# Patient Record
Sex: FEMALE | Race: OTHER | Employment: UNEMPLOYED | URBAN - METROPOLITAN AREA
[De-identification: names, ages, dates, MRNs, and addresses within clinical notes are randomized per-mention and may not be internally consistent; named-entity substitution may affect disease eponyms.]

---

## 2017-01-26 ENCOUNTER — HOSPITAL ENCOUNTER (EMERGENCY)
Age: 42
Discharge: HOME | End: 2017-01-27
Payer: COMMERCIAL

## 2017-01-26 DIAGNOSIS — R10.30 LOWER ABDOMINAL PAIN: Primary | ICD-10-CM

## 2017-01-26 DIAGNOSIS — N30.00 ACUTE CYSTITIS WITHOUT HEMATURIA: ICD-10-CM

## 2017-01-26 LAB
CHP ED QC CHECK: NORMAL
PREGNANCY TEST URINE, POC: NEGATIVE

## 2017-01-26 PROCEDURE — 81001 URINALYSIS AUTO W/SCOPE: CPT

## 2017-01-26 PROCEDURE — 99284 EMERGENCY DEPT VISIT MOD MDM: CPT

## 2017-01-26 ASSESSMENT — PAIN DESCRIPTION - ORIENTATION: ORIENTATION: LOWER

## 2017-01-26 ASSESSMENT — PAIN DESCRIPTION - DESCRIPTORS: DESCRIPTORS: CRAMPING;SHARP

## 2017-01-26 ASSESSMENT — PAIN DESCRIPTION - DIRECTION: RADIATING_TOWARDS: BILATERAL FLANKS

## 2017-01-26 ASSESSMENT — PAIN DESCRIPTION - LOCATION: LOCATION: ABDOMEN

## 2017-01-26 ASSESSMENT — PAIN SCALES - GENERAL: PAINLEVEL_OUTOF10: 8

## 2017-01-27 VITALS
DIASTOLIC BLOOD PRESSURE: 67 MMHG | OXYGEN SATURATION: 99 % | SYSTOLIC BLOOD PRESSURE: 105 MMHG | HEIGHT: 64 IN | TEMPERATURE: 98.3 F | HEART RATE: 98 BPM | WEIGHT: 137 LBS | RESPIRATION RATE: 16 BRPM | BODY MASS INDEX: 23.39 KG/M2

## 2017-01-27 LAB
ALBUMIN SERPL-MCNC: 4.4 G/DL (ref 3.9–4.9)
ALP BLD-CCNC: 52 U/L (ref 40–130)
ALT SERPL-CCNC: 38 U/L (ref 0–33)
ANION GAP SERPL CALCULATED.3IONS-SCNC: 10 MEQ/L (ref 7–13)
AST SERPL-CCNC: 28 U/L (ref 0–35)
BACTERIA: ABNORMAL /HPF
BASOPHILS ABSOLUTE: 0.1 K/UL (ref 0–0.2)
BASOPHILS RELATIVE PERCENT: 1.3 %
BILIRUB SERPL-MCNC: 0.4 MG/DL (ref 0–1.2)
BILIRUBIN URINE: NEGATIVE
BLOOD, URINE: NEGATIVE
BUN BLDV-MCNC: 10 MG/DL (ref 6–20)
CALCIUM SERPL-MCNC: 9.6 MG/DL (ref 8.6–10.2)
CHLORIDE BLD-SCNC: 100 MEQ/L (ref 98–107)
CLARITY: ABNORMAL
CO2: 27 MEQ/L (ref 22–29)
COLOR: YELLOW
CREAT SERPL-MCNC: 0.54 MG/DL (ref 0.5–0.9)
EOSINOPHILS ABSOLUTE: 0.2 K/UL (ref 0–0.7)
EOSINOPHILS RELATIVE PERCENT: 4.3 %
EPITHELIAL CELLS, UA: ABNORMAL /HPF
GFR AFRICAN AMERICAN: >60
GFR NON-AFRICAN AMERICAN: >60
GLOBULIN: 3.4 G/DL (ref 2.3–3.5)
GLUCOSE BLD-MCNC: 99 MG/DL (ref 74–109)
GLUCOSE URINE: NEGATIVE MG/DL
HCT VFR BLD CALC: 39.5 % (ref 37–47)
HEMOGLOBIN: 13.3 G/DL (ref 12–16)
KETONES, URINE: NEGATIVE MG/DL
LEUKOCYTE ESTERASE, URINE: ABNORMAL
LIPASE: 128 U/L (ref 13–60)
LYMPHOCYTES ABSOLUTE: 2 K/UL (ref 1–4.8)
LYMPHOCYTES RELATIVE PERCENT: 35.5 %
MCH RBC QN AUTO: 28.2 PG (ref 27–31.3)
MCHC RBC AUTO-ENTMCNC: 33.7 % (ref 33–37)
MCV RBC AUTO: 83.7 FL (ref 82–100)
MONOCYTES ABSOLUTE: 0.5 K/UL (ref 0.2–0.8)
MONOCYTES RELATIVE PERCENT: 9.4 %
NEUTROPHILS ABSOLUTE: 2.8 K/UL (ref 1.4–6.5)
NEUTROPHILS RELATIVE PERCENT: 49.5 %
NITRITE, URINE: NEGATIVE
PDW BLD-RTO: 12.9 % (ref 11.5–14.5)
PH UA: 6.5 (ref 5–9)
PLATELET # BLD: 169 K/UL (ref 130–400)
POTASSIUM SERPL-SCNC: 3.7 MEQ/L (ref 3.5–5.1)
PROTEIN UA: NEGATIVE MG/DL
RBC # BLD: 4.72 M/UL (ref 4.2–5.4)
RBC UA: ABNORMAL /HPF (ref 0–2)
SODIUM BLD-SCNC: 137 MEQ/L (ref 132–144)
SPECIFIC GRAVITY UA: 1.02 (ref 1–1.03)
TOTAL PROTEIN: 7.8 G/DL (ref 6.4–8.1)
URINE REFLEX TO CULTURE: YES
UROBILINOGEN, URINE: 0.2 E.U./DL
WBC # BLD: 5.6 K/UL (ref 4.8–10.8)
WBC UA: ABNORMAL /HPF (ref 0–5)

## 2017-01-27 PROCEDURE — 80053 COMPREHEN METABOLIC PANEL: CPT

## 2017-01-27 PROCEDURE — 83690 ASSAY OF LIPASE: CPT

## 2017-01-27 PROCEDURE — 6370000000 HC RX 637 (ALT 250 FOR IP): Performed by: PHYSICIAN ASSISTANT

## 2017-01-27 PROCEDURE — 85025 COMPLETE CBC W/AUTO DIFF WBC: CPT

## 2017-01-27 PROCEDURE — 36415 COLL VENOUS BLD VENIPUNCTURE: CPT

## 2017-01-27 PROCEDURE — 87086 URINE CULTURE/COLONY COUNT: CPT

## 2017-01-27 RX ORDER — NAPROXEN 500 MG/1
500 TABLET ORAL 2 TIMES DAILY
Qty: 20 TABLET | Refills: 0 | Status: ON HOLD | OUTPATIENT
Start: 2017-01-27 | End: 2019-02-10

## 2017-01-27 RX ORDER — CIPROFLOXACIN 500 MG/1
500 TABLET, FILM COATED ORAL ONCE
Status: COMPLETED | OUTPATIENT
Start: 2017-01-27 | End: 2017-01-27

## 2017-01-27 RX ORDER — HYDROCODONE BITARTRATE AND ACETAMINOPHEN 5; 325 MG/1; MG/1
1 TABLET ORAL ONCE
Status: COMPLETED | OUTPATIENT
Start: 2017-01-27 | End: 2017-01-27

## 2017-01-27 RX ORDER — CIPROFLOXACIN 500 MG/1
500 TABLET, FILM COATED ORAL 2 TIMES DAILY
Qty: 14 TABLET | Refills: 0 | Status: SHIPPED | OUTPATIENT
Start: 2017-01-27 | End: 2017-02-03

## 2017-01-27 RX ADMIN — HYDROCODONE BITARTRATE AND ACETAMINOPHEN 1 TABLET: 5; 325 TABLET ORAL at 00:14

## 2017-01-27 RX ADMIN — CIPROFLOXACIN HYDROCHLORIDE 500 MG: 500 TABLET, FILM COATED ORAL at 01:02

## 2017-01-27 ASSESSMENT — PAIN DESCRIPTION - LOCATION: LOCATION: ABDOMEN

## 2017-01-27 ASSESSMENT — PAIN SCALES - GENERAL
PAINLEVEL_OUTOF10: 8
PAINLEVEL_OUTOF10: 5

## 2017-01-27 ASSESSMENT — PAIN DESCRIPTION - PAIN TYPE: TYPE: ACUTE PAIN

## 2017-01-28 LAB — URINE CULTURE, ROUTINE: NORMAL

## 2017-09-04 ENCOUNTER — APPOINTMENT (OUTPATIENT)
Dept: ULTRASOUND IMAGING | Age: 42
End: 2017-09-04
Payer: COMMERCIAL

## 2017-09-04 ENCOUNTER — HOSPITAL ENCOUNTER (EMERGENCY)
Age: 42
Discharge: HOME | End: 2017-09-04
Attending: EMERGENCY MEDICINE
Payer: COMMERCIAL

## 2017-09-04 VITALS
BODY MASS INDEX: 23.52 KG/M2 | WEIGHT: 137 LBS | HEART RATE: 114 BPM | SYSTOLIC BLOOD PRESSURE: 120 MMHG | TEMPERATURE: 98.7 F | DIASTOLIC BLOOD PRESSURE: 80 MMHG | OXYGEN SATURATION: 100 % | RESPIRATION RATE: 16 BRPM

## 2017-09-04 DIAGNOSIS — N76.0 BACTERIAL VAGINOSIS: Primary | ICD-10-CM

## 2017-09-04 DIAGNOSIS — B96.89 BACTERIAL VAGINOSIS: Primary | ICD-10-CM

## 2017-09-04 LAB
BACTERIA: NORMAL /HPF
BILIRUBIN URINE: NEGATIVE
BLOOD, URINE: NEGATIVE
CHP ED QC CHECK: NORMAL
CLARITY: ABNORMAL
CLUE CELLS: ABNORMAL
COLOR: YELLOW
EPITHELIAL CELLS, UA: NORMAL /HPF
GLUCOSE URINE: NEGATIVE MG/DL
KETONES, URINE: NEGATIVE MG/DL
LEUKOCYTE ESTERASE, URINE: ABNORMAL
MUCUS: PRESENT
NITRITE, URINE: NEGATIVE
PH UA: 5.5 (ref 5–9)
PREGNANCY TEST URINE, POC: NEGATIVE
PROTEIN UA: NEGATIVE MG/DL
RBC UA: NORMAL /HPF (ref 0–2)
SPECIFIC GRAVITY UA: 1.02 (ref 1–1.03)
TRICHOMONAS PREP: ABNORMAL
TRICHOMONAS VAGINALIS SCREEN: NEGATIVE
UROBILINOGEN, URINE: 0.2 E.U./DL
WBC UA: NORMAL /HPF (ref 0–5)
YEAST WET PREP: ABNORMAL

## 2017-09-04 PROCEDURE — 87210 SMEAR WET MOUNT SALINE/INK: CPT

## 2017-09-04 PROCEDURE — 87591 N.GONORRHOEAE DNA AMP PROB: CPT

## 2017-09-04 PROCEDURE — 87660 TRICHOMONAS VAGIN DIR PROBE: CPT

## 2017-09-04 PROCEDURE — 81001 URINALYSIS AUTO W/SCOPE: CPT

## 2017-09-04 PROCEDURE — 87491 CHLMYD TRACH DNA AMP PROBE: CPT

## 2017-09-04 PROCEDURE — 99284 EMERGENCY DEPT VISIT MOD MDM: CPT

## 2017-09-04 PROCEDURE — 76830 TRANSVAGINAL US NON-OB: CPT

## 2017-09-04 RX ORDER — CLINDAMYCIN HYDROCHLORIDE 150 MG/1
300 CAPSULE ORAL 3 TIMES DAILY
Qty: 30 CAPSULE | Refills: 0 | Status: SHIPPED | OUTPATIENT
Start: 2017-09-04 | End: 2017-09-14

## 2017-09-04 ASSESSMENT — PAIN DESCRIPTION - ORIENTATION: ORIENTATION: MID;LOWER

## 2017-09-04 ASSESSMENT — PAIN SCALES - GENERAL: PAINLEVEL_OUTOF10: 8

## 2017-09-04 ASSESSMENT — ENCOUNTER SYMPTOMS
DIARRHEA: 0
COUGH: 0
NAUSEA: 0
SHORTNESS OF BREATH: 0
SORE THROAT: 0
VOMITING: 0
ABDOMINAL PAIN: 0

## 2017-09-04 ASSESSMENT — PAIN DESCRIPTION - LOCATION: LOCATION: ABDOMEN;PELVIS

## 2017-09-04 ASSESSMENT — PAIN DESCRIPTION - FREQUENCY: FREQUENCY: INTERMITTENT

## 2017-09-04 ASSESSMENT — PAIN DESCRIPTION - PAIN TYPE: TYPE: ACUTE PAIN

## 2017-09-04 ASSESSMENT — PAIN DESCRIPTION - PROGRESSION: CLINICAL_PROGRESSION: GRADUALLY WORSENING

## 2017-09-04 ASSESSMENT — PAIN DESCRIPTION - DESCRIPTORS: DESCRIPTORS: STABBING;CRAMPING

## 2017-09-04 ASSESSMENT — PAIN DESCRIPTION - ONSET: ONSET: ON-GOING

## 2017-09-08 LAB
C TRACH DNA GENITAL QL NAA+PROBE: NEGATIVE
N. GONORRHOEAE DNA: NEGATIVE

## 2017-12-05 ENCOUNTER — OFFICE VISIT (OUTPATIENT)
Dept: PEDIATRICS | Age: 42
End: 2017-12-05

## 2017-12-05 VITALS
SYSTOLIC BLOOD PRESSURE: 92 MMHG | TEMPERATURE: 98.5 F | HEART RATE: 91 BPM | BODY MASS INDEX: 22.49 KG/M2 | WEIGHT: 131 LBS | OXYGEN SATURATION: 99 % | DIASTOLIC BLOOD PRESSURE: 62 MMHG | RESPIRATION RATE: 14 BRPM

## 2017-12-05 DIAGNOSIS — N94.6 DYSMENORRHEA: ICD-10-CM

## 2017-12-05 DIAGNOSIS — N30.00 ACUTE CYSTITIS WITHOUT HEMATURIA: Primary | ICD-10-CM

## 2017-12-05 LAB
BILIRUBIN, POC: ABNORMAL
BLOOD URINE, POC: NEGATIVE
CLARITY, POC: CLEAR
COLOR, POC: YELLOW
CONTROL: NORMAL
GLUCOSE URINE, POC: NEGATIVE
KETONES, POC: NEGATIVE
LEUKOCYTE EST, POC: ABNORMAL
NITRITE, POC: NEGATIVE
PH, POC: 5
PREGNANCY TEST URINE, POC: NEGATIVE
PROTEIN, POC: ABNORMAL
SPECIFIC GRAVITY, POC: 1.01
UROBILINOGEN, POC: NEGATIVE

## 2017-12-05 PROCEDURE — 81002 URINALYSIS NONAUTO W/O SCOPE: CPT | Performed by: NURSE PRACTITIONER

## 2017-12-05 PROCEDURE — 81025 URINE PREGNANCY TEST: CPT | Performed by: NURSE PRACTITIONER

## 2017-12-05 PROCEDURE — 99202 OFFICE O/P NEW SF 15 MIN: CPT | Performed by: NURSE PRACTITIONER

## 2017-12-05 RX ORDER — METFORMIN HYDROCHLORIDE 500 MG/1
TABLET, EXTENDED RELEASE ORAL
Refills: 3 | Status: ON HOLD | COMMUNITY
Start: 2017-09-01 | End: 2019-02-10

## 2017-12-05 RX ORDER — NITROFURANTOIN 25; 75 MG/1; MG/1
100 CAPSULE ORAL 2 TIMES DAILY
Qty: 14 CAPSULE | Refills: 0 | Status: SHIPPED | OUTPATIENT
Start: 2017-12-05 | End: 2017-12-12

## 2017-12-05 ASSESSMENT — ENCOUNTER SYMPTOMS
WHEEZING: 0
SHORTNESS OF BREATH: 0
ABDOMINAL PAIN: 0
SORE THROAT: 0
SINUS PAIN: 0
COUGH: 0
NAUSEA: 0
VOMITING: 0

## 2017-12-05 NOTE — PATIENT INSTRUCTIONS
Patient Education        Troy Kathleen en las mujeres: Instrucciones de cuidado - [ Urinary Tract Infection in Women: Care Instructions ]  Instrucciones de cuidado    Doreen infección urinaria (UTI, por helga siglas en inglés) es un término general que hace referencia a doreen infección que se produce en cualquier parte entre los riñones y la uretra (conducto por el cual se expulsa la orina). La mayoría de las UTI son infecciones de la vejiga. Con frecuencia, causan dolor o ardor al [place]. Las UTI son causadas por bacterias y pueden curarse con antibióticos. Asegúrese de completar el tratamiento para que la infección desaparezca. La atención de seguimiento es doreen parte clave de lainez tratamiento y seguridad. Asegúrese de hacer y acudir a todas las citas, y llame a lainez médico si está teniendo problemas. También es doreen buena idea saber los resultados de helga exámenes y mantener doreen lista de los medicamentos que aayush. ¿Cómo puede cuidarse en el hogar? · [address] No deje de tomarlos por el hecho de sentirse mejor. Debe ok todos los antibióticos hasta terminarlos. · Sarah los próximos nimo o [address], yasmeen mayor cantidad de [place] y otros líquidos. [place] puede ayudar a eliminar las bacterias que provocan la infección. (Si tiene doreen enfermedad de los riñones, el corazón o el hígado y tiene que Betty's líquidos, hable con lainez médico antes de aumentar lainez consumo). · Evite las bebidas gaseosas o con cafeína. Pueden irritar la vejiga. · Orine con frecuencia. Trate de vaciar la vejiga cada vez que orine. · Para aliviar el dolor, tome un baño caliente o colóquese doreen almohadilla térmica a baja temperatura sobre la parte baja del abdomen o la luis alberto genital. Nunca se duerma mientras [place] doreen almohadilla térmica. Para prevenir las infecciones urinarias  · Yasmeen abundante agua todos los días. [place] la ayuda a orinar con frecuencia, lo que elimina las bacterias de lainez organismo.  (Si tiene [employer]

## 2017-12-05 NOTE — PROGRESS NOTES
 oxyCODONE-acetaminophen (PERCOCET) 5-325 MG per tablet 1 or 2 every 4-6 hours prn pain 30 tablet 0    ibuprofen (ADVIL;MOTRIN) 600 MG tablet Take 1 tablet by mouth every 6 hours as needed for Pain 30 tablet 3     No current facility-administered medications on file prior to visit. Allergies   Allergen Reactions    Metronidazole Swelling    Sulfa Antibiotics Swelling     Patient Active Problem List   Diagnosis    Obstructed fallopian tubes     Review of Systems   Constitutional: Negative for chills. HENT: Negative for congestion, ear pain, postnasal drip, sinus pain and sore throat. Respiratory: Negative for cough, shortness of breath and wheezing. Cardiovascular: Negative for chest pain. Gastrointestinal: Negative for abdominal pain, nausea and vomiting. Genitourinary: Positive for dysuria. Negative for flank pain, frequency, hematuria, hesitancy and urgency. Objective:     Vitals:    12/05/17 0856   BP: 92/62   Site: Left Arm   Position: Sitting   Cuff Size: Medium Adult   Pulse: 91   Resp: 14   Temp: 98.5 °F (36.9 °C)   TempSrc: Oral   SpO2: 99%   Weight: 131 lb (59.4 kg)     Physical Exam   Constitutional: She appears well-developed and well-nourished. HENT:   Head: Normocephalic. Cardiovascular: Normal rate, regular rhythm and normal heart sounds. Pulmonary/Chest: Effort normal and breath sounds normal.   Abdominal: Normal appearance. There is no hepatosplenomegaly. There is tenderness in the suprapubic area. There is no rigidity, no rebound, no guarding, no CVA tenderness, no tenderness at McBurney's point and negative Tesfaye's sign. Home School: Children attend school in [facility]    Who is present for visit: Daughter    Disposition: Discharged home    Assessment & Plan:     Linette June was seen today for dysuria.     Diagnoses and all orders for this visit:    Acute cystitis without hematuria  -     POCT Urinalysis no Micro  -     nitrofurantoin, macrocrystal-monohydrate,

## 2018-01-02 ENCOUNTER — HOSPITAL ENCOUNTER (EMERGENCY)
Age: 43
Discharge: HOME | End: 2018-01-02
Payer: COMMERCIAL

## 2018-01-02 ENCOUNTER — APPOINTMENT (OUTPATIENT)
Dept: CT IMAGING | Age: 43
End: 2018-01-02
Payer: COMMERCIAL

## 2018-01-02 ENCOUNTER — APPOINTMENT (OUTPATIENT)
Dept: GENERAL RADIOLOGY | Age: 43
End: 2018-01-02
Payer: COMMERCIAL

## 2018-01-02 VITALS
BODY MASS INDEX: 22.2 KG/M2 | OXYGEN SATURATION: 100 % | DIASTOLIC BLOOD PRESSURE: 67 MMHG | TEMPERATURE: 99.4 F | HEART RATE: 109 BPM | RESPIRATION RATE: 20 BRPM | SYSTOLIC BLOOD PRESSURE: 104 MMHG | WEIGHT: 130 LBS | HEIGHT: 64 IN

## 2018-01-02 DIAGNOSIS — M79.10 MYALGIA: ICD-10-CM

## 2018-01-02 DIAGNOSIS — N39.0 URINARY TRACT INFECTION WITHOUT HEMATURIA, SITE UNSPECIFIED: ICD-10-CM

## 2018-01-02 DIAGNOSIS — B34.9 NONSPECIFIC SYNDROME SUGGESTIVE OF VIRAL ILLNESS: ICD-10-CM

## 2018-01-02 DIAGNOSIS — R11.0 NAUSEA: Primary | ICD-10-CM

## 2018-01-02 LAB
ALBUMIN SERPL-MCNC: 4.3 G/DL (ref 3.9–4.9)
ALP BLD-CCNC: 81 U/L (ref 40–130)
ALT SERPL-CCNC: 155 U/L (ref 0–33)
ANION GAP SERPL CALCULATED.3IONS-SCNC: 10 MEQ/L (ref 7–13)
AST SERPL-CCNC: 119 U/L (ref 0–35)
BACTERIA: ABNORMAL /HPF
BILIRUB SERPL-MCNC: 0.8 MG/DL (ref 0–1.2)
BILIRUBIN URINE: NEGATIVE
BLOOD, URINE: ABNORMAL
BUN BLDV-MCNC: 7 MG/DL (ref 6–20)
CALCIUM SERPL-MCNC: 9.3 MG/DL (ref 8.6–10.2)
CHLORIDE BLD-SCNC: 100 MEQ/L (ref 98–107)
CLARITY: ABNORMAL
CO2: 29 MEQ/L (ref 22–29)
COLOR: YELLOW
CREAT SERPL-MCNC: 0.56 MG/DL (ref 0.5–0.9)
EPITHELIAL CELLS, UA: ABNORMAL /HPF
GFR AFRICAN AMERICAN: >60
GFR NON-AFRICAN AMERICAN: >60
GLOBULIN: 4.4 G/DL (ref 2.3–3.5)
GLUCOSE BLD-MCNC: 90 MG/DL (ref 74–109)
GLUCOSE URINE: NEGATIVE MG/DL
HCT VFR BLD CALC: 40.9 % (ref 37–47)
HEMOGLOBIN: 13.8 G/DL (ref 12–16)
KETONES, URINE: NEGATIVE MG/DL
LEUKOCYTE ESTERASE, URINE: ABNORMAL
LIPASE: 28 U/L (ref 13–60)
MCH RBC QN AUTO: 28.5 PG (ref 27–31.3)
MCHC RBC AUTO-ENTMCNC: 33.6 % (ref 33–37)
MCV RBC AUTO: 84.9 FL (ref 82–100)
NITRITE, URINE: NEGATIVE
PDW BLD-RTO: 12.5 % (ref 11.5–14.5)
PH UA: 8 (ref 5–9)
PLATELET # BLD: 175 K/UL (ref 130–400)
POTASSIUM SERPL-SCNC: 4.3 MEQ/L (ref 3.5–5.1)
PREGNANCY TEST URINE, POC: NEGATIVE
PROTEIN UA: ABNORMAL MG/DL
RAPID INFLUENZA  B AGN: NEGATIVE
RAPID INFLUENZA A AGN: NEGATIVE
RBC # BLD: 4.82 M/UL (ref 4.2–5.4)
RBC UA: ABNORMAL /HPF (ref 0–2)
SODIUM BLD-SCNC: 139 MEQ/L (ref 132–144)
SPECIFIC GRAVITY UA: 1.01 (ref 1–1.03)
TOTAL PROTEIN: 8.7 G/DL (ref 6.4–8.1)
URINE REFLEX TO CULTURE: YES
UROBILINOGEN, URINE: 1 E.U./DL
WBC # BLD: 7.2 K/UL (ref 4.8–10.8)
WBC UA: ABNORMAL /HPF (ref 0–5)

## 2018-01-02 PROCEDURE — 6360000002 HC RX W HCPCS: Performed by: PHYSICIAN ASSISTANT

## 2018-01-02 PROCEDURE — 96374 THER/PROPH/DIAG INJ IV PUSH: CPT

## 2018-01-02 PROCEDURE — 81001 URINALYSIS AUTO W/SCOPE: CPT

## 2018-01-02 PROCEDURE — 87077 CULTURE AEROBIC IDENTIFY: CPT

## 2018-01-02 PROCEDURE — 99284 EMERGENCY DEPT VISIT MOD MDM: CPT

## 2018-01-02 PROCEDURE — 87186 SC STD MICRODIL/AGAR DIL: CPT

## 2018-01-02 PROCEDURE — 80053 COMPREHEN METABOLIC PANEL: CPT

## 2018-01-02 PROCEDURE — 96375 TX/PRO/DX INJ NEW DRUG ADDON: CPT

## 2018-01-02 PROCEDURE — 87086 URINE CULTURE/COLONY COUNT: CPT

## 2018-01-02 PROCEDURE — 71046 X-RAY EXAM CHEST 2 VIEWS: CPT

## 2018-01-02 PROCEDURE — 85027 COMPLETE CBC AUTOMATED: CPT

## 2018-01-02 PROCEDURE — 83690 ASSAY OF LIPASE: CPT

## 2018-01-02 PROCEDURE — 36415 COLL VENOUS BLD VENIPUNCTURE: CPT

## 2018-01-02 PROCEDURE — 2580000003 HC RX 258: Performed by: PHYSICIAN ASSISTANT

## 2018-01-02 PROCEDURE — 74176 CT ABD & PELVIS W/O CONTRAST: CPT

## 2018-01-02 PROCEDURE — 86403 PARTICLE AGGLUT ANTBDY SCRN: CPT

## 2018-01-02 RX ORDER — KETOROLAC TROMETHAMINE 30 MG/ML
30 INJECTION, SOLUTION INTRAMUSCULAR; INTRAVENOUS ONCE
Status: COMPLETED | OUTPATIENT
Start: 2018-01-02 | End: 2018-01-02

## 2018-01-02 RX ORDER — ONDANSETRON 2 MG/ML
4 INJECTION INTRAMUSCULAR; INTRAVENOUS ONCE
Status: COMPLETED | OUTPATIENT
Start: 2018-01-02 | End: 2018-01-02

## 2018-01-02 RX ORDER — TRAMADOL HYDROCHLORIDE 50 MG/1
50 TABLET ORAL EVERY 6 HOURS PRN
Qty: 8 TABLET | Refills: 0 | Status: SHIPPED | OUTPATIENT
Start: 2018-01-02 | End: 2018-01-04

## 2018-01-02 RX ORDER — MORPHINE SULFATE 4 MG/ML
4 INJECTION, SOLUTION INTRAMUSCULAR; INTRAVENOUS ONCE
Status: COMPLETED | OUTPATIENT
Start: 2018-01-02 | End: 2018-01-02

## 2018-01-02 RX ORDER — ONDANSETRON 4 MG/1
4 TABLET, FILM COATED ORAL EVERY 8 HOURS PRN
Qty: 12 TABLET | Refills: 0 | Status: SHIPPED | OUTPATIENT
Start: 2018-01-02 | End: 2018-03-18

## 2018-01-02 RX ORDER — NITROFURANTOIN 25; 75 MG/1; MG/1
100 CAPSULE ORAL 2 TIMES DAILY
Qty: 10 CAPSULE | Refills: 0 | Status: SHIPPED | OUTPATIENT
Start: 2018-01-02 | End: 2018-01-07

## 2018-01-02 RX ORDER — 0.9 % SODIUM CHLORIDE 0.9 %
1000 INTRAVENOUS SOLUTION INTRAVENOUS ONCE
Status: COMPLETED | OUTPATIENT
Start: 2018-01-02 | End: 2018-01-02

## 2018-01-02 RX ADMIN — SODIUM CHLORIDE 1000 ML: 9 INJECTION, SOLUTION INTRAVENOUS at 10:01

## 2018-01-02 RX ADMIN — Medication 4 MG: at 10:58

## 2018-01-02 RX ADMIN — ONDANSETRON 4 MG: 2 INJECTION INTRAMUSCULAR; INTRAVENOUS at 10:01

## 2018-01-02 RX ADMIN — KETOROLAC TROMETHAMINE 30 MG: 30 INJECTION, SOLUTION INTRAMUSCULAR at 10:02

## 2018-01-02 ASSESSMENT — PAIN DESCRIPTION - FREQUENCY: FREQUENCY: CONTINUOUS

## 2018-01-02 ASSESSMENT — ENCOUNTER SYMPTOMS
COUGH: 1
EYE DISCHARGE: 0
WHEEZING: 0
SHORTNESS OF BREATH: 0
BACK PAIN: 0
ABDOMINAL PAIN: 0
NAUSEA: 1
VOMITING: 1
RHINORRHEA: 0
COLOR CHANGE: 0
SORE THROAT: 0
DIARRHEA: 0
CONSTIPATION: 0
STRIDOR: 0
ABDOMINAL DISTENTION: 0

## 2018-01-02 ASSESSMENT — PAIN DESCRIPTION - LOCATION: LOCATION: FLANK;GENERALIZED

## 2018-01-02 ASSESSMENT — PAIN SCALES - GENERAL
PAINLEVEL_OUTOF10: 8
PAINLEVEL_OUTOF10: 9
PAINLEVEL_OUTOF10: 8

## 2018-01-02 ASSESSMENT — PAIN DESCRIPTION - DESCRIPTORS: DESCRIPTORS: ACHING

## 2018-01-02 ASSESSMENT — PAIN DESCRIPTION - ORIENTATION: ORIENTATION: RIGHT

## 2018-01-02 NOTE — ED PROVIDER NOTES
Social History Narrative    None       SCREENINGS             PHYSICAL EXAM    (up to 7 for level 4, 8 or more for level 5)     ED Triage Vitals [01/02/18 0902]   BP Temp Temp Source Pulse Resp SpO2 Height Weight   119/74 98.2 °F (36.8 °C) Oral 137 18 98 % 5' 4\" (1.626 m) 130 lb (59 kg)       Physical Exam   Constitutional: She is oriented to person, place, and time. She appears well-developed and well-nourished. HENT:   Head: Normocephalic. Eyes: Pupils are equal, round, and reactive to light. Neck: Neck supple. No JVD present. No tracheal deviation present. Cardiovascular: Normal rate. Pulmonary/Chest: Effort normal and breath sounds normal. No respiratory distress. She has no wheezes. She has no rales. She exhibits no tenderness. Abdominal: Soft. Bowel sounds are normal. She exhibits no distension and no mass. There is no tenderness. There is no rebound and no guarding. Musculoskeletal: Normal range of motion. She exhibits no edema or deformity. Neurological: She is oriented to person, place, and time. Coordination normal.   Positive right CVA tenderness. Skin: Skin is warm and dry. Psychiatric: She has a normal mood and affect. DIAGNOSTIC RESULTS     EKG: All EKG's are interpreted by the Emergency Department Physician who either signs or Co-signs this chart in the absence of a cardiologist.        RADIOLOGY:   Non-plain film images such as CT, Ultrasound and MRI are read by the radiologist. Plain radiographic images are visualized and preliminarily interpreted by the emergency physician with the below findings:    CT abdomen and pelvis shows medullary calcification associate with horseshoe kidney there is no hydronephrosis    Interpretation per the Radiologist below, if available at the time of this note:    CT ABDOMEN PELVIS WO IV CONTRAST Additional Contrast? None   Final Result   MEDULLARY CALCIFICATION ASSOCIATED WITH HORSESHOE KIDNEY. THERE IS NO HYDRONEPHROSIS.  NATURE OF THE abdomen pelvis was also completed that she complained of some right flank pain there is no signs of obstructive stone at this time, and other acute intra-abdominal or pelvic process. She'll be discharged as urinary tract infection, placed on Macrobid and Zofran for nausea and Ultram for her pain control and advised to follow up with her regular family physician. CRITICAL CARE TIME   Total Critical Care time was 0 minutes, excluding separately reportable procedures. There was a high probability of clinically significant/life threatening deterioration in the patient's condition which required my urgent intervention. CONSULTS:  None    PROCEDURES:  Unless otherwise noted below, none     Procedures    FINAL IMPRESSION      1. Nausea    2. Urinary tract infection without hematuria, site unspecified    3. Nonspecific syndrome suggestive of viral illness    4. Myalgia          DISPOSITION/PLAN   DISPOSITION Decision To Discharge 01/02/2018 11:22:49 AM      PATIENT REFERRED TO:  [address] [place] [address]    In 2 days        DISCHARGE MEDICATIONS:  New Prescriptions    NITROFURANTOIN, MACROCRYSTAL-MONOHYDRATE, (MACROBID) 100 MG CAPSULE    Take 1 capsule by mouth 2 times daily for 5 days    ONDANSETRON (ZOFRAN) 4 MG TABLET    Take 1 tablet by mouth every 8 hours as needed for Nausea    TRAMADOL (ULTRAM) 50 MG TABLET    Take 1 tablet by mouth every 6 hours as needed for Pain for up to 2 days . Take lowest dose possible to manage pain. Attestation: The Prescription Monitoring Report for this patient was reviewed today.  (Gamaliel Fermin PA-C)    (Please note that portions of this note were completed with a voice recognition program.  Efforts were made to edit the dictations but occasionally words are mis-transcribed.)    Gamaliel Fermin PA-C (electronically signed)  Attending Emergency Physician         Gamaliel Fermin PA-C  01/02/18 Luna Martinez PA-C  01/02/18 8723

## 2018-01-02 NOTE — ED TRIAGE NOTES
Pt ill for 6 days with cough, headache, right ear pain. Cough is productive for clear secretions. Pt awake alert oriented x 3 color pink skin warm and dry. Lungs clear. Pt has frequent dry sounding cough.

## 2018-01-04 LAB
ORGANISM: ABNORMAL
URINE CULTURE, ROUTINE: ABNORMAL
URINE CULTURE, ROUTINE: ABNORMAL

## 2018-03-18 ENCOUNTER — APPOINTMENT (OUTPATIENT)
Dept: ULTRASOUND IMAGING | Age: 43
End: 2018-03-18
Payer: COMMERCIAL

## 2018-03-18 ENCOUNTER — HOSPITAL ENCOUNTER (EMERGENCY)
Age: 43
Discharge: HOME | End: 2018-03-18
Payer: COMMERCIAL

## 2018-03-18 VITALS
HEIGHT: 64 IN | BODY MASS INDEX: 22.2 KG/M2 | SYSTOLIC BLOOD PRESSURE: 98 MMHG | DIASTOLIC BLOOD PRESSURE: 56 MMHG | OXYGEN SATURATION: 100 % | WEIGHT: 130 LBS | RESPIRATION RATE: 18 BRPM | HEART RATE: 98 BPM | TEMPERATURE: 98.2 F

## 2018-03-18 DIAGNOSIS — N71.9 ENDOMETRITIS: ICD-10-CM

## 2018-03-18 DIAGNOSIS — D64.9 ANEMIA, UNSPECIFIED TYPE: ICD-10-CM

## 2018-03-18 DIAGNOSIS — N93.8 DYSFUNCTIONAL UTERINE BLEEDING: Primary | ICD-10-CM

## 2018-03-18 LAB
ABO/RH: NORMAL
ALBUMIN SERPL-MCNC: 4.1 G/DL (ref 3.9–4.9)
ALP BLD-CCNC: 55 U/L (ref 40–130)
ALT SERPL-CCNC: 40 U/L (ref 0–33)
ANION GAP SERPL CALCULATED.3IONS-SCNC: 8 MEQ/L (ref 7–13)
ANTIBODY SCREEN: NORMAL
AST SERPL-CCNC: 41 U/L (ref 0–35)
BACTERIA: ABNORMAL /HPF
BASOPHILS ABSOLUTE: 0 K/UL (ref 0–0.2)
BASOPHILS RELATIVE PERCENT: 0.7 %
BILIRUB SERPL-MCNC: 0.3 MG/DL (ref 0–1.2)
BILIRUBIN URINE: NEGATIVE
BLOOD, URINE: ABNORMAL
BUN BLDV-MCNC: 6 MG/DL (ref 6–20)
CALCIUM SERPL-MCNC: 9 MG/DL (ref 8.6–10.2)
CHLORIDE BLD-SCNC: 102 MEQ/L (ref 98–107)
CLARITY: ABNORMAL
CO2: 27 MEQ/L (ref 22–29)
COLOR: ABNORMAL
CREAT SERPL-MCNC: 0.45 MG/DL (ref 0.5–0.9)
EOSINOPHILS ABSOLUTE: 0.3 K/UL (ref 0–0.7)
EOSINOPHILS RELATIVE PERCENT: 7.8 %
EPITHELIAL CELLS, UA: ABNORMAL /HPF
GFR AFRICAN AMERICAN: >60
GFR NON-AFRICAN AMERICAN: >60
GLOBULIN: 3.5 G/DL (ref 2.3–3.5)
GLUCOSE BLD-MCNC: 97 MG/DL (ref 74–109)
GLUCOSE URINE: NEGATIVE MG/DL
HCT VFR BLD CALC: 23.6 % (ref 37–47)
HEMOGLOBIN: 8 G/DL (ref 12–16)
KETONES, URINE: ABNORMAL MG/DL
LEUKOCYTE ESTERASE, URINE: ABNORMAL
LYMPHOCYTES ABSOLUTE: 1.6 K/UL (ref 1–4.8)
LYMPHOCYTES RELATIVE PERCENT: 36.9 %
MCH RBC QN AUTO: 28.7 PG (ref 27–31.3)
MCHC RBC AUTO-ENTMCNC: 34.1 % (ref 33–37)
MCV RBC AUTO: 84 FL (ref 82–100)
MONOCYTES ABSOLUTE: 0.4 K/UL (ref 0.2–0.8)
MONOCYTES RELATIVE PERCENT: 8.6 %
NEUTROPHILS ABSOLUTE: 1.9 K/UL (ref 1.4–6.5)
NEUTROPHILS RELATIVE PERCENT: 46 %
NITRITE, URINE: NEGATIVE
PDW BLD-RTO: 13.7 % (ref 11.5–14.5)
PH UA: 6.5 (ref 5–9)
PLATELET # BLD: 167 K/UL (ref 130–400)
POTASSIUM SERPL-SCNC: 4 MEQ/L (ref 3.5–5.1)
PROTEIN UA: 100 MG/DL
RBC # BLD: 2.81 M/UL (ref 4.2–5.4)
RBC UA: >100 /HPF (ref 0–2)
RENAL EPITHELIAL, UA: ABNORMAL /HPF
SODIUM BLD-SCNC: 137 MEQ/L (ref 132–144)
SPECIFIC GRAVITY UA: 1.01 (ref 1–1.03)
TOTAL PROTEIN: 7.6 G/DL (ref 6.4–8.1)
URINE REFLEX TO CULTURE: YES
UROBILINOGEN, URINE: 1 E.U./DL
WBC # BLD: 4.2 K/UL (ref 4.8–10.8)
WBC UA: ABNORMAL /HPF (ref 0–5)

## 2018-03-18 PROCEDURE — 96374 THER/PROPH/DIAG INJ IV PUSH: CPT

## 2018-03-18 PROCEDURE — 76830 TRANSVAGINAL US NON-OB: CPT

## 2018-03-18 PROCEDURE — 85025 COMPLETE CBC W/AUTO DIFF WBC: CPT

## 2018-03-18 PROCEDURE — 76856 US EXAM PELVIC COMPLETE: CPT

## 2018-03-18 PROCEDURE — 86850 RBC ANTIBODY SCREEN: CPT

## 2018-03-18 PROCEDURE — 6360000002 HC RX W HCPCS: Performed by: PHYSICIAN ASSISTANT

## 2018-03-18 PROCEDURE — 2580000003 HC RX 258: Performed by: PHYSICIAN ASSISTANT

## 2018-03-18 PROCEDURE — 86901 BLOOD TYPING SEROLOGIC RH(D): CPT

## 2018-03-18 PROCEDURE — 86900 BLOOD TYPING SEROLOGIC ABO: CPT

## 2018-03-18 PROCEDURE — 6370000000 HC RX 637 (ALT 250 FOR IP): Performed by: PHYSICIAN ASSISTANT

## 2018-03-18 PROCEDURE — 99284 EMERGENCY DEPT VISIT MOD MDM: CPT

## 2018-03-18 PROCEDURE — 80053 COMPREHEN METABOLIC PANEL: CPT

## 2018-03-18 PROCEDURE — 87086 URINE CULTURE/COLONY COUNT: CPT

## 2018-03-18 PROCEDURE — 81001 URINALYSIS AUTO W/SCOPE: CPT

## 2018-03-18 PROCEDURE — 36415 COLL VENOUS BLD VENIPUNCTURE: CPT

## 2018-03-18 RX ORDER — KETOROLAC TROMETHAMINE 30 MG/ML
30 INJECTION, SOLUTION INTRAMUSCULAR; INTRAVENOUS ONCE
Status: COMPLETED | OUTPATIENT
Start: 2018-03-18 | End: 2018-03-18

## 2018-03-18 RX ORDER — CEPHALEXIN 500 MG/1
500 CAPSULE ORAL 4 TIMES DAILY
Qty: 40 CAPSULE | Refills: 0 | Status: SHIPPED | OUTPATIENT
Start: 2018-03-18 | End: 2018-04-19 | Stop reason: ALTCHOICE

## 2018-03-18 RX ORDER — 0.9 % SODIUM CHLORIDE 0.9 %
1000 INTRAVENOUS SOLUTION INTRAVENOUS ONCE
Status: COMPLETED | OUTPATIENT
Start: 2018-03-18 | End: 2018-03-18

## 2018-03-18 RX ORDER — MEDROXYPROGESTERONE ACETATE 2.5 MG/1
10 TABLET ORAL DAILY
Qty: 20 TABLET | Refills: 0 | Status: ON HOLD | OUTPATIENT
Start: 2018-03-18 | End: 2019-02-10

## 2018-03-18 RX ORDER — CEPHALEXIN 500 MG/1
500 CAPSULE ORAL ONCE
Status: COMPLETED | OUTPATIENT
Start: 2018-03-18 | End: 2018-03-18

## 2018-03-18 RX ORDER — MEDROXYPROGESTERONE ACETATE 10 MG/1
10 TABLET ORAL ONCE
Status: COMPLETED | OUTPATIENT
Start: 2018-03-18 | End: 2018-03-18

## 2018-03-18 RX ORDER — SODIUM CHLORIDE 9 MG/ML
INJECTION, SOLUTION INTRAVENOUS
Status: DISCONTINUED
Start: 2018-03-18 | End: 2018-03-19 | Stop reason: HOSPADM

## 2018-03-18 RX ADMIN — SODIUM CHLORIDE 1000 ML: 9 INJECTION, SOLUTION INTRAVENOUS at 17:42

## 2018-03-18 RX ADMIN — KETOROLAC TROMETHAMINE 30 MG: 30 INJECTION, SOLUTION INTRAMUSCULAR at 18:32

## 2018-03-18 RX ADMIN — MEDROXYPROGESTERONE ACETATE 10 MG: 10 TABLET ORAL at 22:50

## 2018-03-18 RX ADMIN — CEPHALEXIN 500 MG: 500 CAPSULE ORAL at 21:40

## 2018-03-18 ASSESSMENT — PAIN DESCRIPTION - FREQUENCY: FREQUENCY: INTERMITTENT

## 2018-03-18 ASSESSMENT — ENCOUNTER SYMPTOMS
TROUBLE SWALLOWING: 0
COLOR CHANGE: 0
SHORTNESS OF BREATH: 0
EYE PAIN: 0
ABDOMINAL PAIN: 0
APNEA: 0
ALLERGIC/IMMUNOLOGIC NEGATIVE: 1

## 2018-03-18 ASSESSMENT — PAIN DESCRIPTION - ORIENTATION: ORIENTATION: RIGHT;LEFT;LOWER

## 2018-03-18 ASSESSMENT — PAIN DESCRIPTION - PAIN TYPE: TYPE: ACUTE PAIN

## 2018-03-18 ASSESSMENT — PAIN SCALES - GENERAL
PAINLEVEL_OUTOF10: 6
PAINLEVEL_OUTOF10: 2

## 2018-03-18 ASSESSMENT — PAIN DESCRIPTION - DESCRIPTORS: DESCRIPTORS: CRAMPING

## 2018-03-18 ASSESSMENT — PAIN DESCRIPTION - PROGRESSION: CLINICAL_PROGRESSION: GRADUALLY IMPROVING

## 2018-03-18 ASSESSMENT — PAIN DESCRIPTION - ONSET: ONSET: ON-GOING

## 2018-03-18 ASSESSMENT — PAIN DESCRIPTION - LOCATION: LOCATION: ABDOMEN

## 2018-03-18 NOTE — ED PROVIDER NOTES
place, and time. She appears well-developed and well-nourished. No distress. HENT:   Head: Normocephalic and atraumatic. Mouth/Throat: No oropharyngeal exudate. Eyes: Conjunctivae and EOM are normal. Pupils are equal, round, and reactive to light. No scleral icterus. Neck: Normal range of motion. Neck supple. No tracheal deviation present. Cardiovascular: Normal rate, normal heart sounds and intact distal pulses. Pulmonary/Chest: Effort normal and breath sounds normal. No respiratory distress. Abdominal: Soft. Bowel sounds are normal. She exhibits no distension. Musculoskeletal: Normal range of motion. Neurological: She is alert and oriented to person, place, and time. No cranial nerve deficit. She exhibits normal muscle tone. Skin: Skin is warm and dry. No rash noted. She is not diaphoretic. No erythema. Psychiatric: She has a normal mood and affect. Her behavior is normal. Judgment and thought content normal.   Nursing note and vitals reviewed. DIAGNOSTIC RESULTS     RADIOLOGY:   Non-plain film images such as CT, Ultrasound and MRI are read by the radiologist. Plain radiographic images are visualized and preliminarily interpreted by Ulysses Dust, PA-C with the below findings:     Thickened endometrium, cannot r/o carcinoma    Interpretation per the Radiologist below, if available at the time of this note:    US PELVIS COMPLETE    (Results Pending)   US NON OB TRANSVAGINAL    (Results Pending)       LABS:  Labs Reviewed   COMPREHENSIVE METABOLIC PANEL - Abnormal; Notable for the following:        Result Value    CREATININE 0.45 (*)     ALT 40 (*)     AST 41 (*)     All other components within normal limits   CBC WITH AUTO DIFFERENTIAL - Abnormal; Notable for the following:     WBC 4.2 (*)     RBC 2.81 (*)     Hemoglobin 8.0 (*)     Hematocrit 23.6 (*)     All other components within normal limits   URINE RT REFLEX TO CULTURE - Abnormal; Notable for the following:     Color, UA RED (*) Clarity, UA CLOUDY (*)     Ketones, Urine TRACE (*)     Blood, Urine LARGE (*)     Protein,  (*)     Leukocyte Esterase, Urine SMALL (*)     All other components within normal limits   MICROSCOPIC URINALYSIS - Abnormal; Notable for the following:     WBC, UA 20-50 (*)     RBC, UA >100 (*)     All other components within normal limits   URINE CULTURE   TYPE AND SCREEN       All other labs were within normal range or not returned as of this dictation. EMERGENCY DEPARTMENT COURSE and DIFFERENTIAL DIAGNOSIS/MDM:   Vitals:    Vitals:    03/18/18 1651 03/18/18 1834   BP: 121/78 (!) 104/59   Pulse: 124 108   Resp: 18 18   Temp: 98.2 °F (36.8 °C)    TempSrc: Oral    SpO2: 100% 99%   Weight: 130 lb (59 kg)    Height: 5' 4\" (1.626 m)        REASSESSMENT     ED Course        Patient had improvement of symptoms while in the emergency department. Patient has anemia but comments are both 7.5 and there is no immediate need for transfusion. I discussed the ultrasound findings with the patient and her significant other who does translate for the patient and explained that she needs to see a gynecologist as soon as possible, and I advised them to call in the morning to make an appointment for the next 1-2 days. I will give the patient progesterone to slow her bleeding advised her that if any dizziness was to get worse she should return to emergency department for repeat counts. MDM    PROCEDURES:    Procedures      FINAL IMPRESSION      1. Dysfunctional uterine bleeding    2. Anemia, unspecified type    3.  Endometritis          DISPOSITION/PLAN   DISPOSITION Decision To Discharge 03/18/2018 09:25:31 PM      PATIENT REFERRED TO:  Giovana Box, [address] [facility] [address] [facility]  [id number]  [address]  [phone]    Call in 1 day        DISCHARGE MEDICATIONS:  New Prescriptions    CEPHALEXIN (KEFLEX) 500 MG CAPSULE    Take 1 capsule by mouth 4 times daily    MEDROXYPROGESTERONE (PROVERA) 2.5 MG TABLET    Take 4 tablets by mouth daily       (Please note that portions of this note were completed with a voice recognition program.  Efforts were made to edit the dictations but occasionally words are mis-transcribed.)    MICHAELA Fitzgerald PA-C  03/18/18 212

## 2018-03-19 NOTE — ED NOTES
Pt a&ox4, skin w/d/pink, pulses palp, 0 sob, 0 pain, 0 distress, 0 sob, pt resting in bed watching tv, speech clear, 0 problems.      Adriana Lira, GINNY  03/18/18 0708

## 2018-03-19 NOTE — ED NOTES
Pt medicated per orders, elli. Well, 0 problems, will monitor.      Claudine Momin RN  03/18/18 3853

## 2018-03-20 LAB — URINE CULTURE, ROUTINE: NORMAL

## 2018-04-19 ENCOUNTER — OFFICE VISIT (OUTPATIENT)
Dept: PEDIATRICS CLINIC | Age: 43
End: 2018-04-19
Payer: COMMERCIAL

## 2018-04-19 VITALS
RESPIRATION RATE: 16 BRPM | BODY MASS INDEX: 21.8 KG/M2 | TEMPERATURE: 97.6 F | OXYGEN SATURATION: 98 % | HEART RATE: 94 BPM | WEIGHT: 127 LBS | SYSTOLIC BLOOD PRESSURE: 104 MMHG | DIASTOLIC BLOOD PRESSURE: 64 MMHG

## 2018-04-19 DIAGNOSIS — L02.91 ABSCESS: Primary | ICD-10-CM

## 2018-04-19 PROCEDURE — 1036F TOBACCO NON-USER: CPT | Performed by: NURSE PRACTITIONER

## 2018-04-19 PROCEDURE — 99213 OFFICE O/P EST LOW 20 MIN: CPT | Performed by: NURSE PRACTITIONER

## 2018-04-19 PROCEDURE — G8427 DOCREV CUR MEDS BY ELIG CLIN: HCPCS | Performed by: NURSE PRACTITIONER

## 2018-04-19 PROCEDURE — G8420 CALC BMI NORM PARAMETERS: HCPCS | Performed by: NURSE PRACTITIONER

## 2018-04-19 RX ORDER — IBUPROFEN 200 MG
600 TABLET ORAL EVERY 6 HOURS PRN
Qty: 120 TABLET | Refills: 3 | Status: ON HOLD | OUTPATIENT
Start: 2018-04-19 | End: 2019-02-10

## 2018-04-19 RX ORDER — DOXYCYCLINE HYCLATE 100 MG
100 TABLET ORAL 2 TIMES DAILY
Qty: 20 TABLET | Refills: 0 | Status: SHIPPED | OUTPATIENT
Start: 2018-04-19 | End: 2018-04-29

## 2018-04-20 RX ORDER — FERROUS SULFATE 325(65) MG
TABLET ORAL
Refills: 11 | Status: ON HOLD | COMMUNITY
Start: 2018-03-23 | End: 2019-02-10

## 2018-04-20 ASSESSMENT — ENCOUNTER SYMPTOMS
VOMITING: 0
SHORTNESS OF BREATH: 0
SORE THROAT: 0
EYE PAIN: 0
RHINORRHEA: 0
DIARRHEA: 0
COUGH: 0
NAIL CHANGES: 0

## 2018-10-05 ENCOUNTER — APPOINTMENT (OUTPATIENT)
Dept: GENERAL RADIOLOGY | Age: 43
End: 2018-10-05
Payer: COMMERCIAL

## 2018-10-05 ENCOUNTER — HOSPITAL ENCOUNTER (EMERGENCY)
Age: 43
Discharge: HOME | End: 2018-10-05
Payer: COMMERCIAL

## 2018-10-05 VITALS
BODY MASS INDEX: 20.4 KG/M2 | HEIGHT: 64 IN | TEMPERATURE: 97.6 F | DIASTOLIC BLOOD PRESSURE: 77 MMHG | OXYGEN SATURATION: 100 % | WEIGHT: 119.5 LBS | RESPIRATION RATE: 16 BRPM | HEART RATE: 98 BPM | SYSTOLIC BLOOD PRESSURE: 112 MMHG

## 2018-10-05 DIAGNOSIS — L08.9 TOE INFECTION: Primary | ICD-10-CM

## 2018-10-05 PROCEDURE — 99283 EMERGENCY DEPT VISIT LOW MDM: CPT

## 2018-10-05 PROCEDURE — 73630 X-RAY EXAM OF FOOT: CPT

## 2018-10-05 RX ORDER — AMOXICILLIN AND CLAVULANATE POTASSIUM 875; 125 MG/1; MG/1
1 TABLET, FILM COATED ORAL 2 TIMES DAILY
Qty: 20 TABLET | Refills: 0 | Status: SHIPPED | OUTPATIENT
Start: 2018-10-05 | End: 2018-10-15

## 2018-10-05 ASSESSMENT — PAIN DESCRIPTION - ORIENTATION: ORIENTATION: RIGHT

## 2018-10-05 ASSESSMENT — PAIN DESCRIPTION - LOCATION: LOCATION: TOE (COMMENT WHICH ONE)

## 2018-10-05 ASSESSMENT — ENCOUNTER SYMPTOMS
SHORTNESS OF BREATH: 0
ABDOMINAL PAIN: 0
COUGH: 0
BACK PAIN: 0

## 2018-10-05 ASSESSMENT — PAIN SCALES - GENERAL: PAINLEVEL_OUTOF10: 9

## 2018-10-05 NOTE — ED PROVIDER NOTES
Take 1 tablet by mouth 2 times daily for 20 doses       ALLERGIES     Metronidazole and Sulfa antibiotics    FAMILY HISTORY       Family History   Problem Relation Age of Onset    High Blood Pressure Mother     Stroke Mother     Diabetes Mother     No Known Problems Father           SOCIAL HISTORY       Social History     Social History    Marital status: Single     Spouse name: N/A    Number of children: N/A    Years of education: N/A     Social History Main Topics    Smoking status: Never Smoker    Smokeless tobacco: Never Used    Alcohol use Yes      Comment: occasional    Drug use: No    Sexual activity: Not Asked     Other Topics Concern    None     Social History Narrative    None       SCREENINGS             PHYSICAL EXAM    (up to 7 for level 4, 8 or more for level 5)     ED Triage Vitals [10/05/18 0900]   BP Temp Temp Source Pulse Resp SpO2 Height Weight   112/77 97.6 °F (36.4 °C) Oral 98 16 100 % 5' 4\" (1.626 m) 119 lb 8 oz (54.2 kg)       Physical Exam   Constitutional: She is oriented to person, place, and time. She appears well-developed and well-nourished. HENT:   Head: Normocephalic and atraumatic. Right Ear: External ear normal.   Left Ear: External ear normal.   Mouth/Throat: Oropharynx is clear and moist.   Eyes: Pupils are equal, round, and reactive to light. Conjunctivae and EOM are normal.   Neck: Normal range of motion. Neck supple. Cardiovascular: Normal rate and regular rhythm. Pulmonary/Chest: Effort normal and breath sounds normal.   Abdominal: Soft. Bowel sounds are normal. She exhibits no distension. There is no tenderness. Musculoskeletal: Normal range of motion. Right foot: There is tenderness and swelling. There is normal range of motion, no bony tenderness, normal capillary refill, no crepitus, no deformity and no laceration. Feet:    Neurological: She is alert and oriented to person, place, and time. She has normal reflexes.    Skin: Skin is warm

## 2019-02-10 ENCOUNTER — APPOINTMENT (OUTPATIENT)
Dept: ULTRASOUND IMAGING | Age: 44
DRG: 892 | End: 2019-02-10
Payer: COMMERCIAL

## 2019-02-10 ENCOUNTER — HOSPITAL ENCOUNTER (INPATIENT)
Age: 44
LOS: 8 days | Discharge: HOME | DRG: 892 | End: 2019-02-19
Attending: FAMILY MEDICINE | Admitting: INTERNAL MEDICINE
Payer: COMMERCIAL

## 2019-02-10 ENCOUNTER — APPOINTMENT (OUTPATIENT)
Dept: CT IMAGING | Age: 44
DRG: 892 | End: 2019-02-10
Payer: COMMERCIAL

## 2019-02-10 DIAGNOSIS — N39.0 URINARY TRACT INFECTION ASSOCIATED WITH CATHETERIZATION OF URINARY TRACT, UNSPECIFIED INDWELLING URINARY CATHETER TYPE, INITIAL ENCOUNTER (HCC): ICD-10-CM

## 2019-02-10 DIAGNOSIS — D64.9 ANEMIA, UNSPECIFIED TYPE: ICD-10-CM

## 2019-02-10 DIAGNOSIS — T83.511A URINARY TRACT INFECTION ASSOCIATED WITH CATHETERIZATION OF URINARY TRACT, UNSPECIFIED INDWELLING URINARY CATHETER TYPE, INITIAL ENCOUNTER (HCC): ICD-10-CM

## 2019-02-10 DIAGNOSIS — R62.7 ADULT FAILURE TO THRIVE: Primary | ICD-10-CM

## 2019-02-10 PROBLEM — G93.40 ENCEPHALOPATHY: Status: ACTIVE | Noted: 2019-02-10

## 2019-02-10 LAB
ACANTHOCYTES: ABNORMAL
ALBUMIN SERPL-MCNC: 4.2 G/DL (ref 3.5–4.6)
ALP BLD-CCNC: 60 U/L (ref 40–130)
ALT SERPL-CCNC: 36 U/L (ref 0–33)
AMMONIA: 25 UMOL/L (ref 11–51)
AMPHETAMINE SCREEN, URINE: NORMAL
AMYLASE: 99 U/L (ref 22–93)
ANION GAP SERPL CALCULATED.3IONS-SCNC: 11 MEQ/L (ref 9–15)
ANISOCYTOSIS: ABNORMAL
AST SERPL-CCNC: 42 U/L (ref 0–35)
BACTERIA: ABNORMAL /HPF
BARBITURATE SCREEN URINE: NORMAL
BASOPHILS ABSOLUTE: 0 K/UL (ref 0–0.2)
BASOPHILS RELATIVE PERCENT: 1.1 %
BENZODIAZEPINE SCREEN, URINE: NORMAL
BILIRUB SERPL-MCNC: 0.6 MG/DL (ref 0.2–0.7)
BILIRUBIN URINE: NEGATIVE
BLOOD, URINE: ABNORMAL
BUN BLDV-MCNC: 8 MG/DL (ref 6–20)
CALCIUM SERPL-MCNC: 9.5 MG/DL (ref 8.5–9.9)
CANNABINOID SCREEN URINE: NORMAL
CHLORIDE BLD-SCNC: 109 MEQ/L (ref 95–107)
CLARITY: ABNORMAL
CO2: 23 MEQ/L (ref 20–31)
COCAINE METABOLITE SCREEN URINE: NORMAL
COLOR: ABNORMAL
CREAT SERPL-MCNC: 0.52 MG/DL (ref 0.5–0.9)
EOSINOPHILS ABSOLUTE: 0 K/UL (ref 0–0.7)
EOSINOPHILS RELATIVE PERCENT: 1 %
EPITHELIAL CELLS, UA: ABNORMAL /HPF (ref 0–5)
ETHANOL PERCENT: NORMAL G/DL
ETHANOL: <10 MG/DL (ref 0–0.08)
FERRITIN: 12.9 NG/ML (ref 13–150)
FOLATE: >20 NG/ML (ref 7.3–26.1)
GFR AFRICAN AMERICAN: >60
GFR AFRICAN AMERICAN: >60
GFR NON-AFRICAN AMERICAN: >60
GFR NON-AFRICAN AMERICAN: >60
GLOBULIN: 4.3 G/DL (ref 2.3–3.5)
GLUCOSE BLD-MCNC: 93 MG/DL (ref 70–99)
GLUCOSE URINE: NEGATIVE MG/DL
HCG, URINE, POC: NEGATIVE
HCT VFR BLD CALC: 23 % (ref 37–47)
HEMOGLOBIN: 7.6 G/DL (ref 12–16)
HYALINE CASTS: ABNORMAL /HPF (ref 0–5)
IRON SATURATION: 3 % (ref 11–46)
IRON: 14 UG/DL (ref 37–145)
KETONES, URINE: NEGATIVE MG/DL
LEUKOCYTE ESTERASE, URINE: ABNORMAL
LIPASE: 58 U/L (ref 12–95)
LYMPHOCYTES ABSOLUTE: 0.9 K/UL (ref 1–4.8)
LYMPHOCYTES RELATIVE PERCENT: 41 %
Lab: NORMAL
Lab: NORMAL
MCH RBC QN AUTO: 25.1 PG (ref 27–31.3)
MCHC RBC AUTO-ENTMCNC: 33.2 % (ref 33–37)
MCV RBC AUTO: 75.7 FL (ref 82–100)
MONOCYTES ABSOLUTE: 0.2 K/UL (ref 0.2–0.8)
MONOCYTES RELATIVE PERCENT: 7.5 %
NEGATIVE QC PASS/FAIL: NORMAL
NEUTROPHILS ABSOLUTE: 1.2 K/UL (ref 1.4–6.5)
NEUTROPHILS RELATIVE PERCENT: 51 %
NITRITE, URINE: NEGATIVE
OPIATE SCREEN URINE: NORMAL
OVALOCYTES: ABNORMAL
PDW BLD-RTO: 15.6 % (ref 11.5–14.5)
PERFORMED ON: ABNORMAL
PH UA: 7 (ref 5–9)
PHENCYCLIDINE SCREEN URINE: NORMAL
PLATELET # BLD: 177 K/UL (ref 130–400)
PLATELET SLIDE REVIEW: NORMAL
POC CREATININE WHOLE BLOOD: 0.5
POC CREATININE: 0.5 MG/DL (ref 0.6–1.1)
POC SAMPLE TYPE: ABNORMAL
POIKILOCYTES: ABNORMAL
POSITIVE QC PASS/FAIL: NORMAL
POTASSIUM SERPL-SCNC: 3.8 MEQ/L (ref 3.4–4.9)
PROTEIN UA: ABNORMAL MG/DL
RBC # BLD: 3.03 M/UL (ref 4.2–5.4)
RBC UA: ABNORMAL /HPF (ref 0–5)
SODIUM BLD-SCNC: 143 MEQ/L (ref 135–144)
SPECIFIC GRAVITY UA: 1.02 (ref 1–1.03)
T4 FREE: 1.11 NG/DL (ref 0.84–1.68)
TOTAL IRON BINDING CAPACITY: 409 UG/DL (ref 178–450)
TOTAL PROTEIN: 8.5 G/DL (ref 6.3–8)
TSH SERPL DL<=0.05 MIU/L-ACNC: 0.82 UIU/ML (ref 0.44–3.86)
URINE REFLEX TO CULTURE: YES
UROBILINOGEN, URINE: 1 E.U./DL
VITAMIN B-12: 573 PG/ML (ref 232–1245)
VITAMIN D 25-HYDROXY: 39.7 NG/ML (ref 30–100)
WBC # BLD: 2.3 K/UL (ref 4.8–10.8)
WBC UA: ABNORMAL /HPF (ref 0–5)

## 2019-02-10 PROCEDURE — 81001 URINALYSIS AUTO W/SCOPE: CPT

## 2019-02-10 PROCEDURE — G0378 HOSPITAL OBSERVATION PER HR: HCPCS

## 2019-02-10 PROCEDURE — 6360000002 HC RX W HCPCS: Performed by: INTERNAL MEDICINE

## 2019-02-10 PROCEDURE — 84443 ASSAY THYROID STIM HORMONE: CPT

## 2019-02-10 PROCEDURE — 2580000003 HC RX 258: Performed by: INTERNAL MEDICINE

## 2019-02-10 PROCEDURE — 2580000003 HC RX 258: Performed by: PHYSICIAN ASSISTANT

## 2019-02-10 PROCEDURE — 74177 CT ABD & PELVIS W/CONTRAST: CPT

## 2019-02-10 PROCEDURE — 82306 VITAMIN D 25 HYDROXY: CPT

## 2019-02-10 PROCEDURE — 96366 THER/PROPH/DIAG IV INF ADDON: CPT

## 2019-02-10 PROCEDURE — 36415 COLL VENOUS BLD VENIPUNCTURE: CPT

## 2019-02-10 PROCEDURE — 83540 ASSAY OF IRON: CPT

## 2019-02-10 PROCEDURE — 6360000004 HC RX CONTRAST MEDICATION: Performed by: FAMILY MEDICINE

## 2019-02-10 PROCEDURE — 76830 TRANSVAGINAL US NON-OB: CPT

## 2019-02-10 PROCEDURE — 82746 ASSAY OF FOLIC ACID SERUM: CPT

## 2019-02-10 PROCEDURE — 6360000002 HC RX W HCPCS: Performed by: FAMILY MEDICINE

## 2019-02-10 PROCEDURE — 83690 ASSAY OF LIPASE: CPT

## 2019-02-10 PROCEDURE — 82607 VITAMIN B-12: CPT

## 2019-02-10 PROCEDURE — 70450 CT HEAD/BRAIN W/O DYE: CPT

## 2019-02-10 PROCEDURE — 96365 THER/PROPH/DIAG IV INF INIT: CPT

## 2019-02-10 PROCEDURE — 87086 URINE CULTURE/COLONY COUNT: CPT

## 2019-02-10 PROCEDURE — G0480 DRUG TEST DEF 1-7 CLASSES: HCPCS

## 2019-02-10 PROCEDURE — 84439 ASSAY OF FREE THYROXINE: CPT

## 2019-02-10 PROCEDURE — 82728 ASSAY OF FERRITIN: CPT

## 2019-02-10 PROCEDURE — 83550 IRON BINDING TEST: CPT

## 2019-02-10 PROCEDURE — 80307 DRUG TEST PRSMV CHEM ANLYZR: CPT

## 2019-02-10 PROCEDURE — 82140 ASSAY OF AMMONIA: CPT

## 2019-02-10 PROCEDURE — 96367 TX/PROPH/DG ADDL SEQ IV INF: CPT

## 2019-02-10 PROCEDURE — 85025 COMPLETE CBC W/AUTO DIFF WBC: CPT

## 2019-02-10 PROCEDURE — 82150 ASSAY OF AMYLASE: CPT

## 2019-02-10 PROCEDURE — 80053 COMPREHEN METABOLIC PANEL: CPT

## 2019-02-10 PROCEDURE — 76856 US EXAM PELVIC COMPLETE: CPT

## 2019-02-10 PROCEDURE — 99285 EMERGENCY DEPT VISIT HI MDM: CPT

## 2019-02-10 RX ORDER — ONDANSETRON 2 MG/ML
4 INJECTION INTRAMUSCULAR; INTRAVENOUS EVERY 6 HOURS PRN
Status: DISCONTINUED | OUTPATIENT
Start: 2019-02-10 | End: 2019-02-19 | Stop reason: HOSPADM

## 2019-02-10 RX ORDER — CIPROFLOXACIN 2 MG/ML
400 INJECTION, SOLUTION INTRAVENOUS ONCE
Status: DISCONTINUED | OUTPATIENT
Start: 2019-02-10 | End: 2019-02-10

## 2019-02-10 RX ORDER — IBUPROFEN 200 MG
200 TABLET ORAL EVERY 6 HOURS PRN
COMMUNITY
End: 2019-02-22 | Stop reason: ALTCHOICE

## 2019-02-10 RX ORDER — SODIUM CHLORIDE 0.9 % (FLUSH) 0.9 %
10 SYRINGE (ML) INJECTION PRN
Status: DISCONTINUED | OUTPATIENT
Start: 2019-02-10 | End: 2019-02-19 | Stop reason: HOSPADM

## 2019-02-10 RX ORDER — SODIUM CHLORIDE 0.9 % (FLUSH) 0.9 %
10 SYRINGE (ML) INJECTION EVERY 12 HOURS SCHEDULED
Status: DISCONTINUED | OUTPATIENT
Start: 2019-02-10 | End: 2019-02-19 | Stop reason: HOSPADM

## 2019-02-10 RX ADMIN — IOPAMIDOL 100 ML: 612 INJECTION, SOLUTION INTRAVENOUS at 14:16

## 2019-02-10 RX ADMIN — CIPROFLOXACIN 400 MG: 2 INJECTION, SOLUTION INTRAVENOUS at 16:02

## 2019-02-10 RX ADMIN — Medication 10 ML: at 21:05

## 2019-02-10 RX ADMIN — IRON SUCROSE 300 MG: 20 INJECTION, SOLUTION INTRAVENOUS at 18:51

## 2019-02-10 ASSESSMENT — ENCOUNTER SYMPTOMS
ANAL BLEEDING: 0
ABDOMINAL PAIN: 1
DIARRHEA: 0
CONSTIPATION: 0
BLOOD IN STOOL: 0
RESPIRATORY NEGATIVE: 1
RECTAL PAIN: 0
EYES NEGATIVE: 1
NAUSEA: 1
VOMITING: 0
ABDOMINAL DISTENTION: 0

## 2019-02-10 ASSESSMENT — PAIN DESCRIPTION - PAIN TYPE: TYPE: ACUTE PAIN

## 2019-02-10 ASSESSMENT — PAIN DESCRIPTION - LOCATION: LOCATION: ABDOMEN

## 2019-02-10 ASSESSMENT — PAIN SCALES - GENERAL
PAINLEVEL_OUTOF10: 0
PAINLEVEL_OUTOF10: 0
PAINLEVEL_OUTOF10: 4

## 2019-02-10 ASSESSMENT — PAIN DESCRIPTION - DESCRIPTORS: DESCRIPTORS: ACHING

## 2019-02-10 ASSESSMENT — PAIN DESCRIPTION - ORIENTATION: ORIENTATION: LEFT;LOWER

## 2019-02-10 ASSESSMENT — PAIN DESCRIPTION - FREQUENCY: FREQUENCY: INTERMITTENT

## 2019-02-11 ENCOUNTER — APPOINTMENT (OUTPATIENT)
Dept: MRI IMAGING | Age: 44
DRG: 892 | End: 2019-02-11
Payer: COMMERCIAL

## 2019-02-11 ENCOUNTER — APPOINTMENT (OUTPATIENT)
Dept: CT IMAGING | Age: 44
DRG: 892 | End: 2019-02-11
Payer: COMMERCIAL

## 2019-02-11 LAB
ALBUMIN SERPL-MCNC: 3.7 G/DL (ref 3.5–4.6)
ALP BLD-CCNC: 57 U/L (ref 40–130)
ALT SERPL-CCNC: 34 U/L (ref 0–33)
ANION GAP SERPL CALCULATED.3IONS-SCNC: 14 MEQ/L (ref 9–15)
AST SERPL-CCNC: 38 U/L (ref 0–35)
BASOPHILS ABSOLUTE: 0 K/UL (ref 0–0.2)
BASOPHILS RELATIVE PERCENT: 1.1 %
BILIRUB SERPL-MCNC: 0.5 MG/DL (ref 0.2–0.7)
BUN BLDV-MCNC: 6 MG/DL (ref 6–20)
CALCIUM SERPL-MCNC: 9.2 MG/DL (ref 8.5–9.9)
CHLORIDE BLD-SCNC: 105 MEQ/L (ref 95–107)
CO2: 22 MEQ/L (ref 20–31)
CREAT SERPL-MCNC: 0.58 MG/DL (ref 0.5–0.9)
EOSINOPHILS ABSOLUTE: 0.1 K/UL (ref 0–0.7)
EOSINOPHILS RELATIVE PERCENT: 3 %
GFR AFRICAN AMERICAN: >60
GFR NON-AFRICAN AMERICAN: >60
GLOBULIN: 4.1 G/DL (ref 2.3–3.5)
GLUCOSE BLD-MCNC: 102 MG/DL (ref 70–99)
HCT VFR BLD CALC: 20.6 % (ref 37–47)
HEMOGLOBIN: 7 G/DL (ref 12–16)
LYMPHOCYTES ABSOLUTE: 0.6 K/UL (ref 1–4.8)
LYMPHOCYTES RELATIVE PERCENT: 29.4 %
MAGNESIUM: 2.1 MG/DL (ref 1.7–2.4)
MCH RBC QN AUTO: 25.5 PG (ref 27–31.3)
MCHC RBC AUTO-ENTMCNC: 33.9 % (ref 33–37)
MCV RBC AUTO: 75.2 FL (ref 82–100)
MONOCYTES ABSOLUTE: 0.3 K/UL (ref 0.2–0.8)
MONOCYTES RELATIVE PERCENT: 12.6 %
NEUTROPHILS ABSOLUTE: 1.1 K/UL (ref 1.4–6.5)
NEUTROPHILS RELATIVE PERCENT: 53.9 %
PDW BLD-RTO: 15.4 % (ref 11.5–14.5)
PLATELET # BLD: 169 K/UL (ref 130–400)
POTASSIUM REFLEX MAGNESIUM: 3.3 MEQ/L (ref 3.4–4.9)
RBC # BLD: 2.73 M/UL (ref 4.2–5.4)
RETICULOCYTE ABSOLUTE COUNT: 0.07 M/CUMM (ref 0.02–0.11)
RETICULOCYTE COUNT PCT: 2.7 % (ref 0.6–2.2)
SLIDE REVIEW: ABNORMAL
SODIUM BLD-SCNC: 141 MEQ/L (ref 135–144)
TOTAL PROTEIN: 7.8 G/DL (ref 6.3–8)
VITAMIN D 25-HYDROXY: 36.6 NG/ML (ref 30–100)
WBC # BLD: 2.1 K/UL (ref 4.8–10.8)

## 2019-02-11 PROCEDURE — 2580000003 HC RX 258: Performed by: INTERNAL MEDICINE

## 2019-02-11 PROCEDURE — 99254 IP/OBS CNSLTJ NEW/EST MOD 60: CPT | Performed by: INTERNAL MEDICINE

## 2019-02-11 PROCEDURE — 6370000000 HC RX 637 (ALT 250 FOR IP): Performed by: PSYCHIATRY & NEUROLOGY

## 2019-02-11 PROCEDURE — 82306 VITAMIN D 25 HYDROXY: CPT

## 2019-02-11 PROCEDURE — 6360000002 HC RX W HCPCS: Performed by: INTERNAL MEDICINE

## 2019-02-11 PROCEDURE — 70553 MRI BRAIN STEM W/O & W/DYE: CPT

## 2019-02-11 PROCEDURE — 1210000000 HC MED SURG R&B

## 2019-02-11 PROCEDURE — 83735 ASSAY OF MAGNESIUM: CPT

## 2019-02-11 PROCEDURE — 6360000004 HC RX CONTRAST MEDICATION: Performed by: INTERNAL MEDICINE

## 2019-02-11 PROCEDURE — 95816 EEG AWAKE AND DROWSY: CPT

## 2019-02-11 PROCEDURE — 87529 HSV DNA AMP PROBE: CPT

## 2019-02-11 PROCEDURE — 80053 COMPREHEN METABOLIC PANEL: CPT

## 2019-02-11 PROCEDURE — 88112 CYTOPATH CELL ENHANCE TECH: CPT

## 2019-02-11 PROCEDURE — 6370000000 HC RX 637 (ALT 250 FOR IP): Performed by: INTERNAL MEDICINE

## 2019-02-11 PROCEDURE — 86592 SYPHILIS TEST NON-TREP QUAL: CPT

## 2019-02-11 PROCEDURE — 2580000003 HC RX 258: Performed by: PHYSICIAN ASSISTANT

## 2019-02-11 PROCEDURE — 85025 COMPLETE CBC W/AUTO DIFF WBC: CPT

## 2019-02-11 PROCEDURE — 85046 RETICYTE/HGB CONCENTRATE: CPT

## 2019-02-11 PROCEDURE — 86480 TB TEST CELL IMMUN MEASURE: CPT

## 2019-02-11 PROCEDURE — 99253 IP/OBS CNSLTJ NEW/EST LOW 45: CPT | Performed by: PSYCHIATRY & NEUROLOGY

## 2019-02-11 PROCEDURE — A9579 GAD-BASE MR CONTRAST NOS,1ML: HCPCS | Performed by: INTERNAL MEDICINE

## 2019-02-11 PROCEDURE — 36415 COLL VENOUS BLD VENIPUNCTURE: CPT

## 2019-02-11 PROCEDURE — 74178 CT ABD&PLV WO CNTR FLWD CNTR: CPT

## 2019-02-11 PROCEDURE — 87389 HIV-1 AG W/HIV-1&-2 AB AG IA: CPT

## 2019-02-11 RX ORDER — SODIUM CHLORIDE 0.9 % (FLUSH) 0.9 %
10 SYRINGE (ML) INJECTION PRN
Status: DISCONTINUED | OUTPATIENT
Start: 2019-02-11 | End: 2019-02-16

## 2019-02-11 RX ORDER — POTASSIUM CHLORIDE 20 MEQ/1
60 TABLET, EXTENDED RELEASE ORAL ONCE
Status: COMPLETED | OUTPATIENT
Start: 2019-02-11 | End: 2019-02-11

## 2019-02-11 RX ORDER — SODIUM CHLORIDE 0.9 % (FLUSH) 0.9 %
10 SYRINGE (ML) INJECTION EVERY 12 HOURS SCHEDULED
Status: DISCONTINUED | OUTPATIENT
Start: 2019-02-11 | End: 2019-02-16

## 2019-02-11 RX ORDER — LIDOCAINE HYDROCHLORIDE 20 MG/ML
5 INJECTION, SOLUTION INFILTRATION; PERINEURAL
Status: DISCONTINUED | OUTPATIENT
Start: 2019-02-11 | End: 2019-02-19 | Stop reason: HOSPADM

## 2019-02-11 RX ORDER — CITALOPRAM 10 MG/1
10 TABLET ORAL DAILY
Status: DISCONTINUED | OUTPATIENT
Start: 2019-02-11 | End: 2019-02-19 | Stop reason: HOSPADM

## 2019-02-11 RX ADMIN — CITALOPRAM HYDROBROMIDE 10 MG: 10 TABLET ORAL at 17:24

## 2019-02-11 RX ADMIN — IOPAMIDOL 100 ML: 612 INJECTION, SOLUTION INTRAVENOUS at 08:02

## 2019-02-11 RX ADMIN — GADOTERIDOL 10 ML: 279.3 INJECTION, SOLUTION INTRAVENOUS at 09:20

## 2019-02-11 RX ADMIN — Medication 10 ML: at 11:35

## 2019-02-11 RX ADMIN — ACYCLOVIR SODIUM 270 MG: 50 INJECTION, SOLUTION INTRAVENOUS at 21:15

## 2019-02-11 RX ADMIN — POTASSIUM CHLORIDE 60 MEQ: 20 TABLET, EXTENDED RELEASE ORAL at 11:35

## 2019-02-11 RX ADMIN — Medication 10 ML: at 21:17

## 2019-02-11 ASSESSMENT — PAIN SCALES - GENERAL: PAINLEVEL_OUTOF10: 0

## 2019-02-12 ENCOUNTER — APPOINTMENT (OUTPATIENT)
Dept: MRI IMAGING | Age: 44
DRG: 892 | End: 2019-02-12
Payer: COMMERCIAL

## 2019-02-12 LAB
ANION GAP SERPL CALCULATED.3IONS-SCNC: 12 MEQ/L (ref 9–15)
BASOPHILS ABSOLUTE: 0 K/UL (ref 0–0.2)
BASOPHILS RELATIVE PERCENT: 1.7 %
BUN BLDV-MCNC: 6 MG/DL (ref 6–20)
C-REACTIVE PROTEIN, HIGH SENSITIVITY: 0.6 MG/L (ref 0–5)
CALCIUM SERPL-MCNC: 8.9 MG/DL (ref 8.5–9.9)
CHLORIDE BLD-SCNC: 106 MEQ/L (ref 95–107)
CO2: 20 MEQ/L (ref 20–31)
CREAT SERPL-MCNC: 0.49 MG/DL (ref 0.5–0.9)
EOSINOPHILS ABSOLUTE: 0.1 K/UL (ref 0–0.7)
EOSINOPHILS RELATIVE PERCENT: 3.5 %
GFR AFRICAN AMERICAN: >60
GFR NON-AFRICAN AMERICAN: >60
GLUCOSE BLD-MCNC: 114 MG/DL (ref 60–115)
GLUCOSE BLD-MCNC: 89 MG/DL (ref 70–99)
HCT VFR BLD CALC: 21.6 % (ref 37–47)
HEMOGLOBIN: 7.1 G/DL (ref 12–16)
LYMPHOCYTES ABSOLUTE: 0.6 K/UL (ref 1–4.8)
LYMPHOCYTES RELATIVE PERCENT: 26.5 %
MCH RBC QN AUTO: 24.6 PG (ref 27–31.3)
MCHC RBC AUTO-ENTMCNC: 32.7 % (ref 33–37)
MCV RBC AUTO: 75.4 FL (ref 82–100)
MONOCYTES ABSOLUTE: 0.3 K/UL (ref 0.2–0.8)
MONOCYTES RELATIVE PERCENT: 12.5 %
NEUTROPHILS ABSOLUTE: 1.2 K/UL (ref 1.4–6.5)
NEUTROPHILS RELATIVE PERCENT: 55.8 %
PDW BLD-RTO: 15.7 % (ref 11.5–14.5)
PERFORMED ON: NORMAL
PLATELET # BLD: 175 K/UL (ref 130–400)
POTASSIUM REFLEX MAGNESIUM: 3.9 MEQ/L (ref 3.4–4.9)
RBC # BLD: 2.87 M/UL (ref 4.2–5.4)
SEDIMENTATION RATE, ERYTHROCYTE: 96 MM (ref 0–20)
SODIUM BLD-SCNC: 138 MEQ/L (ref 135–144)
URINE CULTURE, ROUTINE: NORMAL
WBC # BLD: 2.1 K/UL (ref 4.8–10.8)

## 2019-02-12 PROCEDURE — 6360000002 HC RX W HCPCS: Performed by: INTERNAL MEDICINE

## 2019-02-12 PROCEDURE — 2580000003 HC RX 258: Performed by: INTERNAL MEDICINE

## 2019-02-12 PROCEDURE — 99231 SBSQ HOSP IP/OBS SF/LOW 25: CPT | Performed by: INTERNAL MEDICINE

## 2019-02-12 PROCEDURE — 1210000000 HC MED SURG R&B

## 2019-02-12 PROCEDURE — 86225 DNA ANTIBODY NATIVE: CPT

## 2019-02-12 PROCEDURE — 80048 BASIC METABOLIC PNL TOTAL CA: CPT

## 2019-02-12 PROCEDURE — 86141 C-REACTIVE PROTEIN HS: CPT

## 2019-02-12 PROCEDURE — 85652 RBC SED RATE AUTOMATED: CPT

## 2019-02-12 PROCEDURE — 36415 COLL VENOUS BLD VENIPUNCTURE: CPT

## 2019-02-12 PROCEDURE — 70544 MR ANGIOGRAPHY HEAD W/O DYE: CPT

## 2019-02-12 PROCEDURE — 85025 COMPLETE CBC W/AUTO DIFF WBC: CPT

## 2019-02-12 PROCEDURE — 6370000000 HC RX 637 (ALT 250 FOR IP): Performed by: PSYCHIATRY & NEUROLOGY

## 2019-02-12 RX ADMIN — Medication 10 ML: at 10:10

## 2019-02-12 RX ADMIN — CITALOPRAM HYDROBROMIDE 10 MG: 10 TABLET ORAL at 12:34

## 2019-02-12 RX ADMIN — ACYCLOVIR SODIUM 270 MG: 50 INJECTION, SOLUTION INTRAVENOUS at 21:04

## 2019-02-12 RX ADMIN — ACYCLOVIR SODIUM 270 MG: 50 INJECTION, SOLUTION INTRAVENOUS at 05:23

## 2019-02-12 RX ADMIN — Medication 10 ML: at 21:05

## 2019-02-12 ASSESSMENT — PAIN SCALES - GENERAL
PAINLEVEL_OUTOF10: 0
PAINLEVEL_OUTOF10: 0

## 2019-02-13 ENCOUNTER — APPOINTMENT (OUTPATIENT)
Dept: INTERVENTIONAL RADIOLOGY/VASCULAR | Age: 44
DRG: 892 | End: 2019-02-13
Payer: COMMERCIAL

## 2019-02-13 LAB
ACANTHOCYTES: ABNORMAL
ANION GAP SERPL CALCULATED.3IONS-SCNC: 12 MEQ/L (ref 9–15)
ANISOCYTOSIS: ABNORMAL
APPEARANCE CSF: CLEAR
APPEARANCE CSF: CLEAR
BASOPHILS ABSOLUTE: 0 K/UL (ref 0–0.2)
BASOPHILS RELATIVE PERCENT: 0 %
BUN BLDV-MCNC: 7 MG/DL (ref 6–20)
CALCIUM SERPL-MCNC: 9.1 MG/DL (ref 8.5–9.9)
CHLORIDE BLD-SCNC: 104 MEQ/L (ref 95–107)
CLOT EVALUATION CSF: NORMAL
CLOT EVALUATION CSF: NORMAL
CO2: 22 MEQ/L (ref 20–31)
COLOR CSF: COLORLESS
COLOR CSF: COLORLESS
CREAT SERPL-MCNC: 0.52 MG/DL (ref 0.5–0.9)
EOSINOPHILS ABSOLUTE: 0.1 K/UL (ref 0–0.7)
EOSINOPHILS RELATIVE PERCENT: 3 %
GFR AFRICAN AMERICAN: >60
GFR NON-AFRICAN AMERICAN: >60
GLUCOSE BLD-MCNC: 90 MG/DL (ref 70–99)
GLUCOSE, CSF: 52 MG/DL (ref 50–70)
HCT VFR BLD CALC: 23.4 % (ref 37–47)
HEMOGLOBIN: 7.5 G/DL (ref 12–16)
LYMPHOCYTES ABSOLUTE: 1 K/UL (ref 1–4.8)
LYMPHOCYTES RELATIVE PERCENT: 33 %
MCH RBC QN AUTO: 24.4 PG (ref 27–31.3)
MCHC RBC AUTO-ENTMCNC: 32.3 % (ref 33–37)
MCV RBC AUTO: 75.6 FL (ref 82–100)
MONOCYTES ABSOLUTE: 0.3 K/UL (ref 0.2–0.8)
MONOCYTES RELATIVE PERCENT: 9 %
NEUTROPHILS ABSOLUTE: 1.6 K/UL (ref 1.4–6.5)
NEUTROPHILS RELATIVE PERCENT: 55 %
NO DIFFERENTIAL CSF: NORMAL
NO DIFFERENTIAL CSF: NORMAL
OVALOCYTES: ABNORMAL
PDW BLD-RTO: 16.1 % (ref 11.5–14.5)
PLATELET # BLD: 188 K/UL (ref 130–400)
PLATELET SLIDE REVIEW: ADEQUATE
POIKILOCYTES: ABNORMAL
POLYCHROMASIA: ABNORMAL
POTASSIUM REFLEX MAGNESIUM: 4.1 MEQ/L (ref 3.4–4.9)
PROTEIN CSF: 56 MG/DL (ref 15–45)
RBC # BLD: 3.09 M/UL (ref 4.2–5.4)
RBC CSF: 215 K/UL
RBC CSF: 739 K/UL
RPR: NORMAL
SLIDE REVIEW: ABNORMAL
SODIUM BLD-SCNC: 138 MEQ/L (ref 135–144)
STOMATOCYTES: ABNORMAL
TUBE NUMBER CSF: NORMAL
TUBE NUMBER CSF: NORMAL
VOLUME CSF: 10 ML
VOLUME CSF: 10 ML
WBC # BLD: 2.9 K/UL (ref 4.8–10.8)
WBC CSF: 0 K/UL (ref 0–8)
WBC CSF: 2 K/UL (ref 0–8)

## 2019-02-13 PROCEDURE — 83916 OLIGOCLONAL BANDS: CPT

## 2019-02-13 PROCEDURE — 6360000002 HC RX W HCPCS: Performed by: INTERNAL MEDICINE

## 2019-02-13 PROCEDURE — 82040 ASSAY OF SERUM ALBUMIN: CPT

## 2019-02-13 PROCEDURE — 82945 GLUCOSE OTHER FLUID: CPT

## 2019-02-13 PROCEDURE — 2580000003 HC RX 258: Performed by: INTERNAL MEDICINE

## 2019-02-13 PROCEDURE — 86789 WEST NILE VIRUS ANTIBODY: CPT

## 2019-02-13 PROCEDURE — 99232 SBSQ HOSP IP/OBS MODERATE 35: CPT | Performed by: INTERNAL MEDICINE

## 2019-02-13 PROCEDURE — 89050 BODY FLUID CELL COUNT: CPT

## 2019-02-13 PROCEDURE — 85025 COMPLETE CBC W/AUTO DIFF WBC: CPT

## 2019-02-13 PROCEDURE — 1210000000 HC MED SURG R&B

## 2019-02-13 PROCEDURE — 77003 FLUOROGUIDE FOR SPINE INJECT: CPT | Performed by: RADIOLOGY

## 2019-02-13 PROCEDURE — 87205 SMEAR GRAM STAIN: CPT

## 2019-02-13 PROCEDURE — 87102 FUNGUS ISOLATION CULTURE: CPT

## 2019-02-13 PROCEDURE — 99253 IP/OBS CNSLTJ NEW/EST LOW 45: CPT | Performed by: RADIOLOGY

## 2019-02-13 PROCEDURE — 62270 DX LMBR SPI PNXR: CPT | Performed by: RADIOLOGY

## 2019-02-13 PROCEDURE — 86788 WEST NILE VIRUS AB IGM: CPT

## 2019-02-13 PROCEDURE — 82784 ASSAY IGA/IGD/IGG/IGM EACH: CPT

## 2019-02-13 PROCEDURE — 6370000000 HC RX 637 (ALT 250 FOR IP): Performed by: PSYCHIATRY & NEUROLOGY

## 2019-02-13 PROCEDURE — 86592 SYPHILIS TEST NON-TREP QUAL: CPT

## 2019-02-13 PROCEDURE — 80048 BASIC METABOLIC PNL TOTAL CA: CPT

## 2019-02-13 PROCEDURE — 84157 ASSAY OF PROTEIN OTHER: CPT

## 2019-02-13 PROCEDURE — 36415 COLL VENOUS BLD VENIPUNCTURE: CPT

## 2019-02-13 PROCEDURE — 87899 AGENT NOS ASSAY W/OPTIC: CPT

## 2019-02-13 PROCEDURE — 86617 LYME DISEASE ANTIBODY: CPT

## 2019-02-13 PROCEDURE — 83873 ASSAY OF CSF PROTEIN: CPT

## 2019-02-13 PROCEDURE — 87116 MYCOBACTERIA CULTURE: CPT

## 2019-02-13 PROCEDURE — 87070 CULTURE OTHR SPECIMN AEROBIC: CPT

## 2019-02-13 PROCEDURE — 82042 OTHER SOURCE ALBUMIN QUAN EA: CPT

## 2019-02-13 RX ADMIN — ACYCLOVIR SODIUM 270 MG: 50 INJECTION, SOLUTION INTRAVENOUS at 20:18

## 2019-02-13 RX ADMIN — ACYCLOVIR SODIUM 270 MG: 50 INJECTION, SOLUTION INTRAVENOUS at 05:08

## 2019-02-13 RX ADMIN — CITALOPRAM HYDROBROMIDE 10 MG: 10 TABLET ORAL at 09:44

## 2019-02-13 RX ADMIN — Medication 10 ML: at 20:24

## 2019-02-13 RX ADMIN — Medication 10 ML: at 09:44

## 2019-02-14 LAB
ANION GAP SERPL CALCULATED.3IONS-SCNC: 12 MEQ/L (ref 9–15)
ANISOCYTOSIS: ABNORMAL
ATYPICAL LYMPHOCYTE RELATIVE PERCENT: 1 %
BASOPHILS ABSOLUTE: 0 K/UL (ref 0–0.2)
BASOPHILS RELATIVE PERCENT: 1.4 %
BUN BLDV-MCNC: 8 MG/DL (ref 6–20)
CALCIUM SERPL-MCNC: 9.3 MG/DL (ref 8.5–9.9)
CHLORIDE BLD-SCNC: 103 MEQ/L (ref 95–107)
CO2: 22 MEQ/L (ref 20–31)
CREAT SERPL-MCNC: 0.45 MG/DL (ref 0.5–0.9)
CRYPTOCOCCAL ANTIGEN CSF: NEGATIVE
DOUBLE STRANDED DNA AB, IGG: NORMAL
EOSINOPHILS ABSOLUTE: 0.2 K/UL (ref 0–0.7)
EOSINOPHILS RELATIVE PERCENT: 6 %
GFR AFRICAN AMERICAN: >60
GFR NON-AFRICAN AMERICAN: >60
GLUCOSE BLD-MCNC: 91 MG/DL (ref 70–99)
HCT VFR BLD CALC: 27 % (ref 37–47)
HEMOGLOBIN: 8.7 G/DL (ref 12–16)
HIV 1,2 COMBO ANTIGEN/ANTIBODY: REACTIVE
HYPOCHROMIA: ABNORMAL
LYMPHOCYTES ABSOLUTE: 0.7 K/UL (ref 1–4.8)
LYMPHOCYTES RELATIVE PERCENT: 25 %
MACROCYTES: ABNORMAL
MCH RBC QN AUTO: 25 PG (ref 27–31.3)
MCHC RBC AUTO-ENTMCNC: 32.2 % (ref 33–37)
MCV RBC AUTO: 77.6 FL (ref 82–100)
METAMYELOCYTES RELATIVE PERCENT: 1 %
MICROCYTES: ABNORMAL
MONOCYTES ABSOLUTE: 0.3 K/UL (ref 0.2–0.8)
MONOCYTES RELATIVE PERCENT: 10.5 %
NEUTROPHILS ABSOLUTE: 1.5 K/UL (ref 1.4–6.5)
NEUTROPHILS RELATIVE PERCENT: 57 %
NUCLEATED RED BLOOD CELLS: 1 /100 WBC
OVALOCYTES: ABNORMAL
PDW BLD-RTO: 16.4 % (ref 11.5–14.5)
PLATELET # BLD: 173 K/UL (ref 130–400)
PLATELET SLIDE REVIEW: ADEQUATE
POIKILOCYTES: ABNORMAL
POLYCHROMASIA: ABNORMAL
POTASSIUM REFLEX MAGNESIUM: 4.1 MEQ/L (ref 3.4–4.9)
QUANTI TB GOLD PLUS: NORMAL
QUANTI TB1 MINUS NIL: 0.01 IU/ML (ref 0–0.34)
QUANTI TB2 MINUS NIL: 0.01 IU/ML (ref 0–0.34)
QUANTIFERON MITOGEN: 0.07 IU/ML
QUANTIFERON NIL: 0.05 IU/ML
RBC # BLD: 3.48 M/UL (ref 4.2–5.4)
REASON FOR REJECTION: NORMAL
REJECTED TEST: NORMAL
SLIDE REVIEW: ABNORMAL
SODIUM BLD-SCNC: 137 MEQ/L (ref 135–144)
TEAR DROP CELLS: ABNORMAL
WBC # BLD: 2.5 K/UL (ref 4.8–10.8)

## 2019-02-14 PROCEDURE — 2580000003 HC RX 258: Performed by: INTERNAL MEDICINE

## 2019-02-14 PROCEDURE — 99232 SBSQ HOSP IP/OBS MODERATE 35: CPT | Performed by: INTERNAL MEDICINE

## 2019-02-14 PROCEDURE — 6360000002 HC RX W HCPCS: Performed by: INTERNAL MEDICINE

## 2019-02-14 PROCEDURE — 36415 COLL VENOUS BLD VENIPUNCTURE: CPT

## 2019-02-14 PROCEDURE — 85025 COMPLETE CBC W/AUTO DIFF WBC: CPT

## 2019-02-14 PROCEDURE — 80048 BASIC METABOLIC PNL TOTAL CA: CPT

## 2019-02-14 PROCEDURE — 1210000000 HC MED SURG R&B

## 2019-02-14 RX ADMIN — ACYCLOVIR SODIUM 270 MG: 50 INJECTION, SOLUTION INTRAVENOUS at 14:29

## 2019-02-14 RX ADMIN — Medication 10 ML: at 21:28

## 2019-02-14 RX ADMIN — ACYCLOVIR SODIUM 270 MG: 50 INJECTION, SOLUTION INTRAVENOUS at 21:28

## 2019-02-14 RX ADMIN — ACYCLOVIR SODIUM 270 MG: 50 INJECTION, SOLUTION INTRAVENOUS at 05:33

## 2019-02-14 ASSESSMENT — PAIN SCALES - GENERAL
PAINLEVEL_OUTOF10: 0
PAINLEVEL_OUTOF10: 0

## 2019-02-15 LAB
ANION GAP SERPL CALCULATED.3IONS-SCNC: 15 MEQ/L (ref 9–15)
BASOPHILS ABSOLUTE: 0 K/UL (ref 0–0.2)
BASOPHILS RELATIVE PERCENT: 1 %
BUN BLDV-MCNC: 9 MG/DL (ref 6–20)
CALCIUM SERPL-MCNC: 9.9 MG/DL (ref 8.5–9.9)
CHLORIDE BLD-SCNC: 103 MEQ/L (ref 95–107)
CO2: 21 MEQ/L (ref 20–31)
CREAT SERPL-MCNC: 0.44 MG/DL (ref 0.5–0.9)
EKG ATRIAL RATE: 123 BPM
EKG P AXIS: 77 DEGREES
EKG P-R INTERVAL: 134 MS
EKG Q-T INTERVAL: 316 MS
EKG QRS DURATION: 68 MS
EKG QTC CALCULATION (BAZETT): 452 MS
EKG R AXIS: 72 DEGREES
EKG T AXIS: 63 DEGREES
EKG VENTRICULAR RATE: 123 BPM
EOSINOPHILS ABSOLUTE: 0.1 K/UL (ref 0–0.7)
EOSINOPHILS RELATIVE PERCENT: 2.4 %
GFR AFRICAN AMERICAN: >60
GFR NON-AFRICAN AMERICAN: >60
GLUCOSE BLD-MCNC: 102 MG/DL (ref 70–99)
HCT VFR BLD CALC: 26 % (ref 37–47)
HEMOGLOBIN: 8.4 G/DL (ref 12–16)
LYMPHOCYTES ABSOLUTE: 1 K/UL (ref 1–4.8)
LYMPHOCYTES RELATIVE PERCENT: 31.5 %
MCH RBC QN AUTO: 25 PG (ref 27–31.3)
MCHC RBC AUTO-ENTMCNC: 32.1 % (ref 33–37)
MCV RBC AUTO: 78 FL (ref 82–100)
MONOCYTES ABSOLUTE: 0.4 K/UL (ref 0.2–0.8)
MONOCYTES RELATIVE PERCENT: 12 %
NEUTROPHILS ABSOLUTE: 1.8 K/UL (ref 1.4–6.5)
NEUTROPHILS RELATIVE PERCENT: 53.1 %
PDW BLD-RTO: 16.3 % (ref 11.5–14.5)
PLATELET # BLD: 219 K/UL (ref 130–400)
POTASSIUM REFLEX MAGNESIUM: 4.1 MEQ/L (ref 3.4–4.9)
RBC # BLD: 3.34 M/UL (ref 4.2–5.4)
SODIUM BLD-SCNC: 139 MEQ/L (ref 135–144)
WBC # BLD: 3.3 K/UL (ref 4.8–10.8)

## 2019-02-15 PROCEDURE — 2580000003 HC RX 258: Performed by: INTERNAL MEDICINE

## 2019-02-15 PROCEDURE — 6370000000 HC RX 637 (ALT 250 FOR IP): Performed by: PSYCHIATRY & NEUROLOGY

## 2019-02-15 PROCEDURE — 87591 N.GONORRHOEAE DNA AMP PROB: CPT

## 2019-02-15 PROCEDURE — 86317 IMMUNOASSAY INFECTIOUS AGENT: CPT

## 2019-02-15 PROCEDURE — 6360000002 HC RX W HCPCS: Performed by: PHYSICIAN ASSISTANT

## 2019-02-15 PROCEDURE — 85025 COMPLETE CBC W/AUTO DIFF WBC: CPT

## 2019-02-15 PROCEDURE — 86778 TOXOPLASMA ANTIBODY IGM: CPT

## 2019-02-15 PROCEDURE — 81247 G6PD GENE ALYS CMN VARIANT: CPT

## 2019-02-15 PROCEDURE — 86689 HTLV/HIV CONFIRMJ ANTIBODY: CPT

## 2019-02-15 PROCEDURE — 80048 BASIC METABOLIC PNL TOTAL CA: CPT

## 2019-02-15 PROCEDURE — 99232 SBSQ HOSP IP/OBS MODERATE 35: CPT | Performed by: INTERNAL MEDICINE

## 2019-02-15 PROCEDURE — 87536 HIV-1 QUANT&REVRSE TRNSCRPJ: CPT

## 2019-02-15 PROCEDURE — 6360000002 HC RX W HCPCS: Performed by: INTERNAL MEDICINE

## 2019-02-15 PROCEDURE — 93005 ELECTROCARDIOGRAM TRACING: CPT

## 2019-02-15 PROCEDURE — 87491 CHLMYD TRACH DNA AMP PROBE: CPT

## 2019-02-15 PROCEDURE — 009U3ZX DRAINAGE OF SPINAL CANAL, PERCUTANEOUS APPROACH, DIAGNOSTIC: ICD-10-PCS | Performed by: INTERNAL MEDICINE

## 2019-02-15 PROCEDURE — 87901 NFCT AGT GNTYP ALYS HIV1 REV: CPT

## 2019-02-15 PROCEDURE — 1210000000 HC MED SURG R&B

## 2019-02-15 PROCEDURE — 86361 T CELL ABSOLUTE COUNT: CPT

## 2019-02-15 PROCEDURE — 36415 COLL VENOUS BLD VENIPUNCTURE: CPT

## 2019-02-15 RX ORDER — PREDNISONE 20 MG/1
40 TABLET ORAL DAILY
Status: DISCONTINUED | OUTPATIENT
Start: 2019-02-15 | End: 2019-02-19

## 2019-02-15 RX ORDER — SODIUM CHLORIDE, SODIUM LACTATE, POTASSIUM CHLORIDE, CALCIUM CHLORIDE 600; 310; 30; 20 MG/100ML; MG/100ML; MG/100ML; MG/100ML
INJECTION, SOLUTION INTRAVENOUS CONTINUOUS
Status: DISCONTINUED | OUTPATIENT
Start: 2019-02-15 | End: 2019-02-19

## 2019-02-15 RX ORDER — SODIUM CHLORIDE, SODIUM LACTATE, POTASSIUM CHLORIDE, AND CALCIUM CHLORIDE .6; .31; .03; .02 G/100ML; G/100ML; G/100ML; G/100ML
1000 INJECTION, SOLUTION INTRAVENOUS ONCE
Status: COMPLETED | OUTPATIENT
Start: 2019-02-15 | End: 2019-02-15

## 2019-02-15 RX ORDER — PREDNISONE 20 MG/1
20 TABLET ORAL DAILY
Status: DISCONTINUED | OUTPATIENT
Start: 2019-02-22 | End: 2019-02-19

## 2019-02-15 RX ADMIN — SODIUM CHLORIDE, POTASSIUM CHLORIDE, SODIUM LACTATE AND CALCIUM CHLORIDE: 600; 310; 30; 20 INJECTION, SOLUTION INTRAVENOUS at 21:49

## 2019-02-15 RX ADMIN — ACYCLOVIR SODIUM 270 MG: 50 INJECTION, SOLUTION INTRAVENOUS at 04:59

## 2019-02-15 RX ADMIN — Medication 10 ML: at 21:48

## 2019-02-15 RX ADMIN — ACYCLOVIR SODIUM 270 MG: 50 INJECTION, SOLUTION INTRAVENOUS at 15:06

## 2019-02-15 RX ADMIN — SODIUM CHLORIDE, POTASSIUM CHLORIDE, SODIUM LACTATE AND CALCIUM CHLORIDE 1000 ML: 600; 310; 30; 20 INJECTION, SOLUTION INTRAVENOUS at 17:58

## 2019-02-15 RX ADMIN — ACYCLOVIR SODIUM 270 MG: 50 INJECTION, SOLUTION INTRAVENOUS at 21:48

## 2019-02-15 RX ADMIN — ENOXAPARIN SODIUM 40 MG: 40 INJECTION SUBCUTANEOUS at 08:18

## 2019-02-15 RX ADMIN — CITALOPRAM HYDROBROMIDE 10 MG: 10 TABLET ORAL at 08:18

## 2019-02-15 RX ADMIN — PREDNISONE 40 MG: 20 TABLET ORAL at 21:48

## 2019-02-16 LAB
ABSOLUTE CD 4 HELPER: 62 CELLS/UL (ref 430–1800)
ALBUMIN CSF: 22 MG/DL (ref 0–35)
ALBUMIN INDEX: 6.6 RATIO (ref 0–9)
ALBUMIN, SERUM BY NEPHELOMETRY: 3330 MG/DL (ref 3500–5200)
ANION GAP SERPL CALCULATED.3IONS-SCNC: 12 MEQ/L (ref 9–15)
BASOPHILS ABSOLUTE: 0 K/UL (ref 0–0.2)
BASOPHILS RELATIVE PERCENT: 0.8 %
BUN BLDV-MCNC: 8 MG/DL (ref 6–20)
CALCIUM SERPL-MCNC: 9.6 MG/DL (ref 8.5–9.9)
CHLORIDE BLD-SCNC: 105 MEQ/L (ref 95–107)
CO2: 22 MEQ/L (ref 20–31)
CREAT SERPL-MCNC: 0.38 MG/DL (ref 0.5–0.9)
EOSINOPHILS ABSOLUTE: 0 K/UL (ref 0–0.7)
EOSINOPHILS RELATIVE PERCENT: 0.1 %
GFR AFRICAN AMERICAN: >60
GFR NON-AFRICAN AMERICAN: >60
GLUCOSE BLD-MCNC: 153 MG/DL (ref 70–99)
HCT VFR BLD CALC: 24.3 % (ref 37–47)
HEMOGLOBIN: 7.8 G/DL (ref 12–16)
IGG CSF: 9.9 MG/DL (ref 0–6)
IGG INDEX: 0.84 RATIO (ref 0.28–0.66)
IGG SYNTHESIS RATE CSF: 16.7 MG/D
IGG/ALBUMIN CSF: 0.45 RATIO (ref 0.09–0.25)
IGG: 1780 MG/DL (ref 768–1632)
LYME WESTERN BLOT IGG CSF: NEGATIVE
LYME WESTERN BLOT IGM CSF: NEGATIVE
LYMPHOCYTES ABSOLUTE: 0.6 K/UL (ref 1–4.8)
LYMPHOCYTES RELATIVE PERCENT: 18.9 %
MCH RBC QN AUTO: 25.2 PG (ref 27–31.3)
MCHC RBC AUTO-ENTMCNC: 32.2 % (ref 33–37)
MCV RBC AUTO: 78.3 FL (ref 82–100)
MONOCYTES ABSOLUTE: 0.2 K/UL (ref 0.2–0.8)
MONOCYTES RELATIVE PERCENT: 5.6 %
MULTIPLE SCLEROSIS PANEL: ABNORMAL
NEUTROPHILS ABSOLUTE: 2.5 K/UL (ref 1.4–6.5)
NEUTROPHILS RELATIVE PERCENT: 74.6 %
OLIGOCLONAL BANDS CSF: POSITIVE
OLIGOCLONAL BANDS NUMBER: 3 BANDS (ref 0–1)
PDW BLD-RTO: 16.4 % (ref 11.5–14.5)
PLATELET # BLD: 208 K/UL (ref 130–400)
POTASSIUM REFLEX MAGNESIUM: 4.8 MEQ/L (ref 3.4–4.9)
RBC # BLD: 3.1 M/UL (ref 4.2–5.4)
SODIUM BLD-SCNC: 139 MEQ/L (ref 135–144)
VDRL CSF SCREEN: NON REACTIVE
WBC # BLD: 3.3 K/UL (ref 4.8–10.8)
WEST NILE IGG, CSF: 0.88 IV
WEST NILE IGM ANTIBODY CSF: 0 IV

## 2019-02-16 PROCEDURE — 1210000000 HC MED SURG R&B

## 2019-02-16 PROCEDURE — 99233 SBSQ HOSP IP/OBS HIGH 50: CPT | Performed by: INTERNAL MEDICINE

## 2019-02-16 PROCEDURE — 80048 BASIC METABOLIC PNL TOTAL CA: CPT

## 2019-02-16 PROCEDURE — 6370000000 HC RX 637 (ALT 250 FOR IP): Performed by: PSYCHIATRY & NEUROLOGY

## 2019-02-16 PROCEDURE — 86361 T CELL ABSOLUTE COUNT: CPT

## 2019-02-16 PROCEDURE — 6370000000 HC RX 637 (ALT 250 FOR IP): Performed by: INTERNAL MEDICINE

## 2019-02-16 PROCEDURE — 6360000002 HC RX W HCPCS: Performed by: INTERNAL MEDICINE

## 2019-02-16 PROCEDURE — 2580000003 HC RX 258: Performed by: INTERNAL MEDICINE

## 2019-02-16 PROCEDURE — 85025 COMPLETE CBC W/AUTO DIFF WBC: CPT

## 2019-02-16 PROCEDURE — 6360000002 HC RX W HCPCS: Performed by: PHYSICIAN ASSISTANT

## 2019-02-16 PROCEDURE — 36415 COLL VENOUS BLD VENIPUNCTURE: CPT

## 2019-02-16 RX ORDER — FAMOTIDINE 20 MG/1
20 TABLET, FILM COATED ORAL 2 TIMES DAILY
Status: DISCONTINUED | OUTPATIENT
Start: 2019-02-16 | End: 2019-02-19 | Stop reason: HOSPADM

## 2019-02-16 RX ORDER — THIAMINE MONONITRATE (VIT B1) 100 MG
100 TABLET ORAL DAILY
Status: DISCONTINUED | OUTPATIENT
Start: 2019-02-16 | End: 2019-02-19 | Stop reason: HOSPADM

## 2019-02-16 RX ORDER — VITAMIN B COMPLEX
1 CAPSULE ORAL DAILY
Status: DISCONTINUED | OUTPATIENT
Start: 2019-02-16 | End: 2019-02-19 | Stop reason: HOSPADM

## 2019-02-16 RX ORDER — FOLIC ACID 1 MG/1
1 TABLET ORAL DAILY
Status: DISCONTINUED | OUTPATIENT
Start: 2019-02-16 | End: 2019-02-19 | Stop reason: HOSPADM

## 2019-02-16 RX ADMIN — ACYCLOVIR SODIUM 270 MG: 50 INJECTION, SOLUTION INTRAVENOUS at 06:18

## 2019-02-16 RX ADMIN — ACYCLOVIR SODIUM 270 MG: 50 INJECTION, SOLUTION INTRAVENOUS at 14:17

## 2019-02-16 RX ADMIN — Medication 10 ML: at 22:25

## 2019-02-16 RX ADMIN — CITALOPRAM HYDROBROMIDE 10 MG: 10 TABLET ORAL at 08:35

## 2019-02-16 RX ADMIN — FAMOTIDINE 20 MG: 20 TABLET, FILM COATED ORAL at 21:42

## 2019-02-16 RX ADMIN — SODIUM CHLORIDE, POTASSIUM CHLORIDE, SODIUM LACTATE AND CALCIUM CHLORIDE: 600; 310; 30; 20 INJECTION, SOLUTION INTRAVENOUS at 06:19

## 2019-02-16 RX ADMIN — ACYCLOVIR SODIUM 270 MG: 50 INJECTION, SOLUTION INTRAVENOUS at 22:13

## 2019-02-16 RX ADMIN — ENOXAPARIN SODIUM 40 MG: 40 INJECTION SUBCUTANEOUS at 08:35

## 2019-02-16 RX ADMIN — PREDNISONE 40 MG: 20 TABLET ORAL at 08:35

## 2019-02-16 ASSESSMENT — PAIN DESCRIPTION - DESCRIPTORS: DESCRIPTORS: ACHING;BURNING;DISCOMFORT

## 2019-02-16 ASSESSMENT — PAIN DESCRIPTION - PAIN TYPE: TYPE: ACUTE PAIN

## 2019-02-16 ASSESSMENT — PAIN DESCRIPTION - LOCATION: LOCATION: ARM

## 2019-02-16 ASSESSMENT — PAIN SCALES - GENERAL
PAINLEVEL_OUTOF10: 0
PAINLEVEL_OUTOF10: 4

## 2019-02-17 PROBLEM — F06.31 DEPRESSION DUE TO PHYSICAL ILLNESS: Status: ACTIVE | Noted: 2019-02-17

## 2019-02-17 LAB
ANION GAP SERPL CALCULATED.3IONS-SCNC: 11 MEQ/L (ref 9–15)
BASOPHILS ABSOLUTE: 0 K/UL (ref 0–0.2)
BASOPHILS RELATIVE PERCENT: 0.6 %
BUN BLDV-MCNC: 9 MG/DL (ref 6–20)
CALCIUM SERPL-MCNC: 9.1 MG/DL (ref 8.5–9.9)
CHLORIDE BLD-SCNC: 107 MEQ/L (ref 95–107)
CO2: 23 MEQ/L (ref 20–31)
CREAT SERPL-MCNC: 0.4 MG/DL (ref 0.5–0.9)
CSF CULTURE: NORMAL
EOSINOPHILS ABSOLUTE: 0 K/UL (ref 0–0.7)
EOSINOPHILS RELATIVE PERCENT: 0.2 %
GFR AFRICAN AMERICAN: >60
GFR NON-AFRICAN AMERICAN: >60
GLUCOSE BLD-MCNC: 96 MG/DL (ref 70–99)
GRAM STAIN RESULT: NORMAL
HCT VFR BLD CALC: 22.5 % (ref 37–47)
HEMOGLOBIN: 7.2 G/DL (ref 12–16)
HERPES SIMPLEX VIRUS BY PCR: NOT DETECTED
HIV-1 WESTERN BLOT: POSITIVE
HSV SOURCE: NORMAL
LYMPHOCYTES ABSOLUTE: 0.9 K/UL (ref 1–4.8)
LYMPHOCYTES RELATIVE PERCENT: 29.4 %
MCH RBC QN AUTO: 25.4 PG (ref 27–31.3)
MCHC RBC AUTO-ENTMCNC: 32 % (ref 33–37)
MCV RBC AUTO: 79.4 FL (ref 82–100)
MONOCYTES ABSOLUTE: 0.5 K/UL (ref 0.2–0.8)
MONOCYTES RELATIVE PERCENT: 14.3 %
MYELIN BASIC PROTEIN, CSF: 6.69 NG/ML (ref 0–5.5)
NEUTROPHILS ABSOLUTE: 1.8 K/UL (ref 1.4–6.5)
NEUTROPHILS RELATIVE PERCENT: 55.5 %
PDW BLD-RTO: 16.7 % (ref 11.5–14.5)
PLATELET # BLD: 198 K/UL (ref 130–400)
POTASSIUM REFLEX MAGNESIUM: 3.9 MEQ/L (ref 3.4–4.9)
RBC # BLD: 2.83 M/UL (ref 4.2–5.4)
SODIUM BLD-SCNC: 141 MEQ/L (ref 135–144)
TOXOPLASMA GONDI AB IGM: 3.2 AU/ML
TOXOPLASMA GONDII AB IGG: 11 IU/ML
WBC # BLD: 3.2 K/UL (ref 4.8–10.8)

## 2019-02-17 PROCEDURE — 6360000002 HC RX W HCPCS: Performed by: INTERNAL MEDICINE

## 2019-02-17 PROCEDURE — 85025 COMPLETE CBC W/AUTO DIFF WBC: CPT

## 2019-02-17 PROCEDURE — 1210000000 HC MED SURG R&B

## 2019-02-17 PROCEDURE — 6370000000 HC RX 637 (ALT 250 FOR IP): Performed by: PSYCHIATRY & NEUROLOGY

## 2019-02-17 PROCEDURE — 2580000003 HC RX 258: Performed by: INTERNAL MEDICINE

## 2019-02-17 PROCEDURE — 80048 BASIC METABOLIC PNL TOTAL CA: CPT

## 2019-02-17 PROCEDURE — 6370000000 HC RX 637 (ALT 250 FOR IP): Performed by: INTERNAL MEDICINE

## 2019-02-17 PROCEDURE — 36415 COLL VENOUS BLD VENIPUNCTURE: CPT

## 2019-02-17 PROCEDURE — 6360000002 HC RX W HCPCS: Performed by: PHYSICIAN ASSISTANT

## 2019-02-17 PROCEDURE — 2580000003 HC RX 258: Performed by: PHYSICIAN ASSISTANT

## 2019-02-17 RX ADMIN — IRON SUCROSE 100 MG: 20 INJECTION, SOLUTION INTRAVENOUS at 16:58

## 2019-02-17 RX ADMIN — ACYCLOVIR SODIUM 270 MG: 50 INJECTION, SOLUTION INTRAVENOUS at 06:29

## 2019-02-17 RX ADMIN — SODIUM CHLORIDE, POTASSIUM CHLORIDE, SODIUM LACTATE AND CALCIUM CHLORIDE: 600; 310; 30; 20 INJECTION, SOLUTION INTRAVENOUS at 16:58

## 2019-02-17 RX ADMIN — ACYCLOVIR SODIUM 270 MG: 50 INJECTION, SOLUTION INTRAVENOUS at 21:45

## 2019-02-17 RX ADMIN — Medication 100 MG: at 08:17

## 2019-02-17 RX ADMIN — SODIUM CHLORIDE, POTASSIUM CHLORIDE, SODIUM LACTATE AND CALCIUM CHLORIDE: 600; 310; 30; 20 INJECTION, SOLUTION INTRAVENOUS at 03:49

## 2019-02-17 RX ADMIN — SODIUM CHLORIDE, POTASSIUM CHLORIDE, SODIUM LACTATE AND CALCIUM CHLORIDE: 600; 310; 30; 20 INJECTION, SOLUTION INTRAVENOUS at 21:45

## 2019-02-17 RX ADMIN — Medication 1 CAPSULE: at 08:17

## 2019-02-17 RX ADMIN — PREDNISONE 40 MG: 20 TABLET ORAL at 08:18

## 2019-02-17 RX ADMIN — CITALOPRAM HYDROBROMIDE 10 MG: 10 TABLET ORAL at 08:18

## 2019-02-17 RX ADMIN — ACYCLOVIR SODIUM 270 MG: 50 INJECTION, SOLUTION INTRAVENOUS at 13:42

## 2019-02-17 RX ADMIN — FAMOTIDINE 20 MG: 20 TABLET, FILM COATED ORAL at 08:18

## 2019-02-17 RX ADMIN — Medication 10 ML: at 21:12

## 2019-02-17 RX ADMIN — FOLIC ACID 1 MG: 1 TABLET ORAL at 08:17

## 2019-02-17 RX ADMIN — FAMOTIDINE 20 MG: 20 TABLET, FILM COATED ORAL at 21:10

## 2019-02-17 RX ADMIN — ENOXAPARIN SODIUM 40 MG: 40 INJECTION SUBCUTANEOUS at 08:17

## 2019-02-17 ASSESSMENT — PAIN SCALES - GENERAL: PAINLEVEL_OUTOF10: 0

## 2019-02-18 PROBLEM — E44.0 MODERATE MALNUTRITION (HCC): Status: ACTIVE | Noted: 2019-02-18

## 2019-02-18 LAB
ABO/RH: NORMAL
ABSOLUTE CD 4 HELPER: 37 CELLS/UL (ref 430–1800)
ANION GAP SERPL CALCULATED.3IONS-SCNC: 11 MEQ/L (ref 9–15)
ANISOCYTOSIS: ABNORMAL
ANTIBODY SCREEN: NORMAL
BASOPHILS ABSOLUTE: 0 K/UL (ref 0–0.2)
BASOPHILS RELATIVE PERCENT: 0.4 %
BLOOD BANK DISPENSE STATUS: NORMAL
BLOOD BANK PRODUCT CODE: NORMAL
BPU ID: NORMAL
BUN BLDV-MCNC: 8 MG/DL (ref 6–20)
CALCIUM SERPL-MCNC: 8.9 MG/DL (ref 8.5–9.9)
CD4 % HELPER T CELL: 4 % (ref 32–64)
CHLORIDE BLD-SCNC: 107 MEQ/L (ref 95–107)
CO2: 23 MEQ/L (ref 20–31)
CREAT SERPL-MCNC: 0.37 MG/DL (ref 0.5–0.9)
DESCRIPTION BLOOD BANK: NORMAL
EOSINOPHILS ABSOLUTE: 0 K/UL (ref 0–0.7)
EOSINOPHILS RELATIVE PERCENT: 0 %
GFR AFRICAN AMERICAN: >60
GFR NON-AFRICAN AMERICAN: >60
GLUCOSE BLD-MCNC: 89 MG/DL (ref 70–99)
GLUCOSE BLD-MCNC: 99 MG/DL (ref 60–115)
HCT VFR BLD CALC: 21.5 % (ref 37–47)
HEMOGLOBIN: 6.9 G/DL (ref 12–16)
HIV-1 QNT LOG, IU/ML: 5.8 LOG CPY/ML
HIV-1 QNT, IU/ML: ABNORMAL
HYPOCHROMIA: ABNORMAL
INTERPRETATION: DETECTED
LYMPHOCYTE SUBSET PANEL 2 INFO: ABNORMAL
LYMPHOCYTES ABSOLUTE: 0.6 K/UL (ref 1–4.8)
LYMPHOCYTES RELATIVE PERCENT: 16 %
MACROCYTES: 0
MCH RBC QN AUTO: 25.5 PG (ref 27–31.3)
MCHC RBC AUTO-ENTMCNC: 32.2 % (ref 33–37)
MCV RBC AUTO: 79.2 FL (ref 82–100)
MICROCYTES: ABNORMAL
MONOCYTES ABSOLUTE: 0.2 K/UL (ref 0.2–0.8)
MONOCYTES RELATIVE PERCENT: 6.4 %
NEUTROPHILS ABSOLUTE: 3 K/UL (ref 1.4–6.5)
NEUTROPHILS RELATIVE PERCENT: 77 %
NUCLEATED RED BLOOD CELLS: 2 /100 WBC
OVALOCYTES: ABNORMAL
PDW BLD-RTO: 17.8 % (ref 11.5–14.5)
PERFORMED ON: NORMAL
PLATELET # BLD: 197 K/UL (ref 130–400)
PLATELET SLIDE REVIEW: ADEQUATE
POIKILOCYTES: ABNORMAL
POLYCHROMASIA: ABNORMAL
POTASSIUM REFLEX MAGNESIUM: 3.6 MEQ/L (ref 3.4–4.9)
RBC # BLD: 2.71 M/UL (ref 4.2–5.4)
SODIUM BLD-SCNC: 141 MEQ/L (ref 135–144)
WBC # BLD: 3.9 K/UL (ref 4.8–10.8)

## 2019-02-18 PROCEDURE — 80048 BASIC METABOLIC PNL TOTAL CA: CPT

## 2019-02-18 PROCEDURE — 2580000003 HC RX 258: Performed by: PHYSICIAN ASSISTANT

## 2019-02-18 PROCEDURE — 36415 COLL VENOUS BLD VENIPUNCTURE: CPT

## 2019-02-18 PROCEDURE — 6360000002 HC RX W HCPCS: Performed by: INTERNAL MEDICINE

## 2019-02-18 PROCEDURE — 6370000000 HC RX 637 (ALT 250 FOR IP): Performed by: INTERNAL MEDICINE

## 2019-02-18 PROCEDURE — 2580000003 HC RX 258: Performed by: INTERNAL MEDICINE

## 2019-02-18 PROCEDURE — 85025 COMPLETE CBC W/AUTO DIFF WBC: CPT

## 2019-02-18 PROCEDURE — 6360000002 HC RX W HCPCS: Performed by: PHYSICIAN ASSISTANT

## 2019-02-18 PROCEDURE — 86900 BLOOD TYPING SEROLOGIC ABO: CPT

## 2019-02-18 PROCEDURE — 86850 RBC ANTIBODY SCREEN: CPT

## 2019-02-18 PROCEDURE — 36430 TRANSFUSION BLD/BLD COMPNT: CPT

## 2019-02-18 PROCEDURE — 1210000000 HC MED SURG R&B

## 2019-02-18 PROCEDURE — 6370000000 HC RX 637 (ALT 250 FOR IP): Performed by: PSYCHIATRY & NEUROLOGY

## 2019-02-18 PROCEDURE — 99232 SBSQ HOSP IP/OBS MODERATE 35: CPT | Performed by: INTERNAL MEDICINE

## 2019-02-18 PROCEDURE — 93010 ELECTROCARDIOGRAM REPORT: CPT | Performed by: INTERNAL MEDICINE

## 2019-02-18 PROCEDURE — P9016 RBC LEUKOCYTES REDUCED: HCPCS

## 2019-02-18 PROCEDURE — 86901 BLOOD TYPING SEROLOGIC RH(D): CPT

## 2019-02-18 PROCEDURE — 99233 SBSQ HOSP IP/OBS HIGH 50: CPT | Performed by: RADIOLOGY

## 2019-02-18 PROCEDURE — 86923 COMPATIBILITY TEST ELECTRIC: CPT

## 2019-02-18 RX ORDER — PENTAMIDINE ISETHIONATE 300 MG/300MG
300 INHALANT RESPIRATORY (INHALATION) ONCE
Status: DISCONTINUED | OUTPATIENT
Start: 2019-02-18 | End: 2019-02-19 | Stop reason: HOSPADM

## 2019-02-18 RX ORDER — 0.9 % SODIUM CHLORIDE 0.9 %
250 INTRAVENOUS SOLUTION INTRAVENOUS ONCE
Status: DISCONTINUED | OUTPATIENT
Start: 2019-02-18 | End: 2019-02-19 | Stop reason: HOSPADM

## 2019-02-18 RX ADMIN — FAMOTIDINE 20 MG: 20 TABLET, FILM COATED ORAL at 20:57

## 2019-02-18 RX ADMIN — Medication 10 ML: at 20:58

## 2019-02-18 RX ADMIN — AZITHROMYCIN MONOHYDRATE 1250 MG: 500 TABLET ORAL at 12:41

## 2019-02-18 RX ADMIN — FAMOTIDINE 20 MG: 20 TABLET, FILM COATED ORAL at 09:19

## 2019-02-18 RX ADMIN — ENOXAPARIN SODIUM 40 MG: 40 INJECTION SUBCUTANEOUS at 09:20

## 2019-02-18 RX ADMIN — Medication 1 CAPSULE: at 09:19

## 2019-02-18 RX ADMIN — ACYCLOVIR SODIUM 270 MG: 50 INJECTION, SOLUTION INTRAVENOUS at 06:59

## 2019-02-18 RX ADMIN — SODIUM CHLORIDE, POTASSIUM CHLORIDE, SODIUM LACTATE AND CALCIUM CHLORIDE: 600; 310; 30; 20 INJECTION, SOLUTION INTRAVENOUS at 20:59

## 2019-02-18 RX ADMIN — CITALOPRAM HYDROBROMIDE 10 MG: 10 TABLET ORAL at 09:19

## 2019-02-18 RX ADMIN — SODIUM CHLORIDE, POTASSIUM CHLORIDE, SODIUM LACTATE AND CALCIUM CHLORIDE: 600; 310; 30; 20 INJECTION, SOLUTION INTRAVENOUS at 13:26

## 2019-02-18 RX ADMIN — FOLIC ACID 1 MG: 1 TABLET ORAL at 09:19

## 2019-02-18 RX ADMIN — Medication 100 MG: at 09:19

## 2019-02-18 RX ADMIN — PREDNISONE 40 MG: 20 TABLET ORAL at 09:30

## 2019-02-18 ASSESSMENT — PAIN SCALES - GENERAL
PAINLEVEL_OUTOF10: 0

## 2019-02-19 ENCOUNTER — TELEPHONE (OUTPATIENT)
Dept: INFECTIOUS DISEASES | Age: 44
End: 2019-02-19

## 2019-02-19 VITALS
SYSTOLIC BLOOD PRESSURE: 118 MMHG | HEART RATE: 80 BPM | DIASTOLIC BLOOD PRESSURE: 80 MMHG | HEIGHT: 64 IN | BODY MASS INDEX: 19.38 KG/M2 | WEIGHT: 113.54 LBS | TEMPERATURE: 98.4 F | OXYGEN SATURATION: 100 % | RESPIRATION RATE: 19 BRPM

## 2019-02-19 LAB
ANION GAP SERPL CALCULATED.3IONS-SCNC: 12 MEQ/L (ref 9–15)
BASOPHILS ABSOLUTE: 0 K/UL (ref 0–0.2)
BASOPHILS RELATIVE PERCENT: 0.5 %
BUN BLDV-MCNC: 10 MG/DL (ref 6–20)
CALCIUM SERPL-MCNC: 8.3 MG/DL (ref 8.5–9.9)
CHLORIDE BLD-SCNC: 109 MEQ/L (ref 95–107)
CO2: 23 MEQ/L (ref 20–31)
CREAT SERPL-MCNC: 0.43 MG/DL (ref 0.5–0.9)
EOSINOPHILS ABSOLUTE: 0 K/UL (ref 0–0.7)
EOSINOPHILS RELATIVE PERCENT: 0.2 %
GFR AFRICAN AMERICAN: >60
GFR NON-AFRICAN AMERICAN: >60
GLUCOSE BLD-MCNC: 91 MG/DL (ref 70–99)
HCT VFR BLD CALC: 27.3 % (ref 37–47)
HEMOGLOBIN: 8.9 G/DL (ref 12–16)
LYMPHOCYTES ABSOLUTE: 1.1 K/UL (ref 1–4.8)
LYMPHOCYTES RELATIVE PERCENT: 24.1 %
MCH RBC QN AUTO: 26.8 PG (ref 27–31.3)
MCHC RBC AUTO-ENTMCNC: 32.7 % (ref 33–37)
MCV RBC AUTO: 82 FL (ref 82–100)
MONOCYTES ABSOLUTE: 0.4 K/UL (ref 0.2–0.8)
MONOCYTES RELATIVE PERCENT: 9 %
NEUTROPHILS ABSOLUTE: 3 K/UL (ref 1.4–6.5)
NEUTROPHILS RELATIVE PERCENT: 66.2 %
PDW BLD-RTO: 20 % (ref 11.5–14.5)
PLATELET # BLD: 200 K/UL (ref 130–400)
POTASSIUM REFLEX MAGNESIUM: 4.3 MEQ/L (ref 3.4–4.9)
RBC # BLD: 3.32 M/UL (ref 4.2–5.4)
SODIUM BLD-SCNC: 144 MEQ/L (ref 135–144)
WBC # BLD: 4.5 K/UL (ref 4.8–10.8)

## 2019-02-19 PROCEDURE — 36415 COLL VENOUS BLD VENIPUNCTURE: CPT

## 2019-02-19 PROCEDURE — 6370000000 HC RX 637 (ALT 250 FOR IP): Performed by: INTERNAL MEDICINE

## 2019-02-19 PROCEDURE — 6360000002 HC RX W HCPCS: Performed by: PHYSICIAN ASSISTANT

## 2019-02-19 PROCEDURE — 6370000000 HC RX 637 (ALT 250 FOR IP): Performed by: PSYCHIATRY & NEUROLOGY

## 2019-02-19 PROCEDURE — 85025 COMPLETE CBC W/AUTO DIFF WBC: CPT

## 2019-02-19 PROCEDURE — 80048 BASIC METABOLIC PNL TOTAL CA: CPT

## 2019-02-19 RX ORDER — LANOLIN ALCOHOL/MO/W.PET/CERES
100 CREAM (GRAM) TOPICAL DAILY
Qty: 30 TABLET | Refills: 3 | Status: SHIPPED | OUTPATIENT
Start: 2019-02-20 | End: 2019-12-05 | Stop reason: ALTCHOICE

## 2019-02-19 RX ORDER — FOLIC ACID 1 MG/1
1 TABLET ORAL DAILY
Qty: 30 TABLET | Refills: 3 | Status: SHIPPED | OUTPATIENT
Start: 2019-02-20 | End: 2019-12-05 | Stop reason: ALTCHOICE

## 2019-02-19 RX ORDER — FERROUS SULFATE 325(65) MG
325 TABLET ORAL
Qty: 30 TABLET | Refills: 11 | Status: SHIPPED | OUTPATIENT
Start: 2019-02-19 | End: 2019-12-05 | Stop reason: ALTCHOICE

## 2019-02-19 RX ORDER — VITAMIN B COMPLEX
1 CAPSULE ORAL DAILY
Qty: 30 CAPSULE | Refills: 3 | Status: SHIPPED | OUTPATIENT
Start: 2019-02-20 | End: 2019-12-05 | Stop reason: ALTCHOICE

## 2019-02-19 RX ORDER — CITALOPRAM 10 MG/1
10 TABLET ORAL DAILY
Qty: 30 TABLET | Refills: 3 | Status: SHIPPED | OUTPATIENT
Start: 2019-02-20 | End: 2020-02-10 | Stop reason: SDUPTHER

## 2019-02-19 RX ADMIN — FAMOTIDINE 20 MG: 20 TABLET, FILM COATED ORAL at 07:59

## 2019-02-19 RX ADMIN — FOLIC ACID 1 MG: 1 TABLET ORAL at 07:59

## 2019-02-19 RX ADMIN — Medication 1 CAPSULE: at 07:59

## 2019-02-19 RX ADMIN — Medication 100 MG: at 07:59

## 2019-02-19 RX ADMIN — PREDNISONE 40 MG: 20 TABLET ORAL at 07:59

## 2019-02-19 RX ADMIN — ENOXAPARIN SODIUM 40 MG: 40 INJECTION SUBCUTANEOUS at 07:58

## 2019-02-19 RX ADMIN — CITALOPRAM HYDROBROMIDE 10 MG: 10 TABLET ORAL at 07:59

## 2019-02-19 ASSESSMENT — ENCOUNTER SYMPTOMS
CONSTIPATION: 0
COUGH: 0
NAUSEA: 0
EYES NEGATIVE: 1
WHEEZING: 0
RESPIRATORY NEGATIVE: 1
SHORTNESS OF BREATH: 0
BACK PAIN: 0
VOMITING: 0
DIARRHEA: 0
ABDOMINAL PAIN: 0
GASTROINTESTINAL NEGATIVE: 1
PHOTOPHOBIA: 0

## 2019-02-19 ASSESSMENT — PAIN SCALES - GENERAL: PAINLEVEL_OUTOF10: 0

## 2019-02-20 LAB
G6PD ALLELE 1: NEGATIVE
G6PD ALLELE 2: NEGATIVE
G6PD MUTATION INTERPRETATION: NORMAL
G6PD SPECIMEN: NORMAL

## 2019-02-21 LAB
C. TRACHOMATIS DNA ,URINE: NEGATIVE
EER HIV GENOTYPING: NORMAL
HIV-1 GENOTYPE: NORMAL
N. GONORRHOEAE DNA, URINE: NEGATIVE

## 2019-02-22 ENCOUNTER — OFFICE VISIT (OUTPATIENT)
Dept: INFECTIOUS DISEASES | Age: 44
End: 2019-02-22
Payer: COMMERCIAL

## 2019-02-22 VITALS
DIASTOLIC BLOOD PRESSURE: 64 MMHG | RESPIRATION RATE: 20 BRPM | OXYGEN SATURATION: 100 % | WEIGHT: 109.2 LBS | HEIGHT: 64 IN | BODY MASS INDEX: 18.64 KG/M2 | HEART RATE: 90 BPM | SYSTOLIC BLOOD PRESSURE: 95 MMHG | TEMPERATURE: 97.8 F

## 2019-02-22 DIAGNOSIS — Z21 NEWLY DIAGNOSED HIV-POSITIVE (HCC): ICD-10-CM

## 2019-02-22 DIAGNOSIS — B20 AIDS (ACQUIRED IMMUNE DEFICIENCY SYNDROME) (HCC): Primary | ICD-10-CM

## 2019-02-22 DIAGNOSIS — F91.9 BEHAVIOR DISTURBANCE: ICD-10-CM

## 2019-02-22 DIAGNOSIS — G93.40 ENCEPHALOPATHY: ICD-10-CM

## 2019-02-22 DIAGNOSIS — R32 URINARY INCONTINENCE, UNSPECIFIED TYPE: ICD-10-CM

## 2019-02-22 DIAGNOSIS — R45.89 FLAT AFFECT: ICD-10-CM

## 2019-02-22 PROCEDURE — 1036F TOBACCO NON-USER: CPT | Performed by: INTERNAL MEDICINE

## 2019-02-22 PROCEDURE — G8427 DOCREV CUR MEDS BY ELIG CLIN: HCPCS | Performed by: INTERNAL MEDICINE

## 2019-02-22 PROCEDURE — 99215 OFFICE O/P EST HI 40 MIN: CPT | Performed by: INTERNAL MEDICINE

## 2019-02-22 PROCEDURE — G8484 FLU IMMUNIZE NO ADMIN: HCPCS | Performed by: INTERNAL MEDICINE

## 2019-02-22 PROCEDURE — G8420 CALC BMI NORM PARAMETERS: HCPCS | Performed by: INTERNAL MEDICINE

## 2019-02-22 ASSESSMENT — PATIENT HEALTH QUESTIONNAIRE - PHQ9
2. FEELING DOWN, DEPRESSED OR HOPELESS: 0
SUM OF ALL RESPONSES TO PHQ QUESTIONS 1-9: 1
SUM OF ALL RESPONSES TO PHQ9 QUESTIONS 1 & 2: 1
1. LITTLE INTEREST OR PLEASURE IN DOING THINGS: 1
SUM OF ALL RESPONSES TO PHQ QUESTIONS 1-9: 1

## 2019-02-25 DIAGNOSIS — B20 HIV (HUMAN IMMUNODEFICIENCY VIRUS INFECTION) (HCC): ICD-10-CM

## 2019-02-25 PROBLEM — Z21 HIV (HUMAN IMMUNODEFICIENCY VIRUS INFECTION) (HCC): Status: ACTIVE | Noted: 2019-02-25

## 2019-02-27 ENCOUNTER — TELEPHONE (OUTPATIENT)
Dept: INFECTIOUS DISEASES | Age: 44
End: 2019-02-27

## 2019-02-28 ENCOUNTER — TELEPHONE (OUTPATIENT)
Dept: INFECTIOUS DISEASES | Age: 44
End: 2019-02-28

## 2019-03-05 ENCOUNTER — TELEPHONE (OUTPATIENT)
Dept: INFECTIOUS DISEASES | Age: 44
End: 2019-03-05

## 2019-03-13 ENCOUNTER — TELEPHONE (OUTPATIENT)
Dept: INFECTIOUS DISEASES | Age: 44
End: 2019-03-13

## 2019-03-14 RX ORDER — ELVITEGRAVIR, COBICISTAT, EMTRICITABINE, AND TENOFOVIR ALAFENAMIDE 150; 150; 200; 10 MG/1; MG/1; MG/1; MG/1
TABLET ORAL
Refills: 2 | COMMUNITY
Start: 2019-02-23 | End: 2022-03-09 | Stop reason: SDUPTHER

## 2019-03-14 RX ORDER — FLUCONAZOLE 200 MG/1
TABLET ORAL
Refills: 0 | COMMUNITY
Start: 2019-02-23 | End: 2019-03-14

## 2019-03-14 RX ORDER — DAPSONE 100 MG/1
TABLET ORAL
Refills: 0 | COMMUNITY
Start: 2019-03-05 | End: 2019-07-01

## 2019-03-16 LAB
FINAL REPORT: NORMAL
PRELIMINARY: NORMAL

## 2019-03-20 ENCOUNTER — TELEPHONE (OUTPATIENT)
Dept: INFECTIOUS DISEASES | Age: 44
End: 2019-03-20

## 2019-03-27 ENCOUNTER — TELEPHONE (OUTPATIENT)
Dept: INFECTIOUS DISEASES | Age: 44
End: 2019-03-27

## 2019-04-10 ENCOUNTER — NURSE ONLY (OUTPATIENT)
Dept: INFECTIOUS DISEASES | Age: 44
End: 2019-04-10

## 2019-04-10 DIAGNOSIS — B20 HIV (HUMAN IMMUNODEFICIENCY VIRUS INFECTION) (HCC): ICD-10-CM

## 2019-04-10 DIAGNOSIS — B20 HUMAN IMMUNODEFICIENCY VIRUS (HCC): Primary | ICD-10-CM

## 2019-04-10 DIAGNOSIS — B20 AIDS (ACQUIRED IMMUNE DEFICIENCY SYNDROME) (HCC): ICD-10-CM

## 2019-04-10 LAB
ALBUMIN SERPL-MCNC: 4 G/DL (ref 3.5–4.6)
ALP BLD-CCNC: 46 U/L (ref 40–130)
ALT SERPL-CCNC: 10 U/L (ref 0–33)
ANION GAP SERPL CALCULATED.3IONS-SCNC: 16 MEQ/L (ref 9–15)
AST SERPL-CCNC: 18 U/L (ref 0–35)
BACTERIA: ABNORMAL /HPF
BILIRUB SERPL-MCNC: 1.1 MG/DL (ref 0.2–0.7)
BILIRUBIN URINE: NEGATIVE
BLOOD, URINE: ABNORMAL
BUN BLDV-MCNC: 5 MG/DL (ref 6–20)
CALCIUM SERPL-MCNC: 9.6 MG/DL (ref 8.5–9.9)
CHLORIDE BLD-SCNC: 100 MEQ/L (ref 95–107)
CLARITY: ABNORMAL
CO2: 22 MEQ/L (ref 20–31)
COLOR: ABNORMAL
CREAT SERPL-MCNC: 0.51 MG/DL (ref 0.5–0.9)
EPITHELIAL CELLS, UA: ABNORMAL /HPF (ref 0–5)
GFR AFRICAN AMERICAN: >60
GFR NON-AFRICAN AMERICAN: >60
GLOBULIN: 3.9 G/DL (ref 2.3–3.5)
GLUCOSE BLD-MCNC: 64 MG/DL (ref 70–99)
GLUCOSE URINE: NEGATIVE MG/DL
HBV SURFACE AB TITR SER: NORMAL MIU/ML
HCT VFR BLD CALC: 34.4 % (ref 37–47)
HEMOGLOBIN: 11.3 G/DL (ref 12–16)
HEPATITIS B CORE IGM ANTIBODY: NORMAL
HEPATITIS B SURFACE ANTIGEN INTERPRETATION: NORMAL
HEPATITIS C ANTIBODY INTERPRETATION: NORMAL
HYALINE CASTS: ABNORMAL /HPF (ref 0–5)
KETONES, URINE: NEGATIVE MG/DL
LEUKOCYTE ESTERASE, URINE: NEGATIVE
MCH RBC QN AUTO: 28.9 PG (ref 27–31.3)
MCHC RBC AUTO-ENTMCNC: 32.8 % (ref 33–37)
MCV RBC AUTO: 88.4 FL (ref 82–100)
NITRITE, URINE: NEGATIVE
PDW BLD-RTO: 23.1 % (ref 11.5–14.5)
PH UA: 7.5 (ref 5–9)
PLATELET # BLD: 283 K/UL (ref 130–400)
POTASSIUM SERPL-SCNC: 3.7 MEQ/L (ref 3.4–4.9)
PROTEIN UA: NEGATIVE MG/DL
RBC # BLD: 3.89 M/UL (ref 4.2–5.4)
RBC UA: >100 /HPF (ref 0–5)
SODIUM BLD-SCNC: 138 MEQ/L (ref 135–144)
SPECIFIC GRAVITY UA: 1.01 (ref 1–1.03)
TOTAL PROTEIN: 7.9 G/DL (ref 6.3–8)
URINE REFLEX TO CULTURE: YES
UROBILINOGEN, URINE: 0.2 E.U./DL
WBC # BLD: 3.3 K/UL (ref 4.8–10.8)
WBC UA: ABNORMAL /HPF (ref 0–5)

## 2019-04-10 RX ORDER — METFORMIN HYDROCHLORIDE 500 MG/1
500 TABLET, EXTENDED RELEASE ORAL
COMMUNITY
Start: 2015-10-26 | End: 2019-07-01

## 2019-04-10 NOTE — PROGRESS NOTES
Routine lab work today. 5 week after med start. Daughter is with pt today. Release of information signed from pt for daughter and for partner. Prior she was not wanting anyone to know or discuss dx. She has now opened up more and Yovani Alba both know about dx. Raleigh Calle states her mom is now doing much better. Pt states she's not sleeping all day. She can do stuff around house again. States taking medications daily. Tolerating well. Denies any problems with medications or pharmacy. Daughter did help with translation a bit. Request office call with reminders for apts if possible. Brain to call with any apts not Epic.         New MARIA FERNANDA signed and scanned into media

## 2019-04-12 LAB
ABSOLUTE CD 4 HELPER: 183 CELLS/UL (ref 430–1800)
AFB STAIN: NORMAL
CD4 % HELPER T CELL: 11 % (ref 32–64)
FINAL REPORT: NORMAL
LYMPHOCYTE SUBSET PANEL 2 INFO: ABNORMAL
PRELIMINARY: NORMAL
URINE CULTURE, ROUTINE: NORMAL

## 2019-04-13 LAB
HAV AB SERPL IA-ACNC: NEGATIVE
HIV-1 QNT LOG, IU/ML: 1.75 LOG CPY/ML
HIV-1 QNT, IU/ML: 56 CPY/ML
INTERPRETATION: DETECTED

## 2019-04-19 ENCOUNTER — TELEPHONE (OUTPATIENT)
Dept: INFECTIOUS DISEASES | Age: 44
End: 2019-04-19

## 2019-04-24 ENCOUNTER — OFFICE VISIT (OUTPATIENT)
Dept: INFECTIOUS DISEASES | Age: 44
End: 2019-04-24
Payer: COMMERCIAL

## 2019-04-24 ENCOUNTER — NURSE ONLY (OUTPATIENT)
Dept: INFECTIOUS DISEASES | Age: 44
End: 2019-04-24

## 2019-04-24 VITALS
HEART RATE: 105 BPM | TEMPERATURE: 98.2 F | DIASTOLIC BLOOD PRESSURE: 72 MMHG | BODY MASS INDEX: 20.43 KG/M2 | WEIGHT: 111 LBS | HEIGHT: 62 IN | RESPIRATION RATE: 16 BRPM | SYSTOLIC BLOOD PRESSURE: 109 MMHG

## 2019-04-24 DIAGNOSIS — B20 HUMAN IMMUNODEFICIENCY VIRUS (HCC): Primary | ICD-10-CM

## 2019-04-24 DIAGNOSIS — B20 HIV (HUMAN IMMUNODEFICIENCY VIRUS INFECTION) (HCC): Primary | ICD-10-CM

## 2019-04-24 DIAGNOSIS — G93.40 ENCEPHALOPATHY: ICD-10-CM

## 2019-04-24 DIAGNOSIS — G93.9 BRAIN LESION: ICD-10-CM

## 2019-04-24 DIAGNOSIS — F91.9 BEHAVIOR DISTURBANCE: ICD-10-CM

## 2019-04-24 PROCEDURE — 99214 OFFICE O/P EST MOD 30 MIN: CPT | Performed by: INTERNAL MEDICINE

## 2019-04-24 PROCEDURE — G8420 CALC BMI NORM PARAMETERS: HCPCS | Performed by: INTERNAL MEDICINE

## 2019-04-24 PROCEDURE — G8427 DOCREV CUR MEDS BY ELIG CLIN: HCPCS | Performed by: INTERNAL MEDICINE

## 2019-04-24 PROCEDURE — 1036F TOBACCO NON-USER: CPT | Performed by: INTERNAL MEDICINE
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
## 2019-05-08 ENCOUNTER — TELEPHONE (OUTPATIENT)
Dept: INFECTIOUS DISEASES | Age: 44
End: 2019-05-08

## 2019-05-08 NOTE — TELEPHONE ENCOUNTER
Garrie Felty pts boyfriend called in with concerns about pt MRI not being completed. Per Garrie Felty and pt MRI was to be completed today. They were told by registration that she needed to reschedule \"bc she need to bring someone with her that speaks [place]\"   This is upsetting to pt. Called MRI. Spoke with multiple people. Justyna Roblero states she could not see pt on schedule or was she rescheduled. Call placed to back to pt. After discussing with pt and Garrie Felty pt had a scheduled apt with Dr Destinee Sandy. This is where problem occurred per pt. They would not see her due to \" they didn't have anyone to translate\"     Call placed to Dr Destinee Sandy office. They are at lunch 1130-1 pm will call after 1--- denies that this occurred. Pt confused apt??     Pt is now scheduled for 5/10/19 at 330. Garrie Felty will go her pt. She is ok with this. They both deny and needs at this time.

## 2019-05-20 ENCOUNTER — TELEPHONE (OUTPATIENT)
Dept: INFECTIOUS DISEASES | Age: 44
End: 2019-05-20

## 2019-05-23 ENCOUNTER — HOSPITAL ENCOUNTER (OUTPATIENT)
Dept: MRI IMAGING | Age: 44
Discharge: HOME | End: 2019-05-25
Payer: COMMERCIAL

## 2019-05-23 DIAGNOSIS — G37.9 DEMYELINATING DISEASE (HCC): ICD-10-CM

## 2019-05-23 PROCEDURE — 6360000004 HC RX CONTRAST MEDICATION: Performed by: SPECIALIST

## 2019-05-23 PROCEDURE — A9579 GAD-BASE MR CONTRAST NOS,1ML: HCPCS | Performed by: SPECIALIST

## 2019-05-23 PROCEDURE — 70553 MRI BRAIN STEM W/O & W/DYE: CPT

## 2019-05-23 RX ADMIN — GADOTERIDOL 10 ML: 279.3 INJECTION, SOLUTION INTRAVENOUS at 09:52

## 2019-06-07 DIAGNOSIS — B20 HIV (HUMAN IMMUNODEFICIENCY VIRUS INFECTION) (HCC): Primary | ICD-10-CM

## 2019-06-19 ENCOUNTER — TELEPHONE (OUTPATIENT)
Dept: INFECTIOUS DISEASES | Age: 44
End: 2019-06-19

## 2019-06-19 ENCOUNTER — NURSE ONLY (OUTPATIENT)
Dept: INFECTIOUS DISEASES | Age: 44
End: 2019-06-19

## 2019-06-19 NOTE — PROGRESS NOTES
Doing well. Here with partner. States both are doing much better  Pt feels a lot better. Weight increase. tolerating eating more now. Following with Neuro as directed. F/u next week. States 100% with all medications. Discussed need for PCP. List given and Kinyarwanda speaking doctor Chalino Diaz noted. Denies any other needs at this time and will call as needed. PATIENT WAS IN THE CLINIC TODAY AND RECEIVED THE FOLLOWING VACCINE Twinrex 2 FROM THE [address] [place] [address] NURSE:      THIS NOTE IS SOLELY FOR DOCUMENTATION PURPOSES AND THERE IS NO CHARGE. THESE VACCINES WERE PAID FOR BY EITHER THE STATE OR THE PATIENT'S INSURANCE PLAN. PATIENT WAS INSTRUCTED TO WAIT 20 MINUTES AFTERWARDS, AND REPORT ANY ADVERSE REACTIONS, IMMEDIATELY.

## 2019-06-19 NOTE — TELEPHONE ENCOUNTER
Pt partner called in requesting help with transport for pt for neuro appt. Rn and pt reviewed additional transport options and assistance. It is determined he will use [facility] as last report for help. [facility] notified. Pt aware transport will be there for  about 30 mins prior to apt.        Dr Reyes Kick appt at [facility] Monday 6-24-19 at 1030 am

## 2019-07-01 ENCOUNTER — OFFICE VISIT (OUTPATIENT)
Dept: FAMILY MEDICINE CLINIC | Age: 44
End: 2019-07-01
Payer: COMMERCIAL

## 2019-07-01 VITALS
BODY MASS INDEX: 20.72 KG/M2 | OXYGEN SATURATION: 98 % | DIASTOLIC BLOOD PRESSURE: 62 MMHG | SYSTOLIC BLOOD PRESSURE: 100 MMHG | TEMPERATURE: 98.1 F | WEIGHT: 112.6 LBS | RESPIRATION RATE: 16 BRPM | HEIGHT: 62 IN | HEART RATE: 64 BPM

## 2019-07-01 DIAGNOSIS — R83.8 OLIGOCLONAL BANDS IN CEREBROSPINAL FLUID: Chronic | ICD-10-CM

## 2019-07-01 DIAGNOSIS — E44.0 MODERATE MALNUTRITION (HCC): ICD-10-CM

## 2019-07-01 DIAGNOSIS — Z13.220 LIPID SCREENING: ICD-10-CM

## 2019-07-01 DIAGNOSIS — R90.89 ABNORMAL BRAIN MRI: Chronic | ICD-10-CM

## 2019-07-01 DIAGNOSIS — B20 HIV INFECTION, UNSPECIFIED SYMPTOM STATUS (HCC): Primary | ICD-10-CM

## 2019-07-01 DIAGNOSIS — Z12.39 BREAST CANCER SCREENING: ICD-10-CM

## 2019-07-01 DIAGNOSIS — R73.09 ELEVATED GLUCOSE: Chronic | ICD-10-CM

## 2019-07-01 DIAGNOSIS — G93.40 ENCEPHALOPATHY: ICD-10-CM

## 2019-07-01 DIAGNOSIS — F32.5 MAJOR DEPRESSIVE DISORDER WITH SINGLE EPISODE, IN FULL REMISSION (HCC): ICD-10-CM

## 2019-07-01 DIAGNOSIS — D50.8 IRON DEFICIENCY ANEMIA SECONDARY TO INADEQUATE DIETARY IRON INTAKE: Chronic | ICD-10-CM

## 2019-07-01 PROCEDURE — G8420 CALC BMI NORM PARAMETERS: HCPCS | Performed by: FAMILY MEDICINE

## 2019-07-01 PROCEDURE — 99204 OFFICE O/P NEW MOD 45 MIN: CPT | Performed by: FAMILY MEDICINE

## 2019-07-01 PROCEDURE — 1036F TOBACCO NON-USER: CPT | Performed by: FAMILY MEDICINE

## 2019-07-01 PROCEDURE — G8427 DOCREV CUR MEDS BY ELIG CLIN: HCPCS | Performed by: FAMILY MEDICINE

## 2019-07-01 RX ORDER — METFORMIN HYDROCHLORIDE 500 MG/1
500 TABLET, EXTENDED RELEASE ORAL
Qty: 90 TABLET | Refills: 3 | Status: CANCELLED | OUTPATIENT
Start: 2019-07-01

## 2019-07-01 NOTE — PROGRESS NOTES
present. Cardiovascular: Normal rate, regular rhythm and normal heart sounds. Pulmonary/Chest: Effort normal and breath sounds normal.   Lymphadenopathy:     She has no cervical adenopathy. Neurological: She is alert and oriented to person, place, and time. Skin: Skin is warm and dry. No rash noted. Psychiatric: She has a normal mood and affect. No results found for: LABA1C  Lab Results   Component Value Date    CREATININE 0.51 04/10/2019     Lab Results   Component Value Date    ALT 10 04/10/2019    AST 18 04/10/2019     No results found for: CHOL, TRIG, HDL, LDLCALC, LDLDIRECT     Assessment & Plan   Visit Diagnoses and Associated Orders     HIV infection, unspecified symptom status ([facility]    -  Primary    Improving, fair control with persistently detectable viral levels. Tolerating H ART therapy. Followed by ID. Elevated glucose        Check hemoglobin A1c. Discontinue metformin at this time. Hemoglobin A1C [[id number] Custom]   - Future Order         Moderate malnutrition (HCC)        Improving, well controlled. Check labs. If normal, would DC current vitamin supplementation. CBC With Auto Differential [id number] Custom]   - Future Order    Comprehensive Metabolic Panel [[id number] Custom]   - Future Order         Oligoclonal bands in cerebrospinal fluid        Has follow-up with Dr. Ryan Blanchard         Abnormal brain MRI        Follow-up with Dr. Ryan Blanchard         Major depressive disorder with single episode, in full remission ([facility]        Improving, well-controlled on citalopram 10 mg daily. Follow closely. Encephalopathy        Resolved. UnClear etiology. Iron deficiency anemia secondary to inadequate dietary iron intake        Follow labs. If stable then DC iron and follow. Consider colonoscopy.     CBC With Auto Differential [id number] Custom]   - Future Order         Lipid screening        Lipid, Fasting [[id number] Custom]   - Future Order         Breast cancer screening        BRYSON DIGITAL SCREEN W OR WO CAD BILATERAL [[id number] Custom]   - Future Order                 Reviewed with the patient: all disease processes, current clinical status, medications, activities and diet.      Side effects, adverse effects of the medication prescribed today, as well as treatment plan/ rationale and result expectations have been discussed with the patient who expresses understanding and desires to proceed.     Close follow up to evaluate treatment results and for coordination of care. I have reviewed the patient's medical history in detail and updated the computerized patient record. More than 50% of the 45 minute appointment was spent in face-to-face counseling, education and care coordination. Please note this report has been partially produced using speech recognition software and may contain mistakes related to that system including errors in grammar, punctuation and spelling as well as words and phrases that may seem inappropriate. If there are questions or concerns, please feel free to contact me to clarify. Orders Placed This Encounter   Procedures    BRYSON DIGITAL SCREEN W OR WO CAD BILATERAL     Standing Status:   Future     Standing Expiration Date:   9/1/2020     Order Specific Question:   Reason for exam:     Answer:   breast ca screening    Lipid, Fasting     Standing Status:   Future     Standing Expiration Date:   6/23/2020    Hemoglobin A1C     Standing Status:   Future     Standing Expiration Date:   9/1/2019    CBC With Auto Differential     Standing Status:   Future     Standing Expiration Date:   7/1/2020    Comprehensive Metabolic Panel     Standing Status:   Future     Standing Expiration Date:   10/1/2019     No orders of the defined types were placed in this encounter.     Medications Discontinued During This Encounter   Medication Reason    dapsone 100 MG tablet LIST CLEANUP    metFORMIN (GLUCOPHAGE-XR) 500 MG extended release tablet LIST CLEANUP     Return for [employer]

## 2019-07-10 ENCOUNTER — NURSE ONLY (OUTPATIENT)
Dept: INFECTIOUS DISEASES | Age: 44
End: 2019-07-10

## 2019-07-10 DIAGNOSIS — D50.8 IRON DEFICIENCY ANEMIA SECONDARY TO INADEQUATE DIETARY IRON INTAKE: Chronic | ICD-10-CM

## 2019-07-10 DIAGNOSIS — B20 HIV INFECTION, UNSPECIFIED SYMPTOM STATUS (HCC): Primary | ICD-10-CM

## 2019-07-10 DIAGNOSIS — B20 HIV INFECTION, UNSPECIFIED SYMPTOM STATUS (HCC): ICD-10-CM

## 2019-07-10 DIAGNOSIS — E44.0 MODERATE MALNUTRITION (HCC): ICD-10-CM

## 2019-07-10 LAB
ALBUMIN SERPL-MCNC: 4.2 G/DL (ref 3.5–4.6)
ALP BLD-CCNC: 50 U/L (ref 40–130)
ALT SERPL-CCNC: 14 U/L (ref 0–33)
ANION GAP SERPL CALCULATED.3IONS-SCNC: 14 MEQ/L (ref 9–15)
ANISOCYTOSIS: ABNORMAL
AST SERPL-CCNC: 20 U/L (ref 0–35)
BASOPHILS ABSOLUTE: 0 K/UL (ref 0–0.2)
BASOPHILS RELATIVE PERCENT: 0.9 %
BILIRUB SERPL-MCNC: 0.3 MG/DL (ref 0.2–0.7)
BUN BLDV-MCNC: 10 MG/DL (ref 6–20)
CALCIUM SERPL-MCNC: 9.9 MG/DL (ref 8.5–9.9)
CHLORIDE BLD-SCNC: 102 MEQ/L (ref 95–107)
CHOLESTEROL, TOTAL: 233 MG/DL (ref 0–199)
CO2: 26 MEQ/L (ref 20–31)
CREAT SERPL-MCNC: 0.59 MG/DL (ref 0.5–0.9)
EOSINOPHILS ABSOLUTE: 0.3 K/UL (ref 0–0.7)
EOSINOPHILS RELATIVE PERCENT: 8.6 %
GFR AFRICAN AMERICAN: >60
GFR NON-AFRICAN AMERICAN: >60
GLOBULIN: 4.1 G/DL (ref 2.3–3.5)
GLUCOSE BLD-MCNC: 63 MG/DL (ref 70–99)
HBA1C MFR BLD: 5.1 % (ref 4.8–5.9)
HCT VFR BLD CALC: 40.8 % (ref 37–47)
HDLC SERPL-MCNC: 57 MG/DL (ref 40–59)
HEMOGLOBIN: 13.9 G/DL (ref 12–16)
HEPATITIS C ANTIBODY INTERPRETATION: NORMAL
LDL CHOLESTEROL CALCULATED: 158 MG/DL (ref 0–129)
LYMPHOCYTES ABSOLUTE: 1 K/UL (ref 1–4.8)
LYMPHOCYTES RELATIVE PERCENT: 31.2 %
MCH RBC QN AUTO: 31.9 PG (ref 27–31.3)
MCHC RBC AUTO-ENTMCNC: 34.1 % (ref 33–37)
MCV RBC AUTO: 93.7 FL (ref 82–100)
MONOCYTES ABSOLUTE: 0.3 K/UL (ref 0.2–0.8)
MONOCYTES RELATIVE PERCENT: 7.8 %
NEUTROPHILS ABSOLUTE: 1.7 K/UL (ref 1.4–6.5)
NEUTROPHILS RELATIVE PERCENT: 51.5 %
PDW BLD-RTO: 12.4 % (ref 11.5–14.5)
PLATELET # BLD: 240 K/UL (ref 130–400)
PLATELET SLIDE REVIEW: ADEQUATE
POTASSIUM SERPL-SCNC: 3.9 MEQ/L (ref 3.4–4.9)
RBC # BLD: 4.36 M/UL (ref 4.2–5.4)
SLIDE REVIEW: ABNORMAL
SODIUM BLD-SCNC: 142 MEQ/L (ref 135–144)
TOTAL PROTEIN: 8.3 G/DL (ref 6.3–8)
TRIGL SERPL-MCNC: 88 MG/DL (ref 0–150)
WBC # BLD: 3.2 K/UL (ref 4.8–10.8)

## 2019-07-12 LAB
ABSOLUTE CD 4 HELPER: 214 CELLS/UL (ref 430–1800)
CD4 % HELPER T CELL: 18 % (ref 32–64)
LYMPHOCYTE SUBSET PANEL 2 INFO: ABNORMAL

## 2019-07-13 LAB
QUANTI TB GOLD PLUS: NEGATIVE
QUANTI TB1 MINUS NIL: 0 IU/ML (ref 0–0.34)
QUANTI TB2 MINUS NIL: 0 IU/ML (ref 0–0.34)
QUANTIFERON MITOGEN: 2.55 IU/ML
QUANTIFERON NIL: 0.02 IU/ML

## 2019-07-14 LAB
HIV-1 QNT LOG, IU/ML: NOT DETECTED LOG CPY/ML
HIV-1 QNT, IU/ML: NOT DETECTED CPY/ML
INTERPRETATION: NOT DETECTED

## 2019-07-16 LAB
C. TRACHOMATIS DNA ,URINE: NEGATIVE
N. GONORRHOEAE DNA, URINE: NEGATIVE

## 2019-07-17 ENCOUNTER — HOSPITAL ENCOUNTER (OUTPATIENT)
Dept: WOMENS IMAGING | Age: 44
Discharge: HOME | End: 2019-07-19
Payer: COMMERCIAL

## 2019-07-17 DIAGNOSIS — Z12.39 BREAST CANCER SCREENING: ICD-10-CM

## 2019-07-17 PROCEDURE — 77067 SCR MAMMO BI INCL CAD: CPT

## 2019-07-18 DIAGNOSIS — R92.8 ABNORMAL MAMMOGRAM: Primary | ICD-10-CM

## 2019-07-24 ENCOUNTER — HOSPITAL ENCOUNTER (OUTPATIENT)
Dept: WOMENS IMAGING | Age: 44
Discharge: HOME | End: 2019-07-26
Payer: COMMERCIAL

## 2019-07-24 ENCOUNTER — HOSPITAL ENCOUNTER (OUTPATIENT)
Dept: ULTRASOUND IMAGING | Age: 44
Discharge: HOME | End: 2019-07-26
Payer: COMMERCIAL

## 2019-07-24 DIAGNOSIS — R73.09 ELEVATED GLUCOSE: Chronic | ICD-10-CM

## 2019-07-24 DIAGNOSIS — Z13.220 LIPID SCREENING: ICD-10-CM

## 2019-07-24 DIAGNOSIS — R92.8 ABNORMAL MAMMOGRAM: ICD-10-CM

## 2019-07-24 LAB
ALBUMIN SERPL-MCNC: 4.4 G/DL (ref 3.5–4.6)
ALP BLD-CCNC: 51 U/L (ref 40–130)
ALT SERPL-CCNC: 12 U/L (ref 0–33)
ANION GAP SERPL CALCULATED.3IONS-SCNC: 13 MEQ/L (ref 9–15)
AST SERPL-CCNC: 14 U/L (ref 0–35)
BASOPHILS ABSOLUTE: 0 K/UL (ref 0–0.2)
BASOPHILS RELATIVE PERCENT: 1 %
BILIRUB SERPL-MCNC: 0.4 MG/DL (ref 0.2–0.7)
BUN BLDV-MCNC: 10 MG/DL (ref 6–20)
CALCIUM SERPL-MCNC: 10.2 MG/DL (ref 8.5–9.9)
CHLORIDE BLD-SCNC: 101 MEQ/L (ref 95–107)
CHOLESTEROL, FASTING: 227 MG/DL (ref 0–199)
CO2: 27 MEQ/L (ref 20–31)
CREAT SERPL-MCNC: 0.59 MG/DL (ref 0.5–0.9)
EOSINOPHILS ABSOLUTE: 0.3 K/UL (ref 0–0.7)
EOSINOPHILS RELATIVE PERCENT: 7.7 %
GFR AFRICAN AMERICAN: >60
GFR NON-AFRICAN AMERICAN: >60
GLOBULIN: 4 G/DL (ref 2.3–3.5)
GLUCOSE BLD-MCNC: 87 MG/DL (ref 70–99)
HBA1C MFR BLD: 5.1 % (ref 4.8–5.9)
HCT VFR BLD CALC: 43.5 % (ref 37–47)
HDLC SERPL-MCNC: 61 MG/DL (ref 40–59)
HEMOGLOBIN: 15.1 G/DL (ref 12–16)
LDL CHOLESTEROL CALCULATED: 144 MG/DL (ref 0–129)
LYMPHOCYTES ABSOLUTE: 1.2 K/UL (ref 1–4.8)
LYMPHOCYTES RELATIVE PERCENT: 28.9 %
MCH RBC QN AUTO: 31.8 PG (ref 27–31.3)
MCHC RBC AUTO-ENTMCNC: 34.7 % (ref 33–37)
MCV RBC AUTO: 91.6 FL (ref 82–100)
MONOCYTES ABSOLUTE: 0.4 K/UL (ref 0.2–0.8)
MONOCYTES RELATIVE PERCENT: 8.8 %
NEUTROPHILS ABSOLUTE: 2.3 K/UL (ref 1.4–6.5)
NEUTROPHILS RELATIVE PERCENT: 53.6 %
PDW BLD-RTO: 12.4 % (ref 11.5–14.5)
PLATELET # BLD: 221 K/UL (ref 130–400)
POTASSIUM SERPL-SCNC: 4.6 MEQ/L (ref 3.4–4.9)
RBC # BLD: 4.75 M/UL (ref 4.2–5.4)
SODIUM BLD-SCNC: 141 MEQ/L (ref 135–144)
TOTAL PROTEIN: 8.4 G/DL (ref 6.3–8)
TRIGLYCERIDE, FASTING: 111 MG/DL (ref 0–150)
WBC # BLD: 4.3 K/UL (ref 4.8–10.8)

## 2019-07-24 PROCEDURE — 76642 ULTRASOUND BREAST LIMITED: CPT

## 2019-07-24 PROCEDURE — G0279 TOMOSYNTHESIS, MAMMO: HCPCS

## 2019-07-26 ENCOUNTER — OFFICE VISIT (OUTPATIENT)
Dept: INFECTIOUS DISEASES | Age: 44
End: 2019-07-26
Payer: COMMERCIAL

## 2019-07-26 VITALS
SYSTOLIC BLOOD PRESSURE: 96 MMHG | HEIGHT: 62 IN | TEMPERATURE: 98.1 F | WEIGHT: 129 LBS | BODY MASS INDEX: 23.74 KG/M2 | DIASTOLIC BLOOD PRESSURE: 65 MMHG | RESPIRATION RATE: 18 BRPM | HEART RATE: 84 BPM

## 2019-07-26 DIAGNOSIS — Z21 NEWLY DIAGNOSED HIV-POSITIVE (HCC): ICD-10-CM

## 2019-07-26 DIAGNOSIS — B20 HIV INFECTION, UNSPECIFIED SYMPTOM STATUS (HCC): Primary | ICD-10-CM

## 2019-07-26 DIAGNOSIS — F91.9 BEHAVIOR DISTURBANCE: ICD-10-CM

## 2019-07-26 DIAGNOSIS — G93.9 BRAIN LESION: ICD-10-CM

## 2019-07-26 PROCEDURE — G8420 CALC BMI NORM PARAMETERS: HCPCS | Performed by: INTERNAL MEDICINE

## 2019-07-26 PROCEDURE — 1036F TOBACCO NON-USER: CPT | Performed by: INTERNAL MEDICINE

## 2019-07-26 PROCEDURE — G8427 DOCREV CUR MEDS BY ELIG CLIN: HCPCS | Performed by: INTERNAL MEDICINE

## 2019-07-26 PROCEDURE — 99214 OFFICE O/P EST MOD 30 MIN: CPT | Performed by: INTERNAL MEDICINE
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
## 2019-08-02 ENCOUNTER — TELEPHONE (OUTPATIENT)
Dept: INFECTIOUS DISEASES | Age: 44
End: 2019-08-02

## 2019-08-07 ENCOUNTER — OFFICE VISIT (OUTPATIENT)
Dept: FAMILY MEDICINE CLINIC | Age: 44
End: 2019-08-07
Payer: COMMERCIAL

## 2019-08-07 VITALS
HEIGHT: 62 IN | HEART RATE: 66 BPM | DIASTOLIC BLOOD PRESSURE: 62 MMHG | BODY MASS INDEX: 23.92 KG/M2 | SYSTOLIC BLOOD PRESSURE: 104 MMHG | WEIGHT: 130 LBS | RESPIRATION RATE: 16 BRPM | OXYGEN SATURATION: 98 % | TEMPERATURE: 98 F

## 2019-08-07 DIAGNOSIS — R92.8 ABNORMAL MAMMOGRAM: ICD-10-CM

## 2019-08-07 DIAGNOSIS — G93.40 ENCEPHALOPATHY: Primary | ICD-10-CM

## 2019-08-07 DIAGNOSIS — R90.89 ABNORMAL BRAIN MRI: Chronic | ICD-10-CM

## 2019-08-07 DIAGNOSIS — R83.8 OLIGOCLONAL BANDS IN CEREBROSPINAL FLUID: Chronic | ICD-10-CM

## 2019-08-07 PROCEDURE — 99213 OFFICE O/P EST LOW 20 MIN: CPT | Performed by: FAMILY MEDICINE

## 2019-08-07 PROCEDURE — G8427 DOCREV CUR MEDS BY ELIG CLIN: HCPCS | Performed by: FAMILY MEDICINE

## 2019-08-07 PROCEDURE — G8420 CALC BMI NORM PARAMETERS: HCPCS | Performed by: FAMILY MEDICINE

## 2019-08-07 PROCEDURE — 1036F TOBACCO NON-USER: CPT | Performed by: FAMILY MEDICINE

## 2019-08-07 NOTE — PROGRESS NOTES
in this encounter. There are no discontinued medications. No follow-ups on file. Controlled Substance Monitoring:    Acute and Chronic Pain Monitoring:   RX Monitoring 1/2/2018   Attestation The Prescription Monitoring Report for this patient was reviewed today.            Charleen Harrell MD

## 2019-08-22 ENCOUNTER — TELEPHONE (OUTPATIENT)
Dept: INFECTIOUS DISEASES | Age: 44
End: 2019-08-22

## 2019-08-22 ENCOUNTER — NURSE ONLY (OUTPATIENT)
Dept: INFECTIOUS DISEASES | Age: 44
End: 2019-08-22

## 2019-08-22 NOTE — TELEPHONE ENCOUNTER
Attempted to reach pt regarding need to update RW eligibility paperwork. She requested office call Shruti Menjivar her boyfriend due to language barrier and he helps her keep track of all appts. Call placed to Shruti Menjivar. Not available. Requested to call office back.

## 2019-08-29 ENCOUNTER — OFFICE VISIT (OUTPATIENT)
Dept: FAMILY MEDICINE CLINIC | Age: 44
End: 2019-08-29
Payer: COMMERCIAL

## 2019-08-29 VITALS
HEART RATE: 62 BPM | HEIGHT: 62 IN | RESPIRATION RATE: 16 BRPM | DIASTOLIC BLOOD PRESSURE: 62 MMHG | WEIGHT: 131 LBS | SYSTOLIC BLOOD PRESSURE: 110 MMHG | OXYGEN SATURATION: 97 % | BODY MASS INDEX: 24.11 KG/M2

## 2019-08-29 DIAGNOSIS — K62.89 HIGH RISK HUMAN PAPILLOMA VIRUS (HPV) INFECTION OF ANUS: ICD-10-CM

## 2019-08-29 DIAGNOSIS — B20 HIV INFECTION, UNSPECIFIED SYMPTOM STATUS (HCC): ICD-10-CM

## 2019-08-29 DIAGNOSIS — Z12.4 CERVICAL CANCER SCREENING: Primary | ICD-10-CM

## 2019-08-29 DIAGNOSIS — B97.7 HIGH RISK HUMAN PAPILLOMA VIRUS (HPV) INFECTION OF ANUS: ICD-10-CM

## 2019-08-29 DIAGNOSIS — Z12.4 CERVICAL CANCER SCREENING: ICD-10-CM

## 2019-08-29 PROCEDURE — 99396 PREV VISIT EST AGE 40-64: CPT | Performed by: FAMILY MEDICINE

## 2019-08-29 NOTE — PROGRESS NOTES
Not on file    Intimate partner violence:     Fear of current or ex partner: Not on file     Emotionally abused: Not on file     Physically abused: Not on file     Forced sexual activity: Not on file   Other Topics Concern    Not on file   Social History Narrative    Not on file     Family History   Problem Relation Age of Onset    High Blood Pressure Mother     Stroke Mother     Diabetes Mother     No Known Problems Father      Allergies   Allergen Reactions    Aspirin     Metronidazole Swelling    Sulfa Antibiotics Swelling     Current Outpatient Medications   Medication Sig Dispense Refill    GENVOYA [phone] MG TABLET TAKE 1 TABLET DAILY  2    citalopram (CELEXA) 10 MG tablet Take 1 tablet by mouth daily 30 tablet 3    ferrous sulfate 325 (65 Fe) MG tablet Take 1 tablet by mouth daily (with breakfast) 30 tablet 11    folic acid (FOLVITE) 1 MG tablet Take 1 tablet by mouth daily 30 tablet 3    b complex vitamins capsule Take 1 capsule by mouth daily 30 capsule 3    vitamin B-1 100 MG tablet Take 1 tablet by mouth daily 30 tablet 3     No current facility-administered medications for this visit. PMH, Surgical Hx, Family Hx, and Social Hxreviewed and updated. Health Maintenance reviewed.     Objective    Vitals:    08/29/19 0849   BP: 110/62   Pulse: 62   Resp: 16   SpO2: 97%   Weight: 131 lb (59.4 kg)   Height: 5' 2\" (1.575 m)        Physical Exam      Lab Results   Component Value Date    LABA1C 5.1 07/24/2019    LABA1C 5.1 07/10/2019     Lab Results   Component Value Date    CREATININE 0.59 07/24/2019     Lab Results   Component Value Date    ALT 12 07/24/2019    AST 14 07/24/2019     Lab Results   Component Value Date    CHOL 233 (H) 07/10/2019    TRIG 88 07/10/2019    HDL 61 (H) 07/24/2019    LDLCALC 144 (H) 07/24/2019        Assessment & Plan   Visit Diagnoses and Associated Orders     Cervical cancer screening    -  Primary    Pap Smear [ Custom]   - Future Order, Pending Order               ***  No orders of the defined types were placed in this encounter. No orders of the defined types were placed in this encounter. There are no discontinued medications. No follow-ups on file. Controlled Substance Monitoring:    Acute and Chronic Pain Monitoring:   RX Monitoring 1/2/2018   Attestation The Prescription Monitoring Report for this patient was reviewed today.            Adal Hernandez MD

## 2019-08-30 LAB
REASON FOR REJECTION: NORMAL
REJECTED TEST: NORMAL

## 2019-09-05 LAB
C TRACH DNA GENITAL QL NAA+PROBE: NEGATIVE
HPV COMMENT: NORMAL
HPV TYPE 16: NOT DETECTED
HPV TYPE 18: NOT DETECTED
HPVOH (OTHER TYPES): NOT DETECTED
N. GONORRHOEAE DNA: NEGATIVE

## 2019-09-20 ENCOUNTER — TELEPHONE (OUTPATIENT)
Dept: INFECTIOUS DISEASES | Age: 44
End: 2019-09-20

## 2019-11-14 LAB
MISCELLANEOUS LAB TEST ORDER: NORMAL
WHOPPER PROMPT: NORMAL

## 2019-12-05 ENCOUNTER — TELEPHONE (OUTPATIENT)
Dept: INFECTIOUS DISEASES | Age: 44
End: 2019-12-05

## 2019-12-05 ENCOUNTER — OFFICE VISIT (OUTPATIENT)
Dept: FAMILY MEDICINE CLINIC | Age: 44
End: 2019-12-05
Payer: COMMERCIAL

## 2019-12-05 VITALS
TEMPERATURE: 97.2 F | SYSTOLIC BLOOD PRESSURE: 124 MMHG | DIASTOLIC BLOOD PRESSURE: 66 MMHG | BODY MASS INDEX: 24.66 KG/M2 | HEART RATE: 79 BPM | WEIGHT: 134 LBS | HEIGHT: 62 IN | RESPIRATION RATE: 16 BRPM | OXYGEN SATURATION: 95 %

## 2019-12-05 DIAGNOSIS — B35.3 TINEA PEDIS OF LEFT FOOT: ICD-10-CM

## 2019-12-05 DIAGNOSIS — B20 HIV INFECTION, UNSPECIFIED SYMPTOM STATUS (HCC): ICD-10-CM

## 2019-12-05 DIAGNOSIS — F32.5 MAJOR DEPRESSIVE DISORDER WITH SINGLE EPISODE, IN FULL REMISSION (HCC): Primary | ICD-10-CM

## 2019-12-05 DIAGNOSIS — E63.9 NUTRITIONAL DEFICIENCY: Chronic | ICD-10-CM

## 2019-12-05 DIAGNOSIS — Z23 FLU VACCINE NEED: ICD-10-CM

## 2019-12-05 PROBLEM — M25.50 JOINT PAIN: Chronic | Status: ACTIVE | Noted: 2019-12-05

## 2019-12-05 PROBLEM — G93.40 ENCEPHALOPATHY: Status: RESOLVED | Noted: 2019-02-10 | Resolved: 2019-12-05

## 2019-12-05 PROBLEM — E44.0 MODERATE MALNUTRITION (HCC): Status: RESOLVED | Noted: 2019-02-18 | Resolved: 2019-12-05

## 2019-12-05 PROBLEM — D50.8 IRON DEFICIENCY ANEMIA SECONDARY TO INADEQUATE DIETARY IRON INTAKE: Chronic | Status: RESOLVED | Noted: 2019-07-01 | Resolved: 2019-12-05

## 2019-12-05 PROBLEM — R90.89 ABNORMAL BRAIN MRI: Chronic | Status: RESOLVED | Noted: 2019-07-01 | Resolved: 2019-12-05

## 2019-12-05 PROCEDURE — 1036F TOBACCO NON-USER: CPT | Performed by: FAMILY MEDICINE

## 2019-12-05 PROCEDURE — G8420 CALC BMI NORM PARAMETERS: HCPCS | Performed by: FAMILY MEDICINE

## 2019-12-05 PROCEDURE — G8427 DOCREV CUR MEDS BY ELIG CLIN: HCPCS | Performed by: FAMILY MEDICINE

## 2019-12-05 PROCEDURE — 90471 IMMUNIZATION ADMIN: CPT | Performed by: FAMILY MEDICINE

## 2019-12-05 PROCEDURE — 99214 OFFICE O/P EST MOD 30 MIN: CPT | Performed by: FAMILY MEDICINE

## 2019-12-05 PROCEDURE — 90688 IIV4 VACCINE SPLT 0.5 ML IM: CPT | Performed by: FAMILY MEDICINE

## 2019-12-05 PROCEDURE — G8482 FLU IMMUNIZE ORDER/ADMIN: HCPCS | Performed by: FAMILY MEDICINE

## 2019-12-05 RX ORDER — DAPSONE 100 MG/1
TABLET ORAL
COMMUNITY
Start: 2019-12-03 | End: 2021-06-10

## 2019-12-05 RX ORDER — CHOLECALCIFEROL TAB 125 MCG (5000 UNIT) 125 MCG (5000 UT)
TAB
Refills: 11 | COMMUNITY
Start: 2019-11-03 | End: 2020-03-24

## 2019-12-05 RX ORDER — NORETHINDRONE ACETATE AND ETHINYL ESTRADIOL AND FERROUS FUMARATE 1MG-20(21)
KIT ORAL
COMMUNITY
Start: 2019-12-03 | End: 2020-03-09 | Stop reason: ALTCHOICE

## 2019-12-05 RX ORDER — MEDROXYPROGESTERONE ACETATE 10 MG/1
TABLET ORAL
COMMUNITY
Start: 2019-12-03 | End: 2020-03-09 | Stop reason: ALTCHOICE

## 2019-12-05 RX ORDER — PRENATAL VIT 91/IRON/FOLIC/DHA 28-975-200
COMBINATION PACKAGE (EA) ORAL
Qty: 42 G | Refills: 2 | Status: SHIPPED | OUTPATIENT
Start: 2019-12-05 | End: 2020-12-07

## 2019-12-06 DIAGNOSIS — B20 HIV INFECTION, UNSPECIFIED SYMPTOM STATUS (HCC): ICD-10-CM

## 2019-12-06 DIAGNOSIS — E63.9 NUTRITIONAL DEFICIENCY: Chronic | ICD-10-CM

## 2019-12-06 LAB
ALBUMIN SERPL-MCNC: 4.7 G/DL (ref 3.5–4.6)
ALP BLD-CCNC: 53 U/L (ref 40–130)
ALT SERPL-CCNC: 12 U/L (ref 0–33)
ANION GAP SERPL CALCULATED.3IONS-SCNC: 13 MEQ/L (ref 9–15)
AST SERPL-CCNC: 19 U/L (ref 0–35)
ATYPICAL LYMPHOCYTE RELATIVE PERCENT: 1 %
BASOPHILS ABSOLUTE: 0 K/UL (ref 0–0.2)
BASOPHILS RELATIVE PERCENT: 0.7 %
BILIRUB SERPL-MCNC: 1.2 MG/DL (ref 0.2–0.7)
BUN BLDV-MCNC: 10 MG/DL (ref 6–20)
CALCIUM SERPL-MCNC: 10.1 MG/DL (ref 8.5–9.9)
CHLORIDE BLD-SCNC: 105 MEQ/L (ref 95–107)
CO2: 22 MEQ/L (ref 20–31)
CREAT SERPL-MCNC: 0.64 MG/DL (ref 0.5–0.9)
EOSINOPHILS ABSOLUTE: 0.1 K/UL (ref 0–0.7)
EOSINOPHILS RELATIVE PERCENT: 2 %
FOLATE: >20 NG/ML (ref 7.3–26.1)
GFR AFRICAN AMERICAN: >60
GFR NON-AFRICAN AMERICAN: >60
GLOBULIN: 3.6 G/DL (ref 2.3–3.5)
GLUCOSE BLD-MCNC: 75 MG/DL (ref 70–99)
HCT VFR BLD CALC: 35.7 % (ref 37–47)
HEMOGLOBIN: 11.5 G/DL (ref 12–16)
LYMPHOCYTES ABSOLUTE: 0.9 K/UL (ref 1–4.8)
LYMPHOCYTES RELATIVE PERCENT: 21 %
MCH RBC QN AUTO: 31.5 PG (ref 27–31.3)
MCHC RBC AUTO-ENTMCNC: 32.1 % (ref 33–37)
MCV RBC AUTO: 98 FL (ref 82–100)
MONOCYTES ABSOLUTE: 0.5 K/UL (ref 0.2–0.8)
MONOCYTES RELATIVE PERCENT: 12.5 %
NEUTROPHILS ABSOLUTE: 2.5 K/UL (ref 1.4–6.5)
NEUTROPHILS RELATIVE PERCENT: 64 %
PDW BLD-RTO: 13.7 % (ref 11.5–14.5)
PLATELET # BLD: 196 K/UL (ref 130–400)
PLATELET SLIDE REVIEW: NORMAL
POTASSIUM SERPL-SCNC: 3.7 MEQ/L (ref 3.4–4.9)
RBC # BLD: 3.65 M/UL (ref 4.2–5.4)
SODIUM BLD-SCNC: 140 MEQ/L (ref 135–144)
TOTAL PROTEIN: 8.3 G/DL (ref 6.3–8)
VITAMIN B-12: 881 PG/ML (ref 232–1245)
VITAMIN D 25-HYDROXY: 53.1 NG/ML (ref 30–100)
WBC # BLD: 3.9 K/UL (ref 4.8–10.8)

## 2019-12-08 ENCOUNTER — HOSPITAL ENCOUNTER (EMERGENCY)
Age: 44
Discharge: HOME | End: 2019-12-08
Payer: COMMERCIAL

## 2019-12-08 ENCOUNTER — APPOINTMENT (OUTPATIENT)
Dept: GENERAL RADIOLOGY | Age: 44
End: 2019-12-08
Payer: COMMERCIAL

## 2019-12-08 VITALS
TEMPERATURE: 98.7 F | HEART RATE: 92 BPM | HEIGHT: 64 IN | BODY MASS INDEX: 23.05 KG/M2 | SYSTOLIC BLOOD PRESSURE: 101 MMHG | DIASTOLIC BLOOD PRESSURE: 49 MMHG | RESPIRATION RATE: 17 BRPM | OXYGEN SATURATION: 97 % | WEIGHT: 135 LBS

## 2019-12-08 DIAGNOSIS — J06.9 ACUTE UPPER RESPIRATORY INFECTION: Primary | ICD-10-CM

## 2019-12-08 LAB
HCG, URINE, POC: NEGATIVE
INFLUENZA A BY PCR: NEGATIVE
INFLUENZA B BY PCR: NEGATIVE
Lab: NORMAL
NEGATIVE QC PASS/FAIL: NORMAL
POSITIVE QC PASS/FAIL: NORMAL
STREP GRP A PCR: NEGATIVE

## 2019-12-08 PROCEDURE — 87651 STREP A DNA AMP PROBE: CPT

## 2019-12-08 PROCEDURE — 71046 X-RAY EXAM CHEST 2 VIEWS: CPT

## 2019-12-08 PROCEDURE — 99283 EMERGENCY DEPT VISIT LOW MDM: CPT

## 2019-12-08 PROCEDURE — 87502 INFLUENZA DNA AMP PROBE: CPT

## 2019-12-08 RX ORDER — GUAIFENESIN AND DEXTROMETHORPHAN HYDROBROMIDE 600; 30 MG/1; MG/1
1 TABLET, EXTENDED RELEASE ORAL 2 TIMES DAILY
Qty: 10 TABLET | Refills: 0 | Status: SHIPPED | OUTPATIENT
Start: 2019-12-08 | End: 2020-03-09 | Stop reason: ALTCHOICE

## 2019-12-08 ASSESSMENT — PAIN SCALES - GENERAL: PAINLEVEL_OUTOF10: 10

## 2019-12-08 ASSESSMENT — PAIN DESCRIPTION - PAIN TYPE: TYPE: ACUTE PAIN

## 2019-12-08 ASSESSMENT — PAIN DESCRIPTION - DESCRIPTORS: DESCRIPTORS: ACHING

## 2019-12-08 ASSESSMENT — ENCOUNTER SYMPTOMS
COUGH: 1
EYES NEGATIVE: 1
GASTROINTESTINAL NEGATIVE: 1
RHINORRHEA: 1

## 2019-12-08 ASSESSMENT — PAIN DESCRIPTION - LOCATION: LOCATION: ARM;THROAT

## 2019-12-08 ASSESSMENT — PAIN DESCRIPTION - FREQUENCY: FREQUENCY: CONTINUOUS

## 2019-12-10 DIAGNOSIS — B20 HIV INFECTION, UNSPECIFIED SYMPTOM STATUS (HCC): Primary | ICD-10-CM

## 2019-12-13 DIAGNOSIS — B20 HIV INFECTION, UNSPECIFIED SYMPTOM STATUS (HCC): Primary | ICD-10-CM

## 2019-12-18 ENCOUNTER — NURSE ONLY (OUTPATIENT)
Dept: INFECTIOUS DISEASES | Age: 44
End: 2019-12-18

## 2019-12-18 DIAGNOSIS — B20 HUMAN IMMUNODEFICIENCY VIRUS (HCC): Primary | ICD-10-CM

## 2019-12-18 DIAGNOSIS — B20 HIV INFECTION, UNSPECIFIED SYMPTOM STATUS (HCC): ICD-10-CM

## 2019-12-19 ENCOUNTER — NURSE ONLY (OUTPATIENT)
Dept: INFECTIOUS DISEASES | Age: 44
End: 2019-12-19

## 2019-12-19 ENCOUNTER — TELEPHONE (OUTPATIENT)
Dept: INFECTIOUS DISEASES | Age: 44
End: 2019-12-19

## 2019-12-19 DIAGNOSIS — B20 HUMAN IMMUNODEFICIENCY VIRUS (HCC): Primary | ICD-10-CM

## 2019-12-19 DIAGNOSIS — B20 HIV INFECTION, UNSPECIFIED SYMPTOM STATUS (HCC): Primary | ICD-10-CM

## 2019-12-19 LAB
ABSOLUTE CD 4 HELPER: 344 CELLS/UL (ref 430–1800)
CD4 % HELPER T CELL: 19 % (ref 32–64)
LYMPHOCYTE SUBSET PANEL 2 INFO: ABNORMAL
RPR TITER: NORMAL
RPR: REACTIVE

## 2019-12-21 LAB
HIV-1 QNT LOG, IU/ML: NOT DETECTED LOG CPY/ML
HIV-1 QNT, IU/ML: NOT DETECTED CPY/ML
INTERPRETATION: NOT DETECTED
MISCELLANEOUS LAB TEST ORDER: NORMAL
WHOPPER PROMPT: NORMAL

## 2019-12-22 LAB — TREPONEMA PALLIDUM ANTIBODIES: NON REACTIVE

## 2019-12-27 ENCOUNTER — TELEPHONE (OUTPATIENT)
Dept: INFECTIOUS DISEASES | Age: 44
End: 2019-12-27

## 2019-12-31 ENCOUNTER — TELEPHONE (OUTPATIENT)
Dept: INFECTIOUS DISEASES | Age: 44
End: 2019-12-31

## 2019-12-31 ENCOUNTER — HOSPITAL ENCOUNTER (OUTPATIENT)
Dept: GENERAL RADIOLOGY | Age: 44
Discharge: HOME | End: 2020-01-02
Payer: COMMERCIAL

## 2019-12-31 ENCOUNTER — OFFICE VISIT (OUTPATIENT)
Dept: FAMILY MEDICINE CLINIC | Age: 44
End: 2019-12-31
Payer: COMMERCIAL

## 2019-12-31 VITALS
BODY MASS INDEX: 22.53 KG/M2 | OXYGEN SATURATION: 98 % | HEIGHT: 64 IN | SYSTOLIC BLOOD PRESSURE: 118 MMHG | HEART RATE: 69 BPM | TEMPERATURE: 97.2 F | RESPIRATION RATE: 16 BRPM | DIASTOLIC BLOOD PRESSURE: 60 MMHG | WEIGHT: 132 LBS

## 2019-12-31 DIAGNOSIS — J18.9 PNEUMONIA DUE TO INFECTIOUS ORGANISM, UNSPECIFIED LATERALITY, UNSPECIFIED PART OF LUNG: ICD-10-CM

## 2019-12-31 LAB — LACTATE DEHYDROGENASE: 221 U/L (ref 135–214)

## 2019-12-31 PROCEDURE — G8427 DOCREV CUR MEDS BY ELIG CLIN: HCPCS | Performed by: FAMILY MEDICINE

## 2019-12-31 PROCEDURE — 99214 OFFICE O/P EST MOD 30 MIN: CPT | Performed by: FAMILY MEDICINE

## 2019-12-31 PROCEDURE — G8482 FLU IMMUNIZE ORDER/ADMIN: HCPCS | Performed by: FAMILY MEDICINE

## 2019-12-31 PROCEDURE — 71046 X-RAY EXAM CHEST 2 VIEWS: CPT

## 2019-12-31 PROCEDURE — 1036F TOBACCO NON-USER: CPT | Performed by: FAMILY MEDICINE

## 2019-12-31 PROCEDURE — G8420 CALC BMI NORM PARAMETERS: HCPCS | Performed by: FAMILY MEDICINE

## 2019-12-31 RX ORDER — GUAIFENESIN 600 MG/1
600 TABLET, EXTENDED RELEASE ORAL 2 TIMES DAILY
Qty: 30 TABLET | Refills: 0 | Status: SHIPPED | OUTPATIENT
Start: 2019-12-31 | End: 2020-01-15

## 2019-12-31 RX ORDER — AZITHROMYCIN 250 MG/1
TABLET, FILM COATED ORAL
COMMUNITY
Start: 2019-12-18 | End: 2020-03-09 | Stop reason: ALTCHOICE

## 2019-12-31 NOTE — PROGRESS NOTES
[facility], 38 y.o. female presents today with:  Chief Complaint   Patient presents with    Cough     x2 weeks, dry cough    Arm Pain     for a couple of days.  Eye Problem     top of left eye is bothering her            HPI    Cough productive of scant yellow/green sputum for over 2 weeks unresponsive to Zithromax. No URI symptoms. CD4 percent 19. No other questions and or concerns for today's visit      Review of Systems    Reviewed with the patient: all disease processes, current clinical status, medications, activities and diet.      Side effects, adverse effects of the medication prescribed today, as well as treatment plan/ rationale and result expectations have been discussed with the patient who expresses understanding and desires to proceed.     Close follow up to evaluate treatment results and for coordination of care. I have reviewed the patient's medical history in detail and updated the computerized patient record. More than 50% of the appointment was spent in face-to-face counseling, education and care coordination.       Past Medical History:   Diagnosis Date    Behavior disturbance     Encephalopathy 2/10/2019    HIV (human immunodeficiency virus infection) ([facility] 2/25/2019    Infection due to urethral catheter (HCC)     Iron deficiency anemia secondary to inadequate dietary iron intake 7/1/2019    Moderate malnutrition ([address] 2/18/2019    Noncompliance     Obstructed fallopian tubes 10/31/2016    Oligoclonal bands in cerebrospinal fluid 7/1/2019     Past Surgical History:   Procedure Laterality Date    CERVICAL CERCLAGE  10 yrs ago    3 pregnancies & 3 cerclages    HYSTEROSCOPY DIAGNOSTIC N/A 11/14/2016    HYSTEROSCOPY OPERATIVE  LAPAROSCOPY, [place] SPEAKING  performed by Johnny Kruse DO at [facility] Bilateral      Social History     Socioeconomic History    Marital status: Single     Spouse name: Not on file    Number of (PROVERA) 10 MG tablet       JUNEL FE 1/20 1-20 MG-MCG per tablet       terbinafine (LAMISIL) 1 % cream Apply topically 2 times daily. 42 g 2    GENVOYA [phone] MG TABLET TAKE 1 TABLET DAILY  2    citalopram (CELEXA) 10 MG tablet Take 1 tablet by mouth daily 30 tablet 3    azithromycin (ZITHROMAX) 250 MG tablet        No current facility-administered medications for this visit. PMH, Surgical Hx, Family Hx, and Social Hxreviewed and updated. Health Maintenance reviewed. Objective    Vitals:    12/31/19 0949   BP: 118/60   Site: Left Upper Arm   Position: Sitting   Cuff Size: Medium Adult   Pulse: 69   Resp: 16   Temp: 97.2 °F (36.2 °C)   TempSrc: Temporal   SpO2: 98%   Weight: 132 lb (59.9 kg)   Height: 5' 4\" (1.626 m)        Physical Exam  Constitutional:       General: She is not in acute distress. Appearance: She is well-developed. HENT:      Head: Normocephalic and atraumatic. Right Ear: Tympanic membrane, ear canal and external ear normal.      Left Ear: Tympanic membrane, ear canal and external ear normal.      Nose: Nose normal.      Mouth/Throat:      Mouth: Mucous membranes are moist.      Pharynx: Oropharynx is clear. No oropharyngeal exudate or posterior oropharyngeal erythema. Eyes:      General: Lids are normal. No scleral icterus. Extraocular Movements: Extraocular movements intact. Conjunctiva/sclera: Conjunctivae normal.      Right eye: Right conjunctiva is not injected. Left eye: Left conjunctiva is not injected. Neck:      Musculoskeletal: Normal range of motion and neck supple. Thyroid: No thyromegaly. Cardiovascular:      Rate and Rhythm: Normal rate and regular rhythm. Heart sounds: Normal heart sounds. Pulmonary:      Effort: Pulmonary effort is normal. No respiratory distress. Breath sounds: Normal breath sounds. No wheezing or rales (slight, occasional crackle superior LLL).    Lymphadenopathy:      Cervical: No cervical Lactate Dehydrogenase [[id number] Custom]   - Future Order    Respiratory Culture [[id number] Custom]      guaiFENesin (MUCINEX) 600 MG extended release tablet [[id number]]           ORDERS WITHOUT AN ASSOCIATED DIAGNOSIS    azithromycin (ZITHROMAX) 250 MG tablet [[id number]]              Reviewed with the patient: all disease processes, current clinical status, medications, activities and diet.      Side effects, adverse effects of the medication prescribed today, as well as treatment plan/ rationale and result expectations have been discussed with the patient who expresses understanding and desires to proceed.     Close follow up to evaluate treatment results and for coordination of care. I have reviewed the patient's medical history in detail and updated the computerized patient record. More than 50% of the appointment was spent in face-to-face counseling, education and care coordination. Please note this report has been partially produced using speech recognition software and may contain mistakes related to that system including errors in grammar, punctuation and spelling as well as words and phrases that may seem inappropriate. If there are questions or concerns, please feel free to contact me to clarify. Orders Placed This Encounter   Procedures    Respiratory Culture    XR CHEST STANDARD (2 VW)     Order Specific Question:   Reason for exam:     Answer:   cough x 2 weeks, HIV with low CD4, failed zmax    Lactate Dehydrogenase     Standing Status:   Future     Number of Occurrences:   1     Standing Expiration Date:   12/31/2020     Orders Placed This Encounter   Medications    guaiFENesin (MUCINEX) 600 MG extended release tablet     Sig: Take 1 tablet by mouth 2 times daily for 15 days     Dispense:  30 tablet     Refill:  0     There are no discontinued medications. No follow-ups on file.         Controlled Substance Monitoring:    Acute and Chronic Pain Monitoring:   RX Monitoring 1/2/2018   Attestation The Prescription Monitoring Report for this patient was reviewed today.            Jair Maharaj MD

## 2019-12-31 NOTE — LETTER
[facility]  [address] [facility]  Phone: [phone]  Fax: [phone]      January 10, 2020    Blu Phelan  [facility]. [address] [place] [address]      Dear Usman Ayala recently had labs ordered by Dr. Megan Hallman MD performed on 12/31/2019. We have been unable to contact you by telephone to discuss the results of these tests. Please contact our office to discuss these results.           Sincerely,    [facility]

## 2020-01-02 LAB
CULTURE, RESPIRATORY: NORMAL
GRAM STAIN RESULT: NORMAL

## 2020-01-03 ENCOUNTER — OFFICE VISIT (OUTPATIENT)
Dept: INFECTIOUS DISEASES | Age: 45
End: 2020-01-03
Payer: COMMERCIAL

## 2020-01-03 ENCOUNTER — NURSE ONLY (OUTPATIENT)
Dept: INFECTIOUS DISEASES | Age: 45
End: 2020-01-03

## 2020-01-03 VITALS
SYSTOLIC BLOOD PRESSURE: 95 MMHG | RESPIRATION RATE: 16 BRPM | HEIGHT: 64 IN | TEMPERATURE: 97.9 F | HEART RATE: 83 BPM | DIASTOLIC BLOOD PRESSURE: 61 MMHG | BODY MASS INDEX: 22.2 KG/M2 | WEIGHT: 130 LBS

## 2020-01-03 PROCEDURE — G8482 FLU IMMUNIZE ORDER/ADMIN: HCPCS | Performed by: INTERNAL MEDICINE

## 2020-01-03 PROCEDURE — G8427 DOCREV CUR MEDS BY ELIG CLIN: HCPCS | Performed by: INTERNAL MEDICINE

## 2020-01-03 PROCEDURE — G8420 CALC BMI NORM PARAMETERS: HCPCS | Performed by: INTERNAL MEDICINE

## 2020-01-03 PROCEDURE — 1036F TOBACCO NON-USER: CPT | Performed by: INTERNAL MEDICINE

## 2020-01-03 PROCEDURE — 99214 OFFICE O/P EST MOD 30 MIN: CPT | Performed by: INTERNAL MEDICINE

## 2020-01-03 NOTE — PROGRESS NOTES
Kamala Sage  1975  female  Medical Record Number: [id number]    Infectious Disease Progress Note    Patient is a followup regarding URI in the setting of hx of HIV. I was called by her NP regarding symptoms of \"green sputum\" and concern for opportunistic infection  Patient was treated with Z pack and her symptoms persisted. There was concern for possible pneumonia so CXR was done 12/31 - negative for CP process. Sputum cx mixed alma and some GNRs    CD4 344 and CD4% 19  RPR in December positive - need repeat quant in a few weeks. She was just treated approx a week ago. Appears to be doing ok. Has rhinorrhea, some congestion and intermittent hoarseness of the voice but overall is laughing and joking. No conversational dyspnea. All 14 review of systems discussed and negative other than as stated as above. General: Patient in no acute distress, cooperative  transillumination of sinuses: patent, no pain to touch of frontal or maxillary sinuses. + rhinorrhea. Skin: no new rashes   HEENT:  EOMI, MMM, Neck is supple  Heart: S1 S2  Lungs: clear bilaterally, no wheezing. Abdomen: soft, ND, +BS, NTTP  Extrem:+pulses, no calf pain  Neuro exam: CN II-XII intact    Imaging and labs were reviewed per medical records and any ID pertinent labs were addressed with the patient. Lab Results   Component Value Date    WBC 3.9 (L) 12/06/2019    HGB 11.5 (L) 12/06/2019    HCT 35.7 (L) 12/06/2019    MCV 98.0 12/06/2019     12/06/2019         Assessment:  HX of URI x 2 weeks. Likely viral.   Syphilis - FTA antibodies negative but RPR was positive. Titer low. S/p treatment. Hx of brain lesions - deferred to neuro. Vasculitis? Maybe repeat imaging in 6 months to a year. HIV/AIDS - Genvoya, 100% compliance. Plan:  Symptomatic treatment - discussed OTC options.    If she develops progressive fevers, chills, or worsening of clinical symptoms, she is to call me.   genvoya  Psych follow up  Neuro follow up  Follow up in 3-6 month.        Melissa Glover D.O.

## 2020-01-21 ENCOUNTER — TELEPHONE (OUTPATIENT)
Dept: INFECTIOUS DISEASES | Age: 45
End: 2020-01-21

## 2020-01-21 NOTE — TELEPHONE ENCOUNTER
Pt presented in office, per request, CM met with pt. Pt needs med refill and request letter to be written on her behalf regarding the necessity of her pet to be allowed in dwelling as she is under [address]. Cm informed Nurse Dearl Harsh of pt's needs, as this CM will be out of the office 1/22/20, when Dr. Savage Burks is available to authorize letter and prescriptions will need to be called into pharmacy by med staff. CM provided translation to facilitte communication between Nurse and Pt. CM will follow up with pt as needed.

## 2020-01-29 NOTE — TELEPHONE ENCOUNTER
Per request, CM scheduled transport for MRI 5/23/19 @ 8:30 AM.  CM assessed need, made arrangements as last resort. declines

## 2020-02-10 ENCOUNTER — OFFICE VISIT (OUTPATIENT)
Dept: FAMILY MEDICINE CLINIC | Age: 45
End: 2020-02-10
Payer: COMMERCIAL

## 2020-02-10 VITALS
HEIGHT: 64 IN | BODY MASS INDEX: 21.51 KG/M2 | DIASTOLIC BLOOD PRESSURE: 62 MMHG | TEMPERATURE: 97.7 F | SYSTOLIC BLOOD PRESSURE: 98 MMHG | RESPIRATION RATE: 16 BRPM | OXYGEN SATURATION: 96 % | WEIGHT: 126 LBS | HEART RATE: 75 BPM

## 2020-02-10 PROBLEM — R09.82 PND (POST-NASAL DRIP): Chronic | Status: ACTIVE | Noted: 2020-02-10

## 2020-02-10 PROBLEM — F41.9 ANXIETY: Chronic | Status: ACTIVE | Noted: 2020-02-10

## 2020-02-10 PROCEDURE — G8482 FLU IMMUNIZE ORDER/ADMIN: HCPCS | Performed by: FAMILY MEDICINE

## 2020-02-10 PROCEDURE — 99214 OFFICE O/P EST MOD 30 MIN: CPT | Performed by: FAMILY MEDICINE

## 2020-02-10 PROCEDURE — 1036F TOBACCO NON-USER: CPT | Performed by: FAMILY MEDICINE

## 2020-02-10 PROCEDURE — G8427 DOCREV CUR MEDS BY ELIG CLIN: HCPCS | Performed by: FAMILY MEDICINE

## 2020-02-10 PROCEDURE — G8420 CALC BMI NORM PARAMETERS: HCPCS | Performed by: FAMILY MEDICINE

## 2020-02-10 RX ORDER — GUAIFENESIN 600 MG/1
600 TABLET, EXTENDED RELEASE ORAL 2 TIMES DAILY
Qty: 30 TABLET | Refills: 5 | Status: SHIPPED | OUTPATIENT
Start: 2020-02-10 | End: 2020-03-09 | Stop reason: ALTCHOICE

## 2020-02-10 RX ORDER — CITALOPRAM 20 MG/1
20 TABLET ORAL DAILY
Qty: 30 TABLET | Refills: 6 | Status: SHIPPED | OUTPATIENT
Start: 2020-02-10 | End: 2020-06-05 | Stop reason: SDUPTHER

## 2020-02-10 NOTE — PROGRESS NOTES
[address] [facility], 38 y.o. female presents today with:  Chief Complaint   Patient presents with    Discuss Medications     Patient want to discuss Celexa medication and her dog  paper work.  Numbness     Patient having numbness on her left side of face and left arm. HPI    Has been without citalopram for a while. Says she has been taking it all  Along but she was given 4 months' worth in 02/2019. Now complains of severe anxiety and depression. Says she feels explosive and irritable. Has hope. No SI/HI. Her dog is the only thing that makes her happy and keeps her active. Is very anxious. Had mild numbness in left side of face and around left side of lips. IS tearful and self-isolating. Sleep is delayed and interrupted. No other questions and or concerns for today's visit      Review of Systems  No fevers, chills, sweats. No unintended weight loss. No abdominal pain, nausea, vomiting, diarrhea, constipation, bloody stools, black tarry stools. No rashes. No swollen glands. Persistent cough with PND.       Past Medical History:   Diagnosis Date    Behavior disturbance     Encephalopathy 2/10/2019    HIV (human immunodeficiency virus infection) ([facility] [address] 2/25/2019    Infection due to urethral catheter (HCC)     Iron deficiency anemia secondary to inadequate dietary iron intake 7/1/2019    Moderate malnutrition ([address] 2/18/2019    Noncompliance     Obstructed fallopian tubes 10/31/2016    Oligoclonal bands in cerebrospinal fluid 7/1/2019     Past Surgical History:   Procedure Laterality Date    CERVICAL CERCLAGE  10 yrs ago    3 pregnancies & 3 cerclages    HYSTEROSCOPY DIAGNOSTIC N/A 11/14/2016    HYSTEROSCOPY OPERATIVE  LAPAROSCOPY, Sudanese SPEAKING  performed by Robert Adkins DO at [facility] Bilateral      Social History     Socioeconomic History    Marital status: Single     Spouse name: Not on file    Number of children: Not on file     JUNEL FE 1/20 1-20 MG-MCG per tablet       terbinafine (LAMISIL) 1 % cream Apply topically 2 times daily. 42 g 2    GENVOYA [phone] MG TABLET TAKE 1 TABLET DAILY  2    Dextromethorphan-guaiFENesin (MUCINEX DM)  MG TB12 Take 1 tablet by mouth 2 times daily 10 tablet 0     No current facility-administered medications for this visit. PMH, Surgical Hx, Family Hx, and Social Hxreviewed and updated. Health Maintenance reviewed. Objective    Vitals:    02/10/20 1001   BP: 98/62   Site: Left Upper Arm   Position: Sitting   Cuff Size: Medium Adult   Pulse: 75   Resp: 16   Temp: 97.7 °F (36.5 °C)   TempSrc: Tympanic   SpO2: 96%   Weight: 126 lb (57.2 kg)   Height: 5' 4\" (1.626 m)        Physical Exam  Constitutional:       Appearance: She is well-developed. HENT:      Head: Normocephalic and atraumatic. Eyes:      General: No scleral icterus. Conjunctiva/sclera: Conjunctivae normal.   Neck:      Musculoskeletal: Neck supple. Thyroid: No thyromegaly. Vascular: No carotid bruit. Cardiovascular:      Rate and Rhythm: Normal rate and regular rhythm. Heart sounds: Normal heart sounds, S1 normal and S2 normal.   Pulmonary:      Effort: Pulmonary effort is normal.      Breath sounds: Normal breath sounds. No wheezing or rales. Abdominal:      General: Bowel sounds are normal. There is no distension. Palpations: Abdomen is soft. There is no mass. Tenderness: There is no abdominal tenderness. Lymphadenopathy:      Cervical: No cervical adenopathy. Skin:     General: Skin is warm and dry. Neurological:      Mental Status: She is alert and oriented to person, place, and time. Cranial Nerves: Cranial nerves are intact. Motor: Motor function is intact. Coordination: Coordination is intact. Psychiatric:         Attention and Perception: Attention normal.         Mood and Affect: Mood is anxious. Speech: Speech is rapid and pressured. Behavior: Behavior normal. Behavior is cooperative. Thought Content: Thought content normal.         Cognition and Memory: Memory normal.         Judgment: Judgment normal.           Lab Results   Component Value Date    LABA1C 5.1 07/24/2019    LABA1C 5.1 07/10/2019     Lab Results   Component Value Date    CREATININE 0.64 12/06/2019     Lab Results   Component Value Date    ALT 12 12/06/2019    AST 19 12/06/2019     Lab Results   Component Value Date    CHOL 233 (H) 07/10/2019    TRIG 88 07/10/2019    HDL 61 (H) 07/24/2019    LDLCALC 144 (H) 07/24/2019        Assessment & Plan   Visit Diagnoses and Associated Orders     Major depressive disorder with single episode, in full remission ([facility] [address]    -  Primary    Worse, poor condition. Restart citalopram at 20 mg daily. NOT PRN. OPatient understands. Emotion support animal letter written    citalopram (CELEXA) 20 MG tablet [21062]           Anxiety        See above. COnsider buspar and [facility]. citalopram (CELEXA) 20 MG tablet [21062]           PND (post-nasal drip)        followed by ID. Mucinex prn.    guaiFENesin (MUCINEX) 600 MG extended release tablet [[id number]]                   Reviewed with the patient: all disease processes, current clinical status, medications, activities and diet.      Side effects, adverse effects of the medication prescribed today, as well as treatment plan/ rationale and result expectations have been discussed with the patient who expresses understanding and desires to proceed.     Close follow up to evaluate treatment results and for coordination of care. I have reviewed the patient's medical history in detail and updated the computerized patient record. More than 50% of the appointment was spent in face-to-face counseling, education and care coordination. No fevers, chills, sweats. No unintended weight loss. No abdominal pain, nausea, vomiting, diarrhea, constipation, bloody stools, black tarry stools. No rashes.   No swollen

## 2020-02-18 ENCOUNTER — TELEPHONE (OUTPATIENT)
Dept: INFECTIOUS DISEASES | Age: 45
End: 2020-02-18

## 2020-02-26 ENCOUNTER — NURSE ONLY (OUTPATIENT)
Dept: INFECTIOUS DISEASES | Age: 45
End: 2020-02-26

## 2020-02-26 ENCOUNTER — OFFICE VISIT (OUTPATIENT)
Dept: INFECTIOUS DISEASES | Age: 45
End: 2020-02-26
Payer: COMMERCIAL

## 2020-02-26 VITALS
BODY MASS INDEX: 21.65 KG/M2 | RESPIRATION RATE: 16 BRPM | WEIGHT: 126.8 LBS | DIASTOLIC BLOOD PRESSURE: 62 MMHG | TEMPERATURE: 98.3 F | HEART RATE: 75 BPM | HEIGHT: 64 IN | SYSTOLIC BLOOD PRESSURE: 94 MMHG

## 2020-02-26 DIAGNOSIS — B20 HUMAN IMMUNODEFICIENCY VIRUS (HCC): ICD-10-CM

## 2020-02-26 DIAGNOSIS — R30.0 DYSURIA: ICD-10-CM

## 2020-02-26 DIAGNOSIS — N92.6 MISSED PERIOD: ICD-10-CM

## 2020-02-26 LAB
BILIRUBIN URINE: NEGATIVE
BLOOD, URINE: NEGATIVE
CLARITY: CLEAR
COLOR: YELLOW
GLUCOSE URINE: NEGATIVE MG/DL
KETONES, URINE: NEGATIVE MG/DL
LEUKOCYTE ESTERASE, URINE: NEGATIVE
NITRITE, URINE: NEGATIVE
PH UA: 6 (ref 5–9)
PROTEIN UA: NEGATIVE MG/DL
REASON FOR REJECTION: NORMAL
REJECTED TEST: NORMAL
SPECIFIC GRAVITY UA: 1.02 (ref 1–1.03)
URINE REFLEX TO CULTURE: NORMAL
UROBILINOGEN, URINE: 1 E.U./DL

## 2020-02-26 PROCEDURE — 1036F TOBACCO NON-USER: CPT | Performed by: INTERNAL MEDICINE

## 2020-02-26 PROCEDURE — G8482 FLU IMMUNIZE ORDER/ADMIN: HCPCS | Performed by: INTERNAL MEDICINE

## 2020-02-26 PROCEDURE — 99214 OFFICE O/P EST MOD 30 MIN: CPT | Performed by: INTERNAL MEDICINE

## 2020-02-26 PROCEDURE — G8427 DOCREV CUR MEDS BY ELIG CLIN: HCPCS | Performed by: INTERNAL MEDICINE

## 2020-02-26 PROCEDURE — G8420 CALC BMI NORM PARAMETERS: HCPCS | Performed by: INTERNAL MEDICINE

## 2020-02-26 NOTE — PROGRESS NOTES
Re-Assessment      Patient ID:   Rishi Babin  Date:   2/26/2020      Client ID:  [id number]    [address] [facility] [address] [place] [address]  [phone] (home)     Family/House:    [] Partner  [] Children  [] Other -     Mental Health:    Anxiety/Depression    Substance Abuse:   Marijuana Use  Social History     Socioeconomic History    Marital status: Single     Spouse name: Not on file    Number of children: Not on file    Years of education: Not on file    Highest education level: Not on file   Occupational History    Not on file   Social Needs    Financial resource strain: Not on file    Food insecurity:     Worry: Not on file     Inability: Not on file    Transportation needs:     Medical: Not on file     Non-medical: Not on file   Tobacco Use    Smoking status: Never Smoker    Smokeless tobacco: Never Used   Substance and Sexual Activity    Alcohol use: No     Comment: occasional    Drug use: No    Sexual activity: Not on file   Lifestyle    Physical activity:     Days per week: Not on file     Minutes per session: Not on file    Stress: Not on file   Relationships    Social connections:     Talks on phone: Not on file     Gets together: Not on file     Attends Confucianism service: Not on file     Active member of club or organization: Not on file     Attends meetings of clubs or organizations: Not on file     Relationship status: Not on file    Intimate partner violence:     Fear of current or ex partner: Not on file     Emotionally abused: Not on file     Physically abused: Not on file     Forced sexual activity: Not on file   Other Topics Concern    Not on file   Social History Narrative    Not on file       Housing:   with family    Health/Healthcare:  HIV Stable  Physician Visit:  Dr. Kevin Mensah- 2/26/20  Dental Visit:  [facility]- \"3 months ago\"    CD4:   Lab Results   Component Value Date    LABABSO 344 12/18/2019       Viral Load:  Lab Results   Component Value Date    [id number] Not Detected 12/18/2019       Medical Insurance:  Payor: Juanitawilfrido Dow / Plan: Jasvir Oms / Product Type: *No Product type* /    OHDAP/HIPSCA:  NA   Renewal Date:  NA    Income:    Child Support    Legal Issues:    NA    Emergency Contact:   Extended Emergency Contact Information  Primary Emergency Contact: Vinicius Mcgarry  Address: [address], [address] [place] [address] [address] [facility] Phone: [phone]  Mobile Phone: [phone]  Relation: Other  Secondary Emergency Contact: Kristin Alas [facility] [address] [facility] Phone: [phone]  Relation: Child  Pt presented for follow up with Dr. Tucker Amaya. CM met with pt, completed Annual Review. Cm updated RW Part A Eligibility for services. CM reviewed Grievance Policy, Sliding Scale Fee and Transportation Policy with pt. CM completed PSA and SA/MH Assessments. Pt experiences high anxity and depression, feel no need for counseling referral.  Pt does smoke marijuana, drinks alcohol occasionally. CM updated ISP, pt signed in agreement with POC. Pt remains compliant with care. Last dental visit was 3 months ago, per pt. Pt is sexually active. CM provided information regarading U=U, undetectable equals un-transmittable. CM provided socialization. Pt has not yet scheduled counseling appointment for daughter. Pt has been trying to work with Child Support Services in [address] [place] [address] . Pt has been having to travel to [address] [place] [address]  often. Cm provided food and gas vouchers, need assessed. CM will follow up wit pt as needed.

## 2020-02-26 NOTE — PROGRESS NOTES
Michael Valladares  1975  female  Medical Record Number: [id number]    Infectious Disease Progress Note    Patient is a followup for HIV  During last visit, she had an URI, now resolved. Complains of headaches L sided and intermittent over the past month. Previous MRI reviewed with patient. Last note from [facility] in 9/2019 reviewed. No follow up with neurology since then. Also complains of breast pain bilaterally and frequent urination with bloating. States that she missed her last two periods. Her partner is accompanying her - they have questions. Last RPR discussed. Serofast? Early infection? Her T pallidum antibodies were negative. Repeat titer and antibody. All 14 review of systems discussed and negative other than as stated as above. General: Patient in no acute distress, cooperative  Skin: no new rashes   HEENT:  EOMI, MMM, Neck is supple  Heart: S1 S2  Lungs: clear bilaterally, no wheezing. Abdomen: soft, ND, +BS, NTTP  Extrem:+pulses, no calf pain  Neuro exam: CN II-XII intact    Imaging and labs were reviewed per medical records and any ID pertinent labs were addressed with the patient. Lab Results   Component Value Date    WBC 3.9 (L) 12/06/2019    HGB 11.5 (L) 12/06/2019    HCT 35.7 (L) 12/06/2019    MCV 98.0 12/06/2019     12/06/2019         Assessment:  HX of URI during last visit - ok now  Hx of brain lesions - deferred to neuro. Vasculitis? Possibly repeat imaging in 6 months to a year. HIV/AIDS - Genvoya, 100% compliance. Missed period with breast tenderness and urinary frequency  Headaches    Plan:  Serum pregnancy test  UA/UC  Refer to neurology regarding headaches  genvoya  Repeat RPR and if positive then will repeat TP antibodies. If negative antibodies may be serofast in HIV and will monitor titer. Patient and partner deny any new sexual contacts.    Psych follow up  Neuro follow up  Follow up in 6 month    Melissa Glover D.O.

## 2020-02-26 NOTE — PROGRESS NOTES
Pt doing well. Routine appt. Partner at side. Met with CM  Will do UA and preg. Test as she states no period for 2 months. Discussed referral to Dr Andrew Burgess per Dr Dunia Maurice for increased c/o headaches. Verbalized understanding.      Will call with issues or questions,

## 2020-02-28 DIAGNOSIS — B20 HIV INFECTION, UNSPECIFIED SYMPTOM STATUS (HCC): ICD-10-CM

## 2020-02-28 LAB — GONADOTROPIN, CHORIONIC (HCG) QUANT: <0.1 MIU/ML

## 2020-03-01 LAB — RPR TITER: ABNORMAL

## 2020-03-04 ENCOUNTER — TELEPHONE (OUTPATIENT)
Dept: INFECTIOUS DISEASES | Age: 45
End: 2020-03-04

## 2020-03-04 NOTE — TELEPHONE ENCOUNTER
Call placed to pt/partner    Reviewed labs with Dr Scottie Linder- per  pt does not need lab today. Recheck in 3 weeks. spoke with pt regarding Dr dickinson. Scheduled 3.25.2020 at [phone] for RPR verbalized awareness of appt change and denies issues.

## 2020-03-09 ENCOUNTER — OFFICE VISIT (OUTPATIENT)
Dept: FAMILY MEDICINE CLINIC | Age: 45
End: 2020-03-09
Payer: COMMERCIAL

## 2020-03-09 VITALS
RESPIRATION RATE: 16 BRPM | WEIGHT: 126 LBS | SYSTOLIC BLOOD PRESSURE: 110 MMHG | HEART RATE: 78 BPM | TEMPERATURE: 97 F | BODY MASS INDEX: 21.51 KG/M2 | HEIGHT: 64 IN | OXYGEN SATURATION: 98 % | DIASTOLIC BLOOD PRESSURE: 66 MMHG

## 2020-03-09 DIAGNOSIS — R10.2 PELVIC PAIN: ICD-10-CM

## 2020-03-09 PROBLEM — N92.6 MENSTRUAL ABNORMALITY: Chronic | Status: ACTIVE | Noted: 2020-03-09

## 2020-03-09 LAB
BILIRUBIN, POC: NORMAL
BLOOD URINE, POC: NORMAL
CLARITY, POC: NORMAL
COLOR, POC: NORMAL
CONTROL: NORMAL
GLUCOSE URINE, POC: NORMAL
KETONES, POC: NORMAL
LEUKOCYTE EST, POC: NORMAL
NITRITE, POC: NORMAL
PH, POC: 6
PREGNANCY TEST URINE, POC: NORMAL
PROTEIN, POC: NORMAL
SPECIFIC GRAVITY, POC: 1.02
UROBILINOGEN, POC: NORMAL

## 2020-03-09 PROCEDURE — 81025 URINE PREGNANCY TEST: CPT | Performed by: FAMILY MEDICINE

## 2020-03-09 PROCEDURE — G8420 CALC BMI NORM PARAMETERS: HCPCS | Performed by: FAMILY MEDICINE

## 2020-03-09 PROCEDURE — 99214 OFFICE O/P EST MOD 30 MIN: CPT | Performed by: FAMILY MEDICINE

## 2020-03-09 PROCEDURE — G8482 FLU IMMUNIZE ORDER/ADMIN: HCPCS | Performed by: FAMILY MEDICINE

## 2020-03-09 PROCEDURE — G8427 DOCREV CUR MEDS BY ELIG CLIN: HCPCS | Performed by: FAMILY MEDICINE

## 2020-03-09 PROCEDURE — 1036F TOBACCO NON-USER: CPT | Performed by: FAMILY MEDICINE

## 2020-03-09 PROCEDURE — 81002 URINALYSIS NONAUTO W/O SCOPE: CPT | Performed by: FAMILY MEDICINE

## 2020-03-09 NOTE — PROGRESS NOTES
[address] [facility], 38 y.o. female presents today with:  Chief Complaint   Patient presents with    Depression     4 week follow up.  Amenorrhea     States she is17 days. she has done a urine and blood test came back negative. She has a lot of pain more on the left side. She has been consulting with OB-GYN and is now on Birth control. She has an appointment with the surgeon next month for surgery on her overies. At this point she wants to know exactly what is wrong with her. She see Dr. Chinyere Whalen on [facility], and Dr. Pedro Hong           HPI    Patient is here for follow-up of depression and anxiety. At the last visit she was very anxious and she had rapid and pressured speech. She acknowledged she had been taking her citalopram only as needed. It was represcribed and proper administration was reinforced. She states that she continues to be very anxious primarily because of the pelvic pain which is described below. Complains of LLQ pain and lower pelvic pain  X 5 week. Pain is sharp and burning and lasts up to 20 minutes. Worse when she sits down and when she lies down. LMP 1/23/2020. Stopped taking OCPs but did take five days of Provera leftover from previos prescription. .  Flow has been irregular. Was told by her GYN that the pain was related to OCPs which the GYN changed. Pain has persisted. Has had \"cleaning or ovaries\" in 2010 by Dr. Deo Espinoza. No abnormal discharge. No fever. No other questions and or concerns for today's visit      Review of Systems  No fevers, chills, sweats. No unintended weight loss. No abdominal pain, nausea, vomiting, diarrhea, constipation, bloody stools, black tarry stools. No rashes. No swollen glands. No dysuria. Has breast pain.        Past Medical History:   Diagnosis Date    Behavior disturbance     Encephalopathy 2/10/2019    HIV (human immunodeficiency virus infection) ([facility] [address] 2/25/2019    Infection due to urethral catheter (HCC)     Iron Pressure Mother     Stroke Mother     Diabetes Mother     No Known Problems Father      Allergies   Allergen Reactions    Aspirin     Metronidazole Swelling    Sulfa Antibiotics Swelling     Current Outpatient Medications   Medication Sig Dispense Refill    Probiotic Product (ACIDOPHILUS/BIFIDUS PO) Take by mouth      citalopram (CELEXA) 20 MG tablet Take 1 tablet by mouth daily 30 tablet 6    NATURAL VITAMIN D-3 125 MCG (5000 UT) TABS tablet TAKE 1 TABLET BY MOUTH EVERY DAY  11    dapsone 100 MG tablet       terbinafine (LAMISIL) 1 % cream Apply topically 2 times daily. 42 g 2    GENVOYA [id number] MG TABLET TAKE 1 TABLET DAILY  2     No current facility-administered medications for this visit. PMH, Surgical Hx, Family Hx, and Social Hxreviewed and updated. Health Maintenance reviewed. Objective    Vitals:    03/09/20 1022   BP: 110/66   Site: Left Upper Arm   Position: Sitting   Cuff Size: Medium Adult   Pulse: 78   Resp: 16   Temp: 97 °F (36.1 °C)   TempSrc: Temporal   SpO2: 98%   Weight: 126 lb (57.2 kg)   Height: 5' 4\" (1.626 m)        Physical Exam  Constitutional:       Appearance: She is well-developed. HENT:      Head: Normocephalic. Eyes:      Conjunctiva/sclera: Conjunctivae normal.   Pulmonary:      Effort: Pulmonary effort is normal.   Genitourinary:     Comments: Normal external genitalia. Bimanual exam. Normal and nontender right adnexa and uterine fundus. Left adnexal tenderness. Left ovary nonpalpable. No cervical motion tenderness. No vaginal discharge. Neurological:      Mental Status: She is alert and oriented to person, place, and time. Psychiatric:         Mood and Affect: Mood is anxious. Speech: Speech is rapid and pressured. Behavior: Behavior normal.         Thought Content:  Thought content normal.         Judgment: Judgment normal.           Lab Results   Component Value Date    LABA1C 5.1 07/24/2019    LABA1C 5.1 07/10/2019     Lab Results   Component Value Date    CREATININE 0.64 12/06/2019     Lab Results   Component Value Date    ALT 12 12/06/2019    AST 19 12/06/2019     Lab Results   Component Value Date    CHOL 233 (H) 07/10/2019    TRIG 88 07/10/2019    HDL 61 (H) 07/24/2019    LDLCALC 144 (H) 07/24/2019        Assessment & Plan   Visit Diagnoses and Associated Orders     Pelvic pain    -  Primary    POCT urine pregnancy [[id number] Custom]      US PELVIS COMPLETE [[id number] Custom]   - Future Order    POCT Urinalysis no Micro [[id number] Custom]      US NON OB TRANSVAGINAL [[id number] Custom]   - Future Order    2018 [employer] Ngocene Oar, DO, [place], [place] [[id number] Custom]      C.trachomatis N.gonorrhoeae DNA, Urine [[id number] Custom]   - Future Order         Menstrual abnormality        POCT urine pregnancy [[id number] Custom]      US NON OB TRANSVAGINAL [[id number] Custom]   - Future Order    2018 [facility] [employer], [employer], DO, [place], [place] [[id number] Custom]           ORDERS WITHOUT AN ASSOCIATED DIAGNOSIS    Probiotic Product (ACIDOPHILUS/BIFIDUS PO) [71321]        Patient very upset about menstrual irregularity and pelvic pain. Ultrasound and GYN referral ordered. Recommended patient restart new pack of OCPs. Continue with safe sex practices using condoms every episode. Anxiety related to this current medical condition does not necessitate med change but rather close follow-up. Reviewed with the patient: all disease processes, current clinical status, medications, activities and diet.      Side effects, adverse effects of the medication prescribed today, as well as treatment plan/ rationale and result expectations have been discussed with the patient who expresses understanding and desires to proceed.     Close follow up to evaluate treatment results and for coordination of care. I have reviewed the patient's medical history in detail and updated the computerized patient record.     More than 50% of the appointment was spent in face-to-face counseling, education and care coordination. Please note this report has been partially produced using speech recognition software and may contain mistakes related to that system including errors in grammar, punctuation and spelling as well as words and phrases that may seem inappropriate. If there are questions or concerns, please feel free to contact me to clarify. Orders Placed This Encounter   Procedures    C.trachomatis N.gonorrhoeae DNA, Urine     Standing Status:   Future     Standing Expiration Date:   3/9/2021    US PELVIS COMPLETE     Standing Status:   Future     Standing Expiration Date:   3/9/2021     Order Specific Question:   Reason for exam:     Answer:   left pelvic pain    US NON OB TRANSVAGINAL     Standing Status:   Future     Standing Expiration Date:   3/9/2021     Order Specific Question:   Reason for exam:     Answer:   left pelvic pain   [address] Mariola Flores DO, [facility]     Referral Priority:   Routine     Referral Type:   Eval and Treat     Referral Reason:   Specialty Services Required     Referred to Provider:   Cruz Zafar DO     Requested Specialty:   Obstetrics & Gynecology     Number of Visits Requested:   1    POCT urine pregnancy    POCT Urinalysis no Micro     No orders of the defined types were placed in this encounter. Medications Discontinued During This Encounter   Medication Reason    guaiFENesin (MUCINEX) 600 MG extended release tablet Therapy completed    azithromycin (ZITHROMAX) 250 MG tablet Therapy completed    Dextromethorphan-guaiFENesin (MUCINEX DM)  MG TB12 Therapy completed    JUNEL FE 1/20 1-20 MG-MCG per tablet Therapy completed    medroxyPROGESTERone (PROVERA) 10 MG tablet Therapy completed     No follow-ups on file. Controlled Substance Monitoring:    Acute and Chronic Pain Monitoring:   RX Monitoring 1/2/2018   Attestation The Prescription Monitoring Report for this patient was reviewed today.            Lynn GUERIN, MD

## 2020-03-10 ENCOUNTER — HOSPITAL ENCOUNTER (OUTPATIENT)
Dept: ULTRASOUND IMAGING | Age: 45
Discharge: HOME | End: 2020-03-12
Payer: COMMERCIAL

## 2020-03-10 PROCEDURE — 76856 US EXAM PELVIC COMPLETE: CPT

## 2020-03-10 PROCEDURE — 76830 TRANSVAGINAL US NON-OB: CPT

## 2020-03-13 LAB
C. TRACHOMATIS DNA ,URINE: NEGATIVE
N. GONORRHOEAE DNA, URINE: NEGATIVE

## 2020-03-24 ENCOUNTER — OFFICE VISIT (OUTPATIENT)
Dept: OBGYN CLINIC | Age: 45
End: 2020-03-24
Payer: COMMERCIAL

## 2020-03-24 VITALS — SYSTOLIC BLOOD PRESSURE: 108 MMHG | DIASTOLIC BLOOD PRESSURE: 62 MMHG | WEIGHT: 136 LBS | BODY MASS INDEX: 23.34 KG/M2

## 2020-03-24 PROCEDURE — G8482 FLU IMMUNIZE ORDER/ADMIN: HCPCS | Performed by: OBSTETRICS & GYNECOLOGY

## 2020-03-24 PROCEDURE — 1036F TOBACCO NON-USER: CPT | Performed by: OBSTETRICS & GYNECOLOGY

## 2020-03-24 PROCEDURE — 99204 OFFICE O/P NEW MOD 45 MIN: CPT | Performed by: OBSTETRICS & GYNECOLOGY

## 2020-03-24 PROCEDURE — G8427 DOCREV CUR MEDS BY ELIG CLIN: HCPCS | Performed by: OBSTETRICS & GYNECOLOGY

## 2020-03-24 PROCEDURE — G8420 CALC BMI NORM PARAMETERS: HCPCS | Performed by: OBSTETRICS & GYNECOLOGY

## 2020-03-24 RX ORDER — CHOLECALCIFEROL TAB 125 MCG (5000 UNIT) 125 MCG (5000 UT)
TAB
COMMUNITY
Start: 2019-11-03 | End: 2020-03-24

## 2020-03-26 ENCOUNTER — OFFICE VISIT (OUTPATIENT)
Dept: NEUROLOGY | Age: 45
End: 2020-03-26
Payer: COMMERCIAL

## 2020-03-26 VITALS — HEART RATE: 76 BPM | DIASTOLIC BLOOD PRESSURE: 67 MMHG | SYSTOLIC BLOOD PRESSURE: 104 MMHG

## 2020-03-26 DIAGNOSIS — B20 HIV INFECTION, UNSPECIFIED SYMPTOM STATUS (HCC): ICD-10-CM

## 2020-03-26 DIAGNOSIS — A53.0 POSITIVE RPR TEST: ICD-10-CM

## 2020-03-26 PROBLEM — G43.701 CHRONIC MIGRAINE WITHOUT AURA WITH STATUS MIGRAINOSUS, NOT INTRACTABLE: Status: ACTIVE | Noted: 2020-03-26

## 2020-03-26 PROBLEM — R55 SYNCOPE AND COLLAPSE: Status: ACTIVE | Noted: 2020-03-26

## 2020-03-26 PROBLEM — R10.2 PAIN IN PELVIS: Status: ACTIVE | Noted: 2020-03-26

## 2020-03-26 PROBLEM — F32.A DEPRESSION: Status: ACTIVE | Noted: 2020-03-26

## 2020-03-26 PROBLEM — G93.41 METABOLIC ENCEPHALOPATHY: Status: ACTIVE | Noted: 2020-03-26

## 2020-03-26 PROBLEM — G31.84 MILD COGNITIVE IMPAIRMENT: Status: ACTIVE | Noted: 2020-03-26

## 2020-03-26 PROCEDURE — G8420 CALC BMI NORM PARAMETERS: HCPCS | Performed by: PSYCHIATRY & NEUROLOGY

## 2020-03-26 PROCEDURE — G8482 FLU IMMUNIZE ORDER/ADMIN: HCPCS | Performed by: PSYCHIATRY & NEUROLOGY

## 2020-03-26 PROCEDURE — G8427 DOCREV CUR MEDS BY ELIG CLIN: HCPCS | Performed by: PSYCHIATRY & NEUROLOGY

## 2020-03-26 PROCEDURE — 1036F TOBACCO NON-USER: CPT | Performed by: PSYCHIATRY & NEUROLOGY

## 2020-03-26 PROCEDURE — 99204 OFFICE O/P NEW MOD 45 MIN: CPT | Performed by: PSYCHIATRY & NEUROLOGY

## 2020-03-26 RX ORDER — TOPIRAMATE 25 MG/1
TABLET ORAL
Qty: 90 TABLET | Refills: 3 | Status: SHIPPED | OUTPATIENT
Start: 2020-03-26 | End: 2021-12-09 | Stop reason: ALTCHOICE

## 2020-03-26 RX ORDER — SUMATRIPTAN 50 MG/1
50 TABLET, FILM COATED ORAL
Qty: 9 TABLET | Refills: 3 | Status: SHIPPED | OUTPATIENT
Start: 2020-03-26 | End: 2021-06-10

## 2020-03-26 ASSESSMENT — ENCOUNTER SYMPTOMS
NAUSEA: 0
TROUBLE SWALLOWING: 0
PHOTOPHOBIA: 0
BACK PAIN: 0
SHORTNESS OF BREATH: 0
VOMITING: 0
CHOKING: 0

## 2020-03-26 NOTE — PROGRESS NOTES
bands in cerebrospinal fluid 7/1/2019     Past Surgical History:   Procedure Laterality Date    CERVICAL CERCLAGE  10 yrs ago    3 pregnancies & 3 cerclages    HYSTEROSCOPY DIAGNOSTIC N/A 11/14/2016    HYSTEROSCOPY OPERATIVE  LAPAROSCOPY, Slovenian SPEAKING  performed by Vishal Harmon DO at [address] Bilateral      Social History     Socioeconomic History    Marital status: Single     Spouse name: Not on file    Number of children: Not on file    Years of education: Not on file    Highest education level: Not on file   Occupational History    Not on file   Social Needs    Financial resource strain: Not on file    Food insecurity     Worry: Not on file     Inability: Not on file    Transportation needs     Medical: Not on file     Non-medical: Not on file   Tobacco Use    Smoking status: Never Smoker    Smokeless tobacco: Never Used   Substance and Sexual Activity    Alcohol use: No     Comment: occasional    Drug use: No    Sexual activity: Not on file   Lifestyle    Physical activity     Days per week: Not on file     Minutes per session: Not on file    Stress: Not on file   Relationships    Social connections     Talks on phone: Not on file     Gets together: Not on file     Attends Judaism service: Not on file     Active member of club or organization: Not on file     Attends meetings of clubs or organizations: Not on file     Relationship status: Not on file    Intimate partner violence     Fear of current or ex partner: Not on file     Emotionally abused: Not on file     Physically abused: Not on file     Forced sexual activity: Not on file   Other Topics Concern    Not on file   Social History Narrative    Not on file     Family History   Problem Relation Age of Onset    High Blood Pressure Mother     Stroke Mother     Diabetes Mother     No Known Problems Father      Allergies   Allergen Reactions    Aspirin     Metronidazole Swelling    Sulfa Antibiotics Swelling         Review of Systems   Constitutional: Negative for fever. HENT: Negative for ear pain, tinnitus and trouble swallowing. Eyes: Negative for photophobia and visual disturbance. Respiratory: Negative for choking and shortness of breath. Cardiovascular: Negative for chest pain and palpitations. Gastrointestinal: Negative for nausea and vomiting. Musculoskeletal: Negative for back pain, gait problem, joint swelling, myalgias, neck pain and neck stiffness. Neurological: Negative for dizziness, tremors, seizures, syncope, facial asymmetry, speech difficulty, weakness, light-headedness, numbness and headaches. Psychiatric/Behavioral: Negative for behavioral problems, confusion, hallucinations and sleep disturbance. Objective:   /67 (Site: Right Upper Arm, Position: Sitting, Cuff Size: Medium Adult)   Pulse 76     Physical Exam  Vitals signs reviewed. Eyes:      Pupils: Pupils are equal, round, and reactive to light. Neck:      Musculoskeletal: Normal range of motion. Cardiovascular:      Rate and Rhythm: Normal rate and regular rhythm. Heart sounds: No murmur. Pulmonary:      Effort: Pulmonary effort is normal.      Breath sounds: Normal breath sounds. Musculoskeletal: Normal range of motion. Neurological:      Mental Status: She is alert and oriented to person, place, and time. Cranial Nerves: No cranial nerve deficit. Sensory: No sensory deficit. Motor: No abnormal muscle tone. Coordination: Coordination normal.      Deep Tendon Reflexes: Reflexes are normal and symmetric. Babinski sign absent on the right side. Babinski sign absent on the left side. Us Non Ob Transvaginal    Result Date: 3/10/2020  EXAMINATION:  US PELVIS COMPLETE, US NON OB TRANSVAGINAL HISTORY:  R10.2 Pelvic pain ICD10. Left lower quadrant pain. COMPARISON: Ultrasound 2/10/2019.  TECHNIQUE: Sonography of the pelvis was performed by transvaginal and transabdominal spectral waveforms present. Free fluid: None. Nonspecific thickened endometrial echo complex measuring up to 2.1 cm. Unremarkable appearance of the ovaries. Lab Results   Component Value Date    WBC 3.9 12/06/2019    RBC 3.65 12/06/2019    HGB 11.5 12/06/2019    HCT 35.7 12/06/2019    MCV 98.0 12/06/2019    MCH 31.5 12/06/2019    MCHC 32.1 12/06/2019    RDW 13.7 12/06/2019     12/06/2019     Lab Results   Component Value Date     12/06/2019    K 3.7 12/06/2019    K 4.3 02/19/2019     12/06/2019    CO2 22 12/06/2019    BUN 10 12/06/2019    CREATININE 0.64 12/06/2019    GFRAA >60.0 12/06/2019    LABGLOM >60.0 12/06/2019    GLUCOSE 75 12/06/2019    PROT 8.3 12/06/2019    LABALBU 4.7 12/06/2019    CALCIUM 10.1 12/06/2019    BILITOT 1.2 12/06/2019    ALKPHOS 53 12/06/2019    AST 19 12/06/2019    ALT 12 12/06/2019     No results found for: PROTIME, INR  Lab Results   Component Value Date    TSH 0.820 02/10/2019    IMUDAITG80 881 12/06/2019    FOLATE >20.0 12/06/2019    FERRITIN 12.9 02/10/2019    IRON 14 02/10/2019    TIBC 409 02/10/2019     Lab Results   Component Value Date    TRIG 88 07/10/2019    HDL 61 07/24/2019    LDLCALC 144 07/24/2019     Lab Results   Component Value Date    LABAMPH Neg 02/10/2019    BARBSCNU Neg 02/10/2019    LABBENZ Neg 02/10/2019    OPIATESCREENURINE Neg 02/10/2019    PHENCYCLIDINESCREENURINE Neg 02/10/2019    ETOH <10 02/10/2019     No results found for: LITHIUM, DILFRTOT, VALPROATE    Assessment:       Diagnosis Orders   1. Chronic migraine without aura with status migrainosus, not intractable     2. Oligoclonal bands in cerebrospinal fluid     Chronic migraine headaches with left-sided headaches. Patient also has HIV and HIV viremia is likely.   Extensive relations were done and this was an extended evaluation due to language translations using of iPad and multiple other evaluations done in the hospital.  Patient presented with encephalopathy  And was diagnosed with HIV she had an HIV encephalopathy with a positive lumbar puncture with oligoclonal bands consistent with inflammatory markers. 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 She was then referred somehow down to [facility] for an opinion and the opinion has not changed. In the first instance we will start her on Topamax and Imitrex details have been discussed with her through translation. She does not require any other intervention in terms of investigations as these are quite specific of what we have said. Plan:      No orders of the defined types were placed in this encounter. Orders Placed This Encounter   Medications    topiramate (TOPAMAX) 25 MG tablet     Sig: One qhs for 1 week, then 2  qhs for 1 week, then 3  qhs     Dispense:  90 tablet     Refill:  3    SUMAtriptan (IMITREX) 50 MG tablet     Sig: Take 1 tablet by mouth once as needed for Migraine     Dispense:  9 tablet     Refill:  3       Return in about 3 months (around 6/26/2020).       Oralia Plascencia MD

## 2020-03-27 LAB
RPR TITER: NORMAL
RPR: REACTIVE

## 2020-03-28 LAB — TREPONEMA PALLIDUM ANTIBODIES: NON REACTIVE

## 2020-06-05 ENCOUNTER — VIRTUAL VISIT (OUTPATIENT)
Dept: FAMILY MEDICINE CLINIC | Age: 45
End: 2020-06-05
Payer: COMMERCIAL

## 2020-06-05 PROCEDURE — G8427 DOCREV CUR MEDS BY ELIG CLIN: HCPCS | Performed by: FAMILY MEDICINE

## 2020-06-05 PROCEDURE — 99213 OFFICE O/P EST LOW 20 MIN: CPT | Performed by: FAMILY MEDICINE

## 2020-06-05 RX ORDER — CITALOPRAM 20 MG/1
20 TABLET ORAL DAILY
Qty: 30 TABLET | Refills: 6 | Status: SHIPPED | OUTPATIENT
Start: 2020-06-05 | End: 2021-12-09 | Stop reason: SDUPTHER

## 2020-06-11 ENCOUNTER — NURSE ONLY (OUTPATIENT)
Dept: FAMILY MEDICINE CLINIC | Age: 45
End: 2020-06-11
Payer: COMMERCIAL

## 2020-06-11 PROCEDURE — 90732 PPSV23 VACC 2 YRS+ SUBQ/IM: CPT | Performed by: FAMILY MEDICINE

## 2020-06-11 PROCEDURE — 90471 IMMUNIZATION ADMIN: CPT | Performed by: FAMILY MEDICINE

## 2020-06-11 NOTE — PROGRESS NOTES
Wanda Maximo  1975    Chief Complaint   Patient presents with    Injections     Pneumonia vaccine       Respiratory Symptoms:  Patient complains none. Symptoms have been stable with time. She denies . Relevant PMH: No pertinent PMH. Smoking history:  She  reports that she has never smoked. She has never used smokeless tobacco.     She has had no known ill contacts. Treatment to date: none. Travel screen completed:   Yes

## 2020-06-24 ENCOUNTER — TELEPHONE (OUTPATIENT)
Dept: INFECTIOUS DISEASES | Age: 45
End: 2020-06-24

## 2020-06-25 ENCOUNTER — OFFICE VISIT (OUTPATIENT)
Dept: OBGYN CLINIC | Age: 45
End: 2020-06-25
Payer: COMMERCIAL

## 2020-06-25 VITALS
HEART RATE: 75 BPM | SYSTOLIC BLOOD PRESSURE: 110 MMHG | BODY MASS INDEX: 21.28 KG/M2 | DIASTOLIC BLOOD PRESSURE: 56 MMHG | WEIGHT: 124 LBS

## 2020-06-25 PROCEDURE — G8427 DOCREV CUR MEDS BY ELIG CLIN: HCPCS | Performed by: OBSTETRICS & GYNECOLOGY

## 2020-06-25 PROCEDURE — 1036F TOBACCO NON-USER: CPT | Performed by: OBSTETRICS & GYNECOLOGY

## 2020-06-25 PROCEDURE — 99213 OFFICE O/P EST LOW 20 MIN: CPT | Performed by: OBSTETRICS & GYNECOLOGY

## 2020-06-25 PROCEDURE — G8420 CALC BMI NORM PARAMETERS: HCPCS | Performed by: OBSTETRICS & GYNECOLOGY

## 2020-06-25 RX ORDER — PRENATAL WITH FERROUS FUM AND FOLIC ACID 3080; 920; 120; 400; 22; 1.84; 3; 20; 10; 1; 12; 200; 27; 25; 2 [IU]/1; [IU]/1; MG/1; [IU]/1; MG/1; MG/1; MG/1; MG/1; MG/1; MG/1; UG/1; MG/1; MG/1; MG/1; MG/1
TABLET ORAL
COMMUNITY
Start: 2020-05-30 | End: 2020-09-03 | Stop reason: SDUPTHER

## 2020-06-25 RX ORDER — CHOLECALCIFEROL TAB 125 MCG (5000 UNIT) 125 MCG (5000 UT)
TAB
COMMUNITY
Start: 2020-06-21 | End: 2020-07-22 | Stop reason: SDUPTHER

## 2020-06-25 RX ORDER — SUMATRIPTAN 50 MG/1
TABLET, FILM COATED ORAL
COMMUNITY
Start: 2020-05-26 | End: 2021-06-10

## 2020-06-25 ASSESSMENT — ENCOUNTER SYMPTOMS
SHORTNESS OF BREATH: 0
BACK PAIN: 0
NAUSEA: 0
COLOR CHANGE: 0
BLOOD IN STOOL: 0
TROUBLE SWALLOWING: 0
VOMITING: 0
WHEEZING: 0
ABDOMINAL DISTENTION: 0
VOICE CHANGE: 0
CONSTIPATION: 0
ABDOMINAL PAIN: 0
SORE THROAT: 0
COUGH: 0
CHEST TIGHTNESS: 0

## 2020-06-25 NOTE — PROGRESS NOTES
Mental Status: She is alert. Psychiatric:         Behavior: Behavior is cooperative. 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   Follow-up in 1 month        Bret Rome DO

## 2020-07-06 DIAGNOSIS — N92.6 MENSTRUAL ABNORMALITY: ICD-10-CM

## 2020-07-06 LAB — FOLLICLE STIMULATING HORMONE: 7.5 MIU/ML

## 2020-07-09 LAB — ANTI MULLERIAN HORMONE: 15.85 NG/ML

## 2020-07-22 ENCOUNTER — VIRTUAL VISIT (OUTPATIENT)
Dept: FAMILY MEDICINE CLINIC | Age: 45
End: 2020-07-22
Payer: COMMERCIAL

## 2020-07-22 PROCEDURE — 99442 PR PHYS/QHP TELEPHONE EVALUATION 11-20 MIN: CPT | Performed by: FAMILY MEDICINE

## 2020-07-22 RX ORDER — PRENATAL VIT/IRON FUM/FOLIC AC 27MG-0.8MG
1 TABLET ORAL DAILY
Qty: 30 TABLET | Refills: 12 | Status: SHIPPED | OUTPATIENT
Start: 2020-07-22 | End: 2021-12-09 | Stop reason: ALTCHOICE

## 2020-07-22 NOTE — LETTER
July 22, 2020    Patient: Amanda Freeman   YOB: 1975   Date of Visit: 07/22/2020       To Whom it May Concern: This letter is being provided to support the medical necessity of Magy AMBRIZ Tapan's treatment of major depression with medications and an emotional support animal - mixed breed dog weighing 25 pounds. Magy Dove has the diagnosis of major depression and has failed the following conservative treatment: medication. If you have any questions or concerns, please don't hesitate to call.     Sincerely,         Mac Barkley MD

## 2020-07-22 NOTE — PROGRESS NOTES
Sarah Wagner is a 38 y.o. female evaluated via telephone on 2020. Consent:  She and/or health care decision maker is aware that that she may receive a bill for this telephone service, depending on her insurance coverage, and has provided verbal consent to proceed: Yes      Documentation:  I communicated with the patient and/or health care decision maker about see below. Details of this discussion including any medical advice provided: see below      I affirm this is a Patient Initiated Episode with an Established Patient who has not had a related appointment within my department in the past 7 days or scheduled within the next 24 hours. Total Time: minutes: 11-20 minutes    Note: not billable if this call serves to triage the patient into an appointment for the relevant concern      Lo GUERIN     2020    TELEHEALTH EVALUATION -- Audio (During [facility] public health emergency)    HPI:    Sarah Wagner (:  1975) has requested an audio evaluation for the following concern(s):    Has painful vein in left leg from her knee down to her toes without swelling or redness. She did have what appeared to have been a bruise along a vein. The pain has been constant since over a month ago. She has no history of VTE. No history of trauma. No chest pain or shortness of breath. She continues to experience mild to moderate depression nearly half the days. Her dog provides emotional support. She is also on citalopram.  It provides some benefit. She does not wish to have additional medication adjustment at this time. No thoughts of suicide or self-harm. Support system is limited. Review of Systems    Prior to Visit Medications    Medication Sig Taking?  Authorizing Provider   Cholecalciferol (VITAMIN D3) 125 MCG (5000 UT) CAPS Take 1 capsule by mouth daily Yes Mynor Goyal MD   Prenatal Vit-Fe Fumarate-FA (PRENATAL LOW IRON) 27-0.8 MG TABS Take 1 tablet by mouth daily Yes Robert Hinton MD   Prenatal Vit-Fe Fumarate-FA (PRENATAL VITAMIN) 27-1 MG TABS tablet TAKE 1 TABLET BY MOUTH EVERY DAY  Historical Provider, MD   SUMAtriptan (IMITREX) 50 MG tablet TAKE 1 TABLET BY MOUTH ONCE AS NEEDED FOR MIGRAINE  Historical Provider, MD   citalopram (CELEXA) 20 MG tablet Take 1 tablet by mouth daily  Robert Hinton MD   topiramate (TOPAMAX) 25 MG tablet One qhs for 1 week, then 2  qhs for 1 week, then 3  qhs  [facility] KAI Vila MD   SUMAtriptan (IMITREX) 50 MG tablet Take 1 tablet by mouth once as needed for Migraine  Roman Harper MD   MUCINEX 600 MG extended release tablet   Historical Provider, MD   Probiotic Product (ACIDOPHILUS/BIFIDUS PO) Take by mouth  Historical Provider, MD   dapsone 100 MG tablet   Historical Provider, MD   terbinafine (LAMISIL) 1 % cream Apply topically 2 times daily.   Patient not taking: Reported on 3/26/2020  Robert Hinton MD   GENVOYA [phone] MG TABLET TAKE 1 TABLET DAILY  Historical Provider, MD       Social History     Tobacco Use    Smoking status: Never Smoker    Smokeless tobacco: Never Used   Substance Use Topics    Alcohol use: No     Comment: occasional    Drug use: No        Allergies   Allergen Reactions    Sulfa Antibiotics Swelling   ,   Past Medical History:   Diagnosis Date    Behavior disturbance     Encephalopathy 2/10/2019    HIV (human immunodeficiency virus infection) ([facility] 2/25/2019    Infection due to urethral catheter (HCC)     Iron deficiency anemia secondary to inadequate dietary iron intake 7/1/2019    Moderate malnutrition ([facility] 2/18/2019    Noncompliance     Obstructed fallopian tubes 10/31/2016    Oligoclonal bands in cerebrospinal fluid 7/1/2019   ,   Past Surgical History:   Procedure Laterality Date    CERVICAL CERCLAGE  10 yrs ago    3 pregnancies & 3 cerclages    HYSTEROSCOPY DIAGNOSTIC N/A 11/14/2016    HYSTEROSCOPY OPERATIVE LAPAROSCOPY, German SPEAKING  performed by Nilesh Perdomo DO at [address] [facility] Bilateral    ,   Social History     Tobacco Use    Smoking status: Never Smoker    Smokeless tobacco: Never Used   Substance Use Topics    Alcohol use: No     Comment: occasional    Drug use: No       PHYSICAL EXAMINATION:  [ INSTRUCTIONS:  \"[x]\" Indicates a positive item  \"[]\" Indicates a negative item  -- DELETE ALL ITEMS NOT EXAMINED]  Vital Signs: unavailable    Patient appears to be alert and oriented to person, place, time, situation and is in no acute distress. Respiratory effort appears normal. Mood appears stable and speech and thought are grossly normal.    ASSESSMENT/PLAN:  Diagnoses and all orders for this visit:    Pain of right lower extremity  Comments:  Suspect thrombophlebitis. Referred to Dr. Gilbert Williamson. Orders:  -     Jocelyn Garcia MD, Interventional Radiology, [place]    Varicose veins of right lower extremity with pain  Comments:  See above. Orders:  -     Jocelyn Garcia MD, Interventional Radiology, [place]    Major depressive disorder with single episode, in full remission [facility])  Comments:  Patient states she needs a note for section 8 housing in order to keep her dog every 6 months. Letter has been written. Other orders  -     Cholecalciferol (VITAMIN D3) 125 MCG (5000 UT) CAPS; Take 1 capsule by mouth daily  -     Prenatal Vit-Fe Fumarate-FA (PRENATAL LOW IRON) 27-0.8 MG TABS; Take 1 tablet by mouth daily          Return if symptoms worsen or fail to improve, for has f/u in December. Sarah Wagner is a 38 y.o. female being evaluated by a Virtual Visit (telephonic visit) encounter to address concerns as mentioned above. A caregiver was present when appropriate.  Due to this being a TeleHealth encounter ([facility]), evaluation of the following organ systems was limited: Vitals/Constitutional/EENT/Resp/CV/GI//MS/Neuro/Skin/Heme-Lymph-Imm. Pursuant to the emergency declaration under the [address] [place] [address] and the [employer] Act, this Virtual Visit was conducted with patient's (and/or legal guardian's) consent, to reduce the patient's risk of exposure to COVID-19 and provide necessary medical care. The patient (and/or legal guardian) has also been advised to contact this office for worsening conditions or problems, and seek emergency medical treatment and/or call 911 if deemed necessary. Services were provided through a telephonic synchronous discussion virtually to substitute for in-person clinic visit. Patient and provider were located at their individual homes. --Juju Amaral MD on 7/22/2020 at 10:20 AM    An electronic signature was used to authenticate this note.

## 2020-07-24 ENCOUNTER — OFFICE VISIT (OUTPATIENT)
Dept: OBGYN CLINIC | Age: 45
End: 2020-07-24
Payer: COMMERCIAL

## 2020-07-24 VITALS
WEIGHT: 125 LBS | BODY MASS INDEX: 21.46 KG/M2 | DIASTOLIC BLOOD PRESSURE: 76 MMHG | HEART RATE: 78 BPM | SYSTOLIC BLOOD PRESSURE: 114 MMHG

## 2020-07-24 PROCEDURE — 99213 OFFICE O/P EST LOW 20 MIN: CPT | Performed by: OBSTETRICS & GYNECOLOGY

## 2020-07-24 PROCEDURE — G8427 DOCREV CUR MEDS BY ELIG CLIN: HCPCS | Performed by: OBSTETRICS & GYNECOLOGY

## 2020-07-24 PROCEDURE — 1036F TOBACCO NON-USER: CPT | Performed by: OBSTETRICS & GYNECOLOGY

## 2020-07-24 PROCEDURE — G8420 CALC BMI NORM PARAMETERS: HCPCS | Performed by: OBSTETRICS & GYNECOLOGY

## 2020-07-24 RX ORDER — HYDROCODONE BITARTRATE AND ACETAMINOPHEN 5; 325 MG/1; MG/1
TABLET ORAL
COMMUNITY
Start: 2020-07-15 | End: 2021-06-10

## 2020-07-24 ASSESSMENT — ENCOUNTER SYMPTOMS
COLOR CHANGE: 0
BLOOD IN STOOL: 0
NAUSEA: 0
SHORTNESS OF BREATH: 0
ABDOMINAL PAIN: 0
VOMITING: 0
CONSTIPATION: 0
TROUBLE SWALLOWING: 0
VOICE CHANGE: 0
CHEST TIGHTNESS: 0
BACK PAIN: 0
WHEEZING: 0
SORE THROAT: 0
ABDOMINAL DISTENTION: 0
COUGH: 0

## 2020-08-04 ENCOUNTER — TELEPHONE (OUTPATIENT)
Dept: OBGYN CLINIC | Age: 45
End: 2020-08-04

## 2020-08-04 ENCOUNTER — OFFICE VISIT (OUTPATIENT)
Dept: INTERVENTIONAL RADIOLOGY/VASCULAR | Age: 45
End: 2020-08-04
Payer: COMMERCIAL

## 2020-08-04 VITALS
HEART RATE: 70 BPM | RESPIRATION RATE: 16 BRPM | DIASTOLIC BLOOD PRESSURE: 65 MMHG | OXYGEN SATURATION: 98 % | SYSTOLIC BLOOD PRESSURE: 99 MMHG | WEIGHT: 124.2 LBS | BODY MASS INDEX: 21.32 KG/M2

## 2020-08-04 DIAGNOSIS — B20 HIV INFECTION, UNSPECIFIED SYMPTOM STATUS (HCC): ICD-10-CM

## 2020-08-04 PROBLEM — I83.812 VARICOSE VEINS OF LEG WITH PAIN, LEFT: Status: ACTIVE | Noted: 2020-08-04

## 2020-08-04 LAB
ALBUMIN SERPL-MCNC: 4.3 G/DL (ref 3.5–4.6)
ALP BLD-CCNC: 47 U/L (ref 40–130)
ALT SERPL-CCNC: 14 U/L (ref 0–33)
ANION GAP SERPL CALCULATED.3IONS-SCNC: 15 MEQ/L (ref 9–15)
AST SERPL-CCNC: 17 U/L (ref 0–35)
BILIRUB SERPL-MCNC: <0.2 MG/DL (ref 0.2–0.7)
BUN BLDV-MCNC: 9 MG/DL (ref 6–20)
CALCIUM SERPL-MCNC: 10 MG/DL (ref 8.5–9.9)
CHLORIDE BLD-SCNC: 102 MEQ/L (ref 95–107)
CHOLESTEROL, TOTAL: 209 MG/DL (ref 0–199)
CO2: 23 MEQ/L (ref 20–31)
CREAT SERPL-MCNC: 0.62 MG/DL (ref 0.5–0.9)
GFR AFRICAN AMERICAN: >60
GFR NON-AFRICAN AMERICAN: >60
GLOBULIN: 3.3 G/DL (ref 2.3–3.5)
GLUCOSE BLD-MCNC: 95 MG/DL (ref 70–99)
HCT VFR BLD CALC: 42 % (ref 37–47)
HDLC SERPL-MCNC: 48 MG/DL (ref 40–59)
HEMOGLOBIN: 14.2 G/DL (ref 12–16)
HEPATITIS C ANTIBODY INTERPRETATION: NORMAL
LDL CHOLESTEROL CALCULATED: 133 MG/DL (ref 0–129)
MCH RBC QN AUTO: 31.1 PG (ref 27–31.3)
MCHC RBC AUTO-ENTMCNC: 33.7 % (ref 33–37)
MCV RBC AUTO: 92.3 FL (ref 82–100)
PDW BLD-RTO: 12.2 % (ref 11.5–14.5)
PLATELET # BLD: 231 K/UL (ref 130–400)
POTASSIUM SERPL-SCNC: 4.1 MEQ/L (ref 3.4–4.9)
RBC # BLD: 4.56 M/UL (ref 4.2–5.4)
SODIUM BLD-SCNC: 140 MEQ/L (ref 135–144)
TOTAL PROTEIN: 7.6 G/DL (ref 6.3–8)
TRIGL SERPL-MCNC: 139 MG/DL (ref 0–150)
VITAMIN D 25-HYDROXY: 73.4 NG/ML (ref 30–100)
WBC # BLD: 4.6 K/UL (ref 4.8–10.8)

## 2020-08-04 PROCEDURE — G8427 DOCREV CUR MEDS BY ELIG CLIN: HCPCS | Performed by: NURSE PRACTITIONER

## 2020-08-04 PROCEDURE — G8420 CALC BMI NORM PARAMETERS: HCPCS | Performed by: NURSE PRACTITIONER

## 2020-08-04 PROCEDURE — 99244 OFF/OP CNSLTJ NEW/EST MOD 40: CPT | Performed by: NURSE PRACTITIONER

## 2020-08-04 ASSESSMENT — ENCOUNTER SYMPTOMS
ABDOMINAL PAIN: 0
SHORTNESS OF BREATH: 0
DIARRHEA: 0
ABDOMINAL DISTENTION: 0
SINUS PAIN: 0
VOMITING: 0
BACK PAIN: 0
EYE REDNESS: 0
CONSTIPATION: 0
EYE ITCHING: 0
WHEEZING: 0
COUGH: 0
NAUSEA: 0
TROUBLE SWALLOWING: 0
SINUS PRESSURE: 0
EYE DISCHARGE: 0
EYE PAIN: 0
SORE THROAT: 0

## 2020-08-04 NOTE — TELEPHONE ENCOUNTER
Pt calling stating that the pharmacy keeps contacting her notifying her that she needs a prior authorization for a medication. Pt does not know what medication. Pt asking for nurse to call pharmacy to find out about the prior auth. [facility] [place] on [address]

## 2020-08-04 NOTE — PROGRESS NOTES
Oli Vasquez, a female of 38 y.o. came to the office 8/4/2020. Chief Complaint   Patient presents with    Leg Pain     30 minutes washing or cleaning left leg aches     Leg Swelling       8/4/2020 HPI: Oli Vasquez presents to clinic for consultation at the request of PCP for evaluation of LE painful varicosities. She is mostly South [place]ese speaking and phone  used with her in agreement. Patient presents with symptoms of: Left lower leg pain below knee she describe as a burning, hot pain along with numbness to last three toes. Pain is concentrated mostly to medial aspect and posterior calf. Going on for several months with progression in symptoms. States there is also bilateral LE swelling with LLE > RLE and swelling becomes worse longer she is up on feet. Painful varicose vein to posterior left distal thigh and medial knee causing throbbing when she is up on feet for long period of time. Denies painful VV to RLE  Denies LE fatigue, heaviness, or injury, calf pain. Affect on activities of daily living: difficulty sleeping due to pain. NSAIDS: PRN Advil with moderate relief. Leg elevation: daily with relief. Stocking use and dates: Has never done conservative therapy with compression socks.    Claudication: denies      Family History   Problem Relation Age of Onset    High Blood Pressure Mother     Stroke Mother     Diabetes Mother     No Known Problems Father        Past Surgical History:   Procedure Laterality Date    CERVICAL CERCLAGE  10 yrs ago    3 pregnancies & 3 cerclages    HYSTEROSCOPY DIAGNOSTIC N/A 11/14/2016    HYSTEROSCOPY OPERATIVE  LAPAROSCOPY, Tajik SPEAKING  performed by Sri Paredes DO at [address] Bilateral         Past Medical History:   Diagnosis Date    Behavior disturbance     Encephalopathy 2/10/2019    HIV (human immunodeficiency virus infection) ([facility] [address] 2/25/2019    Infection due to urethral catheter ([facility] [address] Psychiatric/Behavioral: The patient is not nervous/anxious. OBJECTIVE:  BP 99/65 (Site: Left Upper Arm, Position: Sitting, Cuff Size: Small Adult)   Pulse 70   Resp 16   Wt 124 lb 3.2 oz (56.3 kg)   SpO2 98%   BMI 21.32 kg/m²     Physical Exam  Nursing note reviewed. Constitutional:       General: She is not in acute distress. Appearance: She is obese. HENT:      Head: Normocephalic and atraumatic. Nose: Nose normal. No congestion. Mouth/Throat:      Mouth: Mucous membranes are moist.      Pharynx: Oropharynx is clear. Neck:      Musculoskeletal: Normal range of motion and neck supple. Cardiovascular:      Rate and Rhythm: Normal rate and regular rhythm. Pulses: Normal pulses. Popliteal pulses are 2+ on the right side and 2+ on the left side. Dorsalis pedis pulses are 2+ on the right side and 2+ on the left side. Posterior tibial pulses are 2+ on the right side and 2+ on the left side. Heart sounds: Normal heart sounds. Pulmonary:      Effort: Pulmonary effort is normal. No respiratory distress. Breath sounds: Normal breath sounds. No wheezing. Abdominal:      General: Bowel sounds are normal. There is no distension. Tenderness: There is no abdominal tenderness. Musculoskeletal:         General: Tenderness (LLE varicose veins with palpation ) present. Skin:     General: Skin is warm and dry. Capillary Refill: Capillary refill takes 2 to 3 seconds. Coloration: Skin is not pale. Findings: No erythema, lesion or rash. Neurological:      Mental Status: She is alert and oriented to person, place, and time. Psychiatric:         Mood and Affect: Mood normal.         Behavior: Behavior normal.       ASSESSMENT AND PLAN:    Assessment:   1. Chronic bilateral LE aching, swelling and painful varicose veins with LLE > RLE. Has not worn compression stockings. Plan:   1.  LE Venous US rule out venous insufficiency with office follow up for results. Thank You Dr. Jesi Villa for referral of your patient to our practice for care.      JESU Bowman - CNP

## 2020-08-05 LAB — RPR: REACTIVE

## 2020-08-06 LAB
ABSOLUTE CD 4 HELPER: 463 CELLS/UL (ref 430–1800)
CD4 % HELPER T CELL: 32 % (ref 32–64)
LYMPHOCYTE SUBSET PANEL 2 INFO: NORMAL
QUANTI TB GOLD PLUS: NEGATIVE
QUANTI TB1 MINUS NIL: 0 IU/ML (ref 0–0.34)
QUANTI TB2 MINUS NIL: 0 IU/ML (ref 0–0.34)
QUANTIFERON MITOGEN: 0.8 IU/ML
QUANTIFERON NIL: 0.02 IU/ML

## 2020-08-07 LAB — TREPONEMA PALLIDUM ANTIBODIES: NON REACTIVE

## 2020-08-10 ENCOUNTER — TELEPHONE (OUTPATIENT)
Dept: OBGYN CLINIC | Age: 45
End: 2020-08-10

## 2020-08-10 NOTE — TELEPHONE ENCOUNTER
Please advise that medicaid will not cover any infertility products.  She will need to pay out of pocket

## 2020-08-10 NOTE — TELEPHONE ENCOUNTER
Adrienne Newman calling on behalf of pt for interpretation. Pt states her clomid is not covered by insurance and would cost her $75-$80. She is asking if the doctor can send something to the pharmacy in place of that that is covered.

## 2020-08-11 ENCOUNTER — NURSE ONLY (OUTPATIENT)
Dept: INFECTIOUS DISEASES | Age: 45
End: 2020-08-11

## 2020-08-11 NOTE — PROGRESS NOTES
Re-Assessment      Patient ID:   Noemi Osborne  Date:   8/11/2020      Client ID:  [id number]    [address]  [address]  [phone] (home)     Family/House:    [] Partner  [] Children  [] Other -     Mental Health:   Hx of MH    Substance Abuse:   Marijuana Use  Social History     Socioeconomic History    Marital status: Single     Spouse name: Not on file    Number of children: Not on file    Years of education: Not on file    Highest education level: Not on file   Occupational History    Not on file   Social Needs    Financial resource strain: Not on file    Food insecurity     Worry: Not on file     Inability: Not on file    Transportation needs     Medical: Not on file     Non-medical: Not on file   Tobacco Use    Smoking status: Never Smoker    Smokeless tobacco: Never Used   Substance and Sexual Activity    Alcohol use: No     Comment: occasional    Drug use: No    Sexual activity: Not on file   Lifestyle    Physical activity     Days per week: Not on file     Minutes per session: Not on file    Stress: Not on file   Relationships    Social connections     Talks on phone: Not on file     Gets together: Not on file     Attends Mosque service: Not on file     Active member of club or organization: Not on file     Attends meetings of clubs or organizations: Not on file     Relationship status: Not on file    Intimate partner violence     Fear of current or ex partner: Not on file     Emotionally abused: Not on file     Physically abused: Not on file     Forced sexual activity: Not on file   Other Topics Concern    Not on file   Social History Narrative    Not on file       Housing:   with family    Health/Healthcare:  HIV Stable   Physician Visit:  Dr. Gilford Shiver- 2/20   D[facility] Visit:  [facility] 8/20    CD4:   Lab Results   Component Value Date    LABABSO 463 08/04/2020       Viral Load:  Lab Results   Component Value Date    [id number] Not Detected 12/18/2019 Medical Insurance:  Payor: Stephen Freeze / Plan: Joby July / Product Type: *No Product type* /    OHDAP/HIPSCA:  NA   Renewal Date:  NA    Income:    Child Support    Legal Issues:    NA    Emergency Contact:   Extended Emergency Contact Information  Primary Emergency Contact: Keon Maldonado  Address: [address], [address] [place] [address] [address] Phone: [phone]  Mobile Phone: [phone]  Relation: Other  Secondary Emergency Contact: Danika Richey [facility] [address] Phone: [phone]  Relation: Child  Pt presented for scheduled appointment with CM. CM completed Semi-Annual Review. No changes noted to RW Part A Eligibility for services. Pt continues to receive child support for 2 of 3 children. CM reviewed Grievance Policy, Sliding Scale and Transportation Policy with pt. CM completed PSA and SA/MH Assessments. Pt has hx of MH, smokes marijuana. CM updated ISP, pt signed in agreement with POC. Pt remains compliant with care. Last VL from 12/19 is undetectable. VL from 8/4/20 is pending. Pt has follow up appointment with dentist 8/12/20. Pt is sexually active. Pt is aware of U=U, undetectable equals un-transmittable. During visit, CM provided translation to facilitate communication with RN. Pt has positive RPR, titer is still pending to determine if treatment is needed. CM provided 1x1, pt discussed issues regarding dynamics of relationship with her father and father of her children. CM provided active listening and support. CM provided food and gas vouchers, need assessed. CM also provided PPE, a RW Part A provision. CM will follow up with pt as needed.

## 2020-08-20 ENCOUNTER — OFFICE VISIT (OUTPATIENT)
Dept: INTERVENTIONAL RADIOLOGY/VASCULAR | Age: 45
End: 2020-08-20
Payer: COMMERCIAL

## 2020-08-20 VITALS
HEART RATE: 72 BPM | DIASTOLIC BLOOD PRESSURE: 64 MMHG | SYSTOLIC BLOOD PRESSURE: 96 MMHG | BODY MASS INDEX: 21.28 KG/M2 | WEIGHT: 124 LBS

## 2020-08-20 PROCEDURE — 99215 OFFICE O/P EST HI 40 MIN: CPT | Performed by: NURSE PRACTITIONER

## 2020-08-20 PROCEDURE — 1036F TOBACCO NON-USER: CPT | Performed by: NURSE PRACTITIONER

## 2020-08-20 PROCEDURE — G8427 DOCREV CUR MEDS BY ELIG CLIN: HCPCS | Performed by: NURSE PRACTITIONER

## 2020-08-20 PROCEDURE — G8420 CALC BMI NORM PARAMETERS: HCPCS | Performed by: NURSE PRACTITIONER

## 2020-08-20 ASSESSMENT — ENCOUNTER SYMPTOMS
EYE PAIN: 0
VOMITING: 0
EYE REDNESS: 0
EYE DISCHARGE: 0
COUGH: 0
WHEEZING: 0
ABDOMINAL PAIN: 0
CONSTIPATION: 0
ABDOMINAL DISTENTION: 0
NAUSEA: 0
DIARRHEA: 0
EYE ITCHING: 0
TROUBLE SWALLOWING: 0
SORE THROAT: 0
SHORTNESS OF BREATH: 0
SINUS PAIN: 0
SINUS PRESSURE: 0
BACK PAIN: 0

## 2020-08-20 NOTE — PROGRESS NOTES
Lm Shen, a female of 38 y.o. came to the office 8/20/2020. Chief Complaint   Patient presents with    Follow-up     pt here to go over us results     SUBJECTIVE:      8/20/2020: Lm Shen return for results of LE venous US. US negative for insufficiency bilaterally. Negative DVT's bilaterally. Patient states LE symptoms remain unchanged and persist.   Today she states she begins to have pain in both LE's after ambulating a short distance she is unsure as to how far. She must stop for relief of pain. States she continues to have numbness to toes 2-4 to left foot. Nothing exacerbates this. Happens randomly, occasionally. There persists painful varicose vein to left posterior leg. 8/4/2020 HPI: Lm Shen presents to clinic for consultation at the request of PCP for evaluation of LE painful varicosities. She is mostly Korean speaking and phone  used with her in agreement. Patient presents with symptoms of: Left lower leg pain below knee she describe as a burning, hot pain along with numbness to last three toes. Pain is concentrated mostly to medial aspect and posterior calf. Going on for several months with progression in symptoms. States there is also bilateral LE swelling with LLE > RLE and swelling becomes worse longer she is up on feet. Painful varicose vein to posterior left distal thigh and medial knee causing throbbing when she is up on feet for long period of time. Denies painful VV to RLE  Denies LE fatigue, heaviness, or injury, calf pain. Affect on activities of daily living: difficulty sleeping due to pain. NSAIDS: PRN Advil with moderate relief. Leg elevation: daily with relief. Stocking use and dates: Has never done conservative therapy with compression socks. Claudication: denies       Review of Systems   Constitutional: Negative for activity change, appetite change, chills, fatigue and fever.    HENT: Negative for congestion, sinus pressure, sinus pain, sore throat and trouble swallowing. Eyes: Negative for pain, discharge, redness and itching. Respiratory: Negative for cough, shortness of breath and wheezing. Cardiovascular: Positive for leg swelling (chronic bilateral LE with LLE > RLE). Negative for chest pain and palpitations. Painful varicose veins to LLE. Gastrointestinal: Negative for abdominal distention, abdominal pain, constipation, diarrhea, nausea and vomiting. Endocrine: Negative for cold intolerance and heat intolerance. Genitourinary: Negative for difficulty urinating, frequency, hematuria and urgency. Musculoskeletal: Negative for back pain, neck pain and neck stiffness. Chronic bilateral LE below knee aching. Skin: Negative for rash and wound. Neurological: Positive for numbness (left toes 2-4, occasional. ). Negative for dizziness, light-headedness and headaches. Hematological: Does not bruise/bleed easily. Psychiatric/Behavioral: The patient is not nervous/anxious. OBJECTIVE:  BP 96/64   Pulse 72   Wt 124 lb (56.2 kg)   BMI 21.28 kg/m²     Physical Exam  Nursing note reviewed. Constitutional:       General: She is not in acute distress. Appearance: Normal appearance. HENT:      Head: Normocephalic and atraumatic. Nose: Nose normal. No congestion. Mouth/Throat:      Mouth: Mucous membranes are moist.      Pharynx: Oropharynx is clear. Neck:      Musculoskeletal: Normal range of motion and neck supple. Cardiovascular:      Rate and Rhythm: Normal rate and regular rhythm. Pulses: Normal pulses. Popliteal pulses are 2+ on the right side and 2+ on the left side. Dorsalis pedis pulses are 2+ on the right side and 2+ on the left side. Posterior tibial pulses are 2+ on the right side and 2+ on the left side. Heart sounds: Normal heart sounds.    Pulmonary:      Effort: Pulmonary effort is normal. No respiratory distress. Breath sounds: Normal breath sounds. No wheezing. Abdominal:      General: Bowel sounds are normal. There is no distension. Tenderness: There is no abdominal tenderness. Musculoskeletal:         General: Tenderness (LLE varicose veins with palpation ) present. Skin:     General: Skin is warm and dry. Capillary Refill: Capillary refill takes 2 to 3 seconds. Coloration: Skin is not pale. Findings: No erythema, lesion or rash. Neurological:      Mental Status: She is alert and oriented to person, place, and time. Psychiatric:         Mood and Affect: Mood normal.         Behavior: Behavior normal.       ASSESSMENT AND PLAN:    Assessment:   1. Intermittent claudication  2. Chronic bilateral LE aching, swelling with LLE > RLE. 3.  Painful Left leg varicose vein  4. Occasional numbness to left 2-4 toes. LE Venous US negative for DVT's and insufficiency bilaterally. Plan:   LE Arterial US, as the Venous US was negative, with office follow up for results. Will consider at next follow up, depending on LE arterial results, possible referral to neurology for left toe numbness. Patient states she currently follows with Dr. Aline Lara, [facility] neurology.      Johnie Lynne, APRN - CNP

## 2020-09-02 ENCOUNTER — VIRTUAL VISIT (OUTPATIENT)
Dept: INFECTIOUS DISEASES | Age: 45
End: 2020-09-02
Payer: COMMERCIAL

## 2020-09-02 PROCEDURE — G8428 CUR MEDS NOT DOCUMENT: HCPCS | Performed by: INTERNAL MEDICINE

## 2020-09-02 PROCEDURE — G8420 CALC BMI NORM PARAMETERS: HCPCS | Performed by: INTERNAL MEDICINE

## 2020-09-02 PROCEDURE — 99214 OFFICE O/P EST MOD 30 MIN: CPT | Performed by: INTERNAL MEDICINE

## 2020-09-02 PROCEDURE — 1036F TOBACCO NON-USER: CPT | Performed by: INTERNAL MEDICINE

## 2020-09-02 NOTE — PROGRESS NOTES
Cuba Meza  1975  female  Medical Record Number: [id number]    2020    TELEHEALTH EVALUATION -- Audio/Visual (During DHKYK-34 public health emergency)      Cuba Meza (:  1975) has requested an audio/video evaluation for the following concern(s):    HIV    Due to the COVID-19 outbreak, patient's office visit was converted to a virtual visit. Patient was contacted and agreed to proceed with a virtual visit with me via Doxy. me with my location at the office. Patient reports that they are located at home. The risks and benefits of converting to a virtual visit were discussed in light of the current infectious disease epidemic. Services were provided through an audio/video synchronous discussion virtually to substitute for in person clinic visit. THIS virtual visit was conducted via interactive/real-time audio/video. Due to this being a TeleHealth encounter, evaluation of the following organ systems is limited: Vitals/Constitutional/EENT/Resp/CV/GI//MS/Neuro/Skin/Heme-Lymph-Imm.}    Pursuant to the emergency declaration under the [address] [place] [address], [facility] waiver authority and the [employer] Act, this Virtual Visit was conducted, with patient's consent, to reduce the patient's risk of exposure to COVID-19 and provide care for the patient. Infectious Disease Progress Note    Patient is a followup for HIV  Her partner is accompanying her. T pallidum antibodies were negative. Her RPR is reactive but this may be serofast in HIV. NO SYPHILIS. Labs reviewed with patient    All 14 review of systems discussed and negative other than as stated as above. Since we cannot conduct an in-person exam, the following were addressed with the patient. I have asked the patient to assist in their exam:  Patient denies any general new issues. Patient does not observe any new skin rashes or ulcers. Patient does not perceive any new visual deficits  Patient does not feel like they are \"clammy\" or sweating  Patient denies any dry mouth or sore mouth and is able to flex and extend her neck with ease. Patient can inhale and exhale without any difficulty and feels like their chest is expanding symmetrically. They do not feel short of breath or any distress when asked to move around. No pain with expansion of the chest  Patient is able to feel their own pulse and agrees it is regular. Patient states their abdomen is not protruberant beyond their normal size. States that there is no pain when touching the area beneath the sternum when directed, or the left or the right side of the abdomen. They feel no pain when asked to press on the suprapubic area or the right or left flank. Patient denies any pain when asked to squeeze both upper legs and lower legs anteriorly or posteriorly. They do not see any new swelling of their joints. No observed neurological changes or slurred speech when speaking to the patient        Imaging and labs were reviewed per medical records and any ID pertinent labs were addressed with the patient. Lab Results   Component Value Date    WBC 4.6 (L) 08/04/2020    HGB 14.2 08/04/2020    HCT 42.0 08/04/2020    MCV 92.3 08/04/2020     08/04/2020         Assessment:  HIV - controlled. Hx of brain lesions and intermittent headaches - deferred to neuro. Vasculitis? Possibly repeat imaging in 6 months to a year. Varicose veins - following with [facility]    HIV/AIDS - Genvoya, 100% compliance. Plan:  Has an appointment with neurology ( Dr Ralph Valverde) regarding hx of headaches in December. Continue Genvoya  Follow up with vascular regarding her varicosities    RPR is reactive but her T pallidum confirmation is NEGATIVE so this is a serofast in HIV. Patient and partner deny any new sexual contacts.    Psych follow up  Neuro follow up  Follow up in 6 month    Flu vaccine this

## 2020-09-03 ENCOUNTER — OFFICE VISIT (OUTPATIENT)
Dept: INTERVENTIONAL RADIOLOGY/VASCULAR | Age: 45
End: 2020-09-03
Payer: COMMERCIAL

## 2020-09-03 ENCOUNTER — TELEPHONE (OUTPATIENT)
Dept: INFECTIOUS DISEASES | Age: 45
End: 2020-09-03

## 2020-09-03 VITALS
BODY MASS INDEX: 21.17 KG/M2 | DIASTOLIC BLOOD PRESSURE: 62 MMHG | WEIGHT: 124 LBS | RESPIRATION RATE: 12 BRPM | HEART RATE: 65 BPM | HEIGHT: 64 IN | SYSTOLIC BLOOD PRESSURE: 110 MMHG

## 2020-09-03 PROCEDURE — 99214 OFFICE O/P EST MOD 30 MIN: CPT | Performed by: NURSE PRACTITIONER

## 2020-09-03 PROCEDURE — G8427 DOCREV CUR MEDS BY ELIG CLIN: HCPCS | Performed by: NURSE PRACTITIONER

## 2020-09-03 PROCEDURE — G8420 CALC BMI NORM PARAMETERS: HCPCS | Performed by: NURSE PRACTITIONER

## 2020-09-03 PROCEDURE — 1036F TOBACCO NON-USER: CPT | Performed by: NURSE PRACTITIONER

## 2020-09-03 ASSESSMENT — ENCOUNTER SYMPTOMS
WHEEZING: 0
SINUS PRESSURE: 0
COUGH: 0
DIARRHEA: 0
EYE REDNESS: 0
BACK PAIN: 0
EYE PAIN: 0
SINUS PAIN: 0
ABDOMINAL PAIN: 0
SHORTNESS OF BREATH: 0
CONSTIPATION: 0
EYE ITCHING: 0
TROUBLE SWALLOWING: 0
VOMITING: 0
EYE DISCHARGE: 0
ABDOMINAL DISTENTION: 0
SORE THROAT: 0
NAUSEA: 0

## 2020-09-03 NOTE — PROGRESS NOTES
Eliceo Araya, a female of 38 y.o. came to the office 9/3/2020. Chief Complaint   Patient presents with    Results     Arterial US     SUBJECTIVE:      9/3/2020: Eliceo Araya returns for results of LE arterial to rule out arterial disease and venous US to rule out venous insufficiency. She is mostly Malay speaking and phone  used with her in agreement. Both US scans are negative for arterial disease and insufficiency. Her symtpoms persist without change including painful varicose vein to left Posterior and medial thigh and calf. Symptoms become worse to varicose vein if she is up on her feet for extended period of time. She states the lower extremity swelling is mild and only occasional.    8/20/2020: Eliceo Araya return for results of LE venous US. US negative for insufficiency bilaterally. Negative DVT's bilaterally. Patient states LE symptoms remain unchanged and persist.   Today she states she begins to have pain in both LE's after ambulating a short distance she is unsure as to how far. She must stop for relief of pain. States she continues to have numbness to toes 2-4 to left foot. Nothing exacerbates this. Happens randomly, occasionally. There persists painful varicose vein to left posterior leg. 8/4/2020 HPI: Eliceo Araya presents to clinic for consultation at the request of PCP for evaluation of LE painful varicosities. She is mostly Malay speaking and phone  used with her in agreement. Patient presents with symptoms of: Left lower leg pain below knee she describe as a burning, hot pain along with numbness to last three toes. Pain is concentrated mostly to medial aspect and posterior calf. Going on for several months with progression in symptoms. States there is also bilateral LE swelling with LLE > RLE and swelling becomes worse longer she is up on feet.    Painful varicose vein to posterior left distal thigh and medial knee causing throbbing when she is up on feet for long period of time. Denies painful VV to RLE  Denies LE fatigue, heaviness, or injury, calf pain. Affect on activities of daily living: difficulty sleeping due to pain. NSAIDS: PRN Advil with moderate relief. Leg elevation: daily with relief. Stocking use and dates: Has never done conservative therapy with compression socks. Claudication: denies       Review of Systems   Constitutional: Negative for activity change, appetite change, chills, fatigue and fever. HENT: Negative for congestion, sinus pressure, sinus pain, sore throat and trouble swallowing. Eyes: Negative for pain, discharge, redness and itching. Respiratory: Negative for cough, shortness of breath and wheezing. Cardiovascular: Positive for leg swelling (chronic bilateral LE with LLE > RLE). Negative for chest pain and palpitations. Painful varicose veins to LLE. Gastrointestinal: Negative for abdominal distention, abdominal pain, constipation, diarrhea, nausea and vomiting. Endocrine: Negative for cold intolerance and heat intolerance. Genitourinary: Negative for difficulty urinating, frequency, hematuria and urgency. Musculoskeletal: Negative for back pain, neck pain and neck stiffness. Chronic bilateral LE below knee aching. Skin: Negative for rash and wound. Neurological: Positive for numbness (left toes 2-4, occasional. ). Negative for dizziness, light-headedness and headaches. Hematological: Does not bruise/bleed easily. Psychiatric/Behavioral: The patient is not nervous/anxious. OBJECTIVE:  /62   Pulse 65   Resp 12   Ht 5' 4\" (1.626 m)   Wt 124 lb (56.2 kg)   BMI 21.28 kg/m²     Physical Exam  Nursing note reviewed. Constitutional:       General: She is not in acute distress. Appearance: Normal appearance. HENT:      Head: Normocephalic and atraumatic. Nose: Nose normal. No congestion.       Mouth/Throat: Mouth: Mucous membranes are moist.      Pharynx: Oropharynx is clear. Neck:      Musculoskeletal: Normal range of motion and neck supple. Cardiovascular:      Rate and Rhythm: Normal rate and regular rhythm. Pulses: Normal pulses. Popliteal pulses are 2+ on the right side and 2+ on the left side. Dorsalis pedis pulses are 2+ on the right side and 2+ on the left side. Posterior tibial pulses are 2+ on the right side and 2+ on the left side. Heart sounds: Normal heart sounds. Pulmonary:      Effort: Pulmonary effort is normal. No respiratory distress. Breath sounds: Normal breath sounds. No wheezing. Abdominal:      General: Bowel sounds are normal. There is no distension. Tenderness: There is no abdominal tenderness. Musculoskeletal:         General: Tenderness (LLE varicose veins with palpation ) present. Skin:     General: Skin is warm and dry. Capillary Refill: Capillary refill takes 2 to 3 seconds. Coloration: Skin is not pale. Findings: No erythema, lesion or rash. Neurological:      Mental Status: She is alert and oriented to person, place, and time. Psychiatric:         Mood and Affect: Mood normal.         Behavior: Behavior normal.       Narrative    EXAMINATION: US DUP LOWER ART/BYPASS GRAFTS BILATERAL COMPLETE         DATE AND TIME:8/28/2020 9:46 AM         CLINICAL HISTORY:   M79.669 Pain and swelling of lower leg, unspecified laterality ICD10         COMPARISON: None available.         Technique:  The following arteries were evaluated both legs: CFA, SFA, popliteal artery and a few of the runoff vessels.  The measurements of the systolic and diastolic velocities at the various levels of these arteries are noted in the graph below.         FINDINGS:         RIGHT LOWER EXTREMITY: No evidence of arterial disease in the right lower extremity.         LEFT LOWER EXTREMITY: No evidence of arterial disease in the left lower extremity.              Impression         NEGATIVE STUDY.                     Impression    1.  No signs of deep venous thrombosis in the bilateral lower extremity. 2.  No evidence of venous insufficiency in the bilateral legs. 3.  Varicose veins noted in the left calf.              COMPARISON:No prior studies are available for comparison.  .         DIAGNOSIS: M79.669 Pain and swelling of lower leg, unspecified laterality ICD10         COMMENTS: Leg pain and swelling with numbness         TECHNIQUE: Real-time scanning of the deep venous system of the bilateral lower extremity was performed and representative sections obtained.  Compression sonography as well as pulsed Doppler and color flow Doppler imaging was performed.         FINDINGS: There is no signs of deep venous thrombosis within the common femoral, superficial femoral or popliteal veins of the lower extremity.  No evidence of venous insufficiency in the bilateral legs. Varicose veins noted in the left calf.             ASSESSMENT AND PLAN:    Assessment:   1. Chronic bilateral LE aching, swelling with LLE > RLE. This may be musculoskeletal as both arterial and venous ultrasounds are negative. 2.  Painful Left leg varicose vein to posterior and medial thigh and calf  3. Occasional numbness to left 2-4 toes. As both arterial and venous ultrasounds are negative, would be a component of type of neuropathy. LE Venous US negative for DVT's and insufficiency bilaterally. LE arterial US negative for arterial disease. Plan:   1. Return in two months for evaluation of compression stockings for treatment of LLE painful varicose vein and occasional mild edema. Discussed in detail for varicose veins and purpose of compression stockings. She wishes to proceed for treatment of painful varicose vein to left leg. If no relief with compression stockings, will discuss need for possible venous procedure.   2. Rx for thigh high compression 20-30 mmhg to wear daily during the day and off nightly. Wear as tolerated. 3. Follow with neurology for evaluation of LLE toe numbness and tingling. She currently follows with Dr. Mone Aparicio, [facility] neurology.       JESU Shah - CNP

## 2020-09-04 NOTE — TELEPHONE ENCOUNTER
Pt presented in office, per request, CM met with pt. Pt was prescribed support hose for varicose veins, not covered under insurance. CM provided Rx Assistance card for possible assistance. CM will explore other resources as needed. CM provided support and encouragement.

## 2020-09-28 ENCOUNTER — TELEPHONE (OUTPATIENT)
Dept: INFECTIOUS DISEASES | Age: 45
End: 2020-09-28

## 2020-09-28 NOTE — TELEPHONE ENCOUNTER
Pt called into clinic requesting help with basic needs. Requesting food/gas voucher    Pt into clinc  Needs assessed. 2 gas cards given to pt. Provided face mask surgical x 5,  hand  and cleaning wipes, RW Part A Provision, to reduce the spread of Covid-19. Denies other needs at this time and will call with concerns and/or updates      Denies any needs at this time. Denies any issues that need Doctor attention.  Will call with concerns

## 2020-10-26 ENCOUNTER — TELEPHONE (OUTPATIENT)
Dept: INFECTIOUS DISEASES | Age: 45
End: 2020-10-26

## 2020-10-26 NOTE — TELEPHONE ENCOUNTER
Per request, CM scheduled transport to appointment with OB/GYNm 10/27/20 @ 9:15AM.  CM assessed need, made arrangements as last resort.

## 2020-10-27 ENCOUNTER — OFFICE VISIT (OUTPATIENT)
Dept: OBGYN CLINIC | Age: 45
End: 2020-10-27
Payer: COMMERCIAL

## 2020-10-27 VITALS
WEIGHT: 129 LBS | DIASTOLIC BLOOD PRESSURE: 52 MMHG | HEART RATE: 70 BPM | SYSTOLIC BLOOD PRESSURE: 104 MMHG | BODY MASS INDEX: 22.14 KG/M2

## 2020-10-27 PROCEDURE — 1036F TOBACCO NON-USER: CPT | Performed by: OBSTETRICS & GYNECOLOGY

## 2020-10-27 PROCEDURE — 99213 OFFICE O/P EST LOW 20 MIN: CPT | Performed by: OBSTETRICS & GYNECOLOGY

## 2020-10-27 PROCEDURE — G8420 CALC BMI NORM PARAMETERS: HCPCS | Performed by: OBSTETRICS & GYNECOLOGY

## 2020-10-27 PROCEDURE — G8427 DOCREV CUR MEDS BY ELIG CLIN: HCPCS | Performed by: OBSTETRICS & GYNECOLOGY

## 2020-10-27 PROCEDURE — G8484 FLU IMMUNIZE NO ADMIN: HCPCS | Performed by: OBSTETRICS & GYNECOLOGY

## 2020-10-27 ASSESSMENT — ENCOUNTER SYMPTOMS
VOICE CHANGE: 0
CHEST TIGHTNESS: 0
ABDOMINAL PAIN: 0
ABDOMINAL DISTENTION: 0
CONSTIPATION: 0
SHORTNESS OF BREATH: 0
COUGH: 0
SORE THROAT: 0
TROUBLE SWALLOWING: 0
VOMITING: 0
NAUSEA: 0
WHEEZING: 0
BACK PAIN: 0
COLOR CHANGE: 0
BLOOD IN STOOL: 0

## 2020-11-04 ENCOUNTER — TELEPHONE (OUTPATIENT)
Dept: INFECTIOUS DISEASES | Age: 45
End: 2020-11-04

## 2020-11-05 ENCOUNTER — OFFICE VISIT (OUTPATIENT)
Dept: INTERVENTIONAL RADIOLOGY/VASCULAR | Age: 45
End: 2020-11-05
Payer: COMMERCIAL

## 2020-11-05 VITALS
HEIGHT: 64 IN | DIASTOLIC BLOOD PRESSURE: 60 MMHG | SYSTOLIC BLOOD PRESSURE: 114 MMHG | WEIGHT: 129 LBS | RESPIRATION RATE: 12 BRPM | BODY MASS INDEX: 22.02 KG/M2 | HEART RATE: 73 BPM

## 2020-11-05 PROCEDURE — 99213 OFFICE O/P EST LOW 20 MIN: CPT | Performed by: NURSE PRACTITIONER

## 2020-11-05 PROCEDURE — 1036F TOBACCO NON-USER: CPT | Performed by: NURSE PRACTITIONER

## 2020-11-05 PROCEDURE — G8420 CALC BMI NORM PARAMETERS: HCPCS | Performed by: NURSE PRACTITIONER

## 2020-11-05 PROCEDURE — G8484 FLU IMMUNIZE NO ADMIN: HCPCS | Performed by: NURSE PRACTITIONER

## 2020-11-05 PROCEDURE — G8427 DOCREV CUR MEDS BY ELIG CLIN: HCPCS | Performed by: NURSE PRACTITIONER

## 2020-11-05 ASSESSMENT — ENCOUNTER SYMPTOMS
SHORTNESS OF BREATH: 0
ABDOMINAL PAIN: 0
SINUS PRESSURE: 0
SORE THROAT: 0
ABDOMINAL DISTENTION: 0
SINUS PAIN: 0
VOMITING: 0
COUGH: 0
WHEEZING: 0
DIARRHEA: 0
CONSTIPATION: 0
BACK PAIN: 0
EYE PAIN: 0
EYE REDNESS: 0
NAUSEA: 0
EYE DISCHARGE: 0
TROUBLE SWALLOWING: 0
EYE ITCHING: 0

## 2020-11-05 NOTE — PROGRESS NOTES
used with her in agreement. Patient presents with symptoms of: Left lower leg pain below knee she describe as a burning, hot pain along with numbness to last three toes. Pain is concentrated mostly to medial aspect and posterior calf. Going on for several months with progression in symptoms. States there is also bilateral LE swelling with LLE > RLE and swelling becomes worse longer she is up on feet. Painful varicose vein to posterior left distal thigh and medial knee causing throbbing when she is up on feet for long period of time. Denies painful VV to RLE  Denies LE fatigue, heaviness, or injury, calf pain. Affect on activities of daily living: difficulty sleeping due to pain. NSAIDS: PRN Advil with moderate relief. Leg elevation: daily with relief. Stocking use and dates: Has never done conservative therapy with compression socks. Claudication: denies       Review of Systems   Constitutional: Negative for activity change, appetite change, chills, fatigue and fever. HENT: Negative for congestion, sinus pressure, sinus pain, sore throat and trouble swallowing. Eyes: Negative for pain, discharge, redness and itching. Respiratory: Negative for cough, shortness of breath and wheezing. Cardiovascular: Positive for leg swelling (chronic bilateral LE with LLE > RLE. Now relieved with wearing compression stockings. ). Negative for chest pain and palpitations. Painful varicose veins to LLE. Now relieved with wearing compression stockings. Gastrointestinal: Negative for abdominal distention, abdominal pain, constipation, diarrhea, nausea and vomiting. Endocrine: Negative for cold intolerance and heat intolerance. Genitourinary: Negative for difficulty urinating, frequency, hematuria and urgency. Musculoskeletal: Negative for back pain, neck pain and neck stiffness. Skin: Negative for rash and wound.    Neurological: Negative for dizziness, light-headedness, numbness and headaches. Hematological: Does not bruise/bleed easily. Psychiatric/Behavioral: The patient is not nervous/anxious. OBJECTIVE:  /60   Pulse 73   Resp 12   Ht 5' 4\" (1.626 m)   Wt 129 lb (58.5 kg)   BMI 22.14 kg/m²     Physical Exam  Nursing note reviewed. Constitutional:       General: She is not in acute distress. Appearance: Normal appearance. HENT:      Head: Normocephalic and atraumatic. Nose: Nose normal. No congestion. Mouth/Throat:      Mouth: Mucous membranes are moist.      Pharynx: Oropharynx is clear. Neck:      Musculoskeletal: Normal range of motion and neck supple. Cardiovascular:      Rate and Rhythm: Normal rate and regular rhythm. Pulses: Normal pulses. Popliteal pulses are 2+ on the right side and 2+ on the left side. Dorsalis pedis pulses are 2+ on the right side and 2+ on the left side. Posterior tibial pulses are 2+ on the right side and 2+ on the left side. Heart sounds: Normal heart sounds. Pulmonary:      Effort: Pulmonary effort is normal. No respiratory distress. Breath sounds: Normal breath sounds. No wheezing. Abdominal:      General: Bowel sounds are normal. There is no distension. Tenderness: There is no abdominal tenderness. Musculoskeletal:         General: No tenderness. Skin:     General: Skin is warm and dry. Capillary Refill: Capillary refill takes 2 to 3 seconds. Coloration: Skin is not pale. Findings: No erythema, lesion or rash. Neurological:      Mental Status: She is alert and oriented to person, place, and time.    Psychiatric:         Mood and Affect: Mood normal.         Behavior: Behavior normal.       Narrative    EXAMINATION: US DUP LOWER ART/BYPASS GRAFTS BILATERAL COMPLETE         DATE AND TIME:8/28/2020 9:46 AM         CLINICAL HISTORY:   M79.669 Pain and swelling of lower leg, unspecified laterality ICD10         COMPARISON: None available.      Technique:  The following arteries were evaluated both legs: CFA, SFA, popliteal artery and a few of the runoff vessels. The measurements of the systolic and diastolic velocities at the various levels of these arteries are noted in the graph below.         FINDINGS:         RIGHT LOWER EXTREMITY: No evidence of arterial disease in the right lower extremity.         LEFT LOWER EXTREMITY: No evidence of arterial disease in the left lower extremity.              Impression         NEGATIVE STUDY.                     Impression    1.  No signs of deep venous thrombosis in the bilateral lower extremity. 2.  No evidence of venous insufficiency in the bilateral legs. 3.  Varicose veins noted in the left calf.              COMPARISON:No prior studies are available for comparison.  .         DIAGNOSIS: M79.669 Pain and swelling of lower leg, unspecified laterality ICD10         COMMENTS: Leg pain and swelling with numbness         TECHNIQUE: Real-time scanning of the deep venous system of the bilateral lower extremity was performed and representative sections obtained.  Compression sonography as well as pulsed Doppler and color flow Doppler imaging was performed.         FINDINGS: There is no signs of deep venous thrombosis within the common femoral, superficial femoral or popliteal veins of the lower extremity.  No evidence of venous insufficiency in the bilateral legs. Varicose veins noted in the left calf.             ASSESSMENT AND PLAN:    Assessment:   1. Chronic bilateral LE aching, swelling with LLE > RLE. This may be musculoskeletal as both arterial and venous ultrasounds are negative. Now relieved with wearing compression stockings. 2.  Painful Left leg varicose vein to posterior and medial thigh and calf. Now resolved with wearing compression stockings. 3. Occasional numbness to left 2-4 toes. Now resolved. LE Venous US negative for DVT's and insufficiency bilaterally.    LE arterial US negative for arterial disease. Plan:   1. As all of patient's symptoms are resolved to lower extremities, no further follow-up care required in vascular clinic. If in future compression stockings do not relieve symptoms of pain to left leg varicosities, patient can return for reevaluation for need for possible venous procedures. This discussed with the patient and she verbalizes understanding. More than 75% of face-to-face in office 15-minute visit done in counseling, coordination of care, and education.       Luz Echavarria, APRN - CNP

## 2020-12-01 ENCOUNTER — TELEPHONE (OUTPATIENT)
Dept: INFECTIOUS DISEASES | Age: 45
End: 2020-12-01

## 2020-12-01 NOTE — TELEPHONE ENCOUNTER
Per request, CM scheduled transport to appointment with Ob/GYN 12/3/20 @ 10AM and appointment with neurology 12/10/20 @ 3PM.  CM assessed need, made arrangements as last resort.

## 2020-12-03 ENCOUNTER — OFFICE VISIT (OUTPATIENT)
Dept: OBGYN CLINIC | Age: 45
End: 2020-12-03
Payer: COMMERCIAL

## 2020-12-03 VITALS
WEIGHT: 131 LBS | BODY MASS INDEX: 22.49 KG/M2 | HEART RATE: 64 BPM | SYSTOLIC BLOOD PRESSURE: 98 MMHG | DIASTOLIC BLOOD PRESSURE: 50 MMHG

## 2020-12-03 LAB
HCG, URINE, POC: NEGATIVE
Lab: NORMAL
NEGATIVE QC PASS/FAIL: NORMAL
POSITIVE QC PASS/FAIL: NORMAL

## 2020-12-03 PROCEDURE — 99213 OFFICE O/P EST LOW 20 MIN: CPT | Performed by: OBSTETRICS & GYNECOLOGY

## 2020-12-03 PROCEDURE — 1036F TOBACCO NON-USER: CPT | Performed by: OBSTETRICS & GYNECOLOGY

## 2020-12-03 PROCEDURE — 81025 URINE PREGNANCY TEST: CPT | Performed by: OBSTETRICS & GYNECOLOGY

## 2020-12-03 PROCEDURE — G8484 FLU IMMUNIZE NO ADMIN: HCPCS | Performed by: OBSTETRICS & GYNECOLOGY

## 2020-12-03 PROCEDURE — G8427 DOCREV CUR MEDS BY ELIG CLIN: HCPCS | Performed by: OBSTETRICS & GYNECOLOGY

## 2020-12-03 PROCEDURE — G8420 CALC BMI NORM PARAMETERS: HCPCS | Performed by: OBSTETRICS & GYNECOLOGY

## 2020-12-03 RX ORDER — MEDROXYPROGESTERONE ACETATE 10 MG/1
10 TABLET ORAL DAILY
Qty: 7 TABLET | Refills: 0 | Status: SHIPPED | OUTPATIENT
Start: 2020-12-03 | End: 2021-01-28 | Stop reason: SDUPTHER

## 2020-12-03 ASSESSMENT — ENCOUNTER SYMPTOMS
SHORTNESS OF BREATH: 0
BLOOD IN STOOL: 0
COLOR CHANGE: 0
WHEEZING: 0
CHEST TIGHTNESS: 0
ABDOMINAL DISTENTION: 0
VOICE CHANGE: 0
BACK PAIN: 0
NAUSEA: 0
VOMITING: 0
ABDOMINAL PAIN: 0
TROUBLE SWALLOWING: 0
SORE THROAT: 0
CONSTIPATION: 0
COUGH: 0

## 2020-12-03 NOTE — PROGRESS NOTES
Subjective: Patient had no spontaneous ovulation on 100 mg of Clomid day 5 through 9 last cycle. Last menstrual period 1022. UCG today is negative      Patient ID: Salud Rocha is a 41 y.o. female. HPI    Review of Systems   Constitutional: Negative for activity change, appetite change, fatigue and unexpected weight change. HENT: Negative for dental problem, ear pain, hearing loss, nosebleeds, sore throat, trouble swallowing and voice change. Eyes: Negative for visual disturbance. Respiratory: Negative for cough, chest tightness, shortness of breath and wheezing. Cardiovascular: Negative for chest pain and palpitations. Gastrointestinal: Negative for abdominal distention, abdominal pain, blood in stool, constipation, nausea and vomiting. Endocrine: Negative for cold intolerance, heat intolerance, polydipsia, polyphagia and polyuria. Genitourinary: Negative for difficulty urinating, dyspareunia, dysuria, flank pain, frequency, genital sores, hematuria, menstrual problem, pelvic pain, urgency, vaginal bleeding, vaginal discharge and vaginal pain. Musculoskeletal: Negative for arthralgias, back pain, joint swelling and myalgias. Skin: Negative for color change and rash. Allergic/Immunologic: Negative for environmental allergies, food allergies and immunocompromised state. Neurological: Negative for dizziness, seizures, syncope, speech difficulty, weakness, numbness and headaches. Hematological: Negative for adenopathy. Does not bruise/bleed easily. Psychiatric/Behavioral: Negative for agitation, behavioral problems, confusion, decreased concentration, dysphoric mood and suicidal ideas. The patient is not nervous/anxious and is not hyperactive. Objective:   Physical Exam  Vitals signs reviewed. Constitutional:       Appearance: She is normal weight. Neurological:      Mental Status: She is alert.          Assessment:    Oligo ovulation      Plan:      Provera 10 mg for 7 days. Clomid 150 mg day 5 through 9 of the next cycle.   Patient to follow-up in 1 month        Dave gM DO

## 2020-12-07 ENCOUNTER — VIRTUAL VISIT (OUTPATIENT)
Dept: FAMILY MEDICINE CLINIC | Age: 45
End: 2020-12-07
Payer: COMMERCIAL

## 2020-12-07 DIAGNOSIS — Z13.1 SCREENING FOR DIABETES MELLITUS: ICD-10-CM

## 2020-12-07 DIAGNOSIS — B20 HIV INFECTION, UNSPECIFIED SYMPTOM STATUS (HCC): ICD-10-CM

## 2020-12-07 PROBLEM — E63.9 NUTRITIONAL DEFICIENCY: Chronic | Status: RESOLVED | Noted: 2019-12-05 | Resolved: 2020-12-07

## 2020-12-07 PROBLEM — R55 SYNCOPE AND COLLAPSE: Status: RESOLVED | Noted: 2020-03-26 | Resolved: 2020-12-07

## 2020-12-07 PROBLEM — G31.84 MILD COGNITIVE IMPAIRMENT: Status: RESOLVED | Noted: 2020-03-26 | Resolved: 2020-12-07

## 2020-12-07 LAB
ALBUMIN SERPL-MCNC: 4 G/DL (ref 3.5–4.6)
ALP BLD-CCNC: 38 U/L (ref 40–130)
ALT SERPL-CCNC: 13 U/L (ref 0–33)
ANION GAP SERPL CALCULATED.3IONS-SCNC: 10 MEQ/L (ref 9–15)
AST SERPL-CCNC: 15 U/L (ref 0–35)
BILIRUB SERPL-MCNC: 0.3 MG/DL (ref 0.2–0.7)
BUN BLDV-MCNC: 12 MG/DL (ref 6–20)
CALCIUM SERPL-MCNC: 9.2 MG/DL (ref 8.5–9.9)
CHLORIDE BLD-SCNC: 104 MEQ/L (ref 95–107)
CO2: 24 MEQ/L (ref 20–31)
CREAT SERPL-MCNC: 0.68 MG/DL (ref 0.5–0.9)
GFR AFRICAN AMERICAN: >60
GFR NON-AFRICAN AMERICAN: >60
GLOBULIN: 3.5 G/DL (ref 2.3–3.5)
GLUCOSE BLD-MCNC: 93 MG/DL (ref 70–99)
HBA1C MFR BLD: 5.1 % (ref 4.8–5.9)
POTASSIUM SERPL-SCNC: 4.3 MEQ/L (ref 3.4–4.9)
SODIUM BLD-SCNC: 138 MEQ/L (ref 135–144)
TOTAL PROTEIN: 7.5 G/DL (ref 6.3–8)

## 2020-12-07 PROCEDURE — 99214 OFFICE O/P EST MOD 30 MIN: CPT | Performed by: FAMILY MEDICINE

## 2020-12-07 PROCEDURE — G8427 DOCREV CUR MEDS BY ELIG CLIN: HCPCS | Performed by: FAMILY MEDICINE

## 2020-12-07 RX ORDER — PRENATAL VIT 91/IRON/FOLIC/DHA 28-975-200
COMBINATION PACKAGE (EA) ORAL
Qty: 42 G | Refills: 5 | Status: SHIPPED | OUTPATIENT
Start: 2020-12-07 | End: 2021-12-09 | Stop reason: SDUPTHER

## 2020-12-07 NOTE — PROGRESS NOTES
2020    TELEHEALTH EVALUATION -- Audio/Visual (During LVNSD-51 public health emergency)    HPI:    Cherry Bernabe (:  1975) has requested an audio/video evaluation for the following concern(s): Interval history. Is undergoing fertility treatments supervised by Dr. Antony Quiroz. Patient is being treated for depression and has been compliant with meds which do not cause side effects. Mood is improved. No suicidal ideation. Sleep is normal.    HIV. No current complaints. Taking medication as indicated. Has not had follow-up with infectious disease in a while. Plans to make follow-up appointment. Tinea pedis. Has painful itchy blistered rash on feet. Responded well to topical terbinafine in the past.    Review of Systems No fevers, chills, sweats. No cough or sore throat. No unintended weight loss. No abdominal pain, nausea, vomiting, diarrhea, constipation, bloody stools, black tarry stools. No rashes. No swollen glands. Prior to Visit Medications    Medication Sig Taking? Authorizing Provider   terbinafine (LAMISIL) 1 % cream Apply topically 2 times daily.  Yes Jerome Hunt MD   medroxyPROGESTERone (PROVERA) 10 MG tablet Take 1 tablet by mouth daily Yes Dalila Ramon DO   Prenatal Vit-Fe Fumarate-FA (PRENATAL LOW IRON) 27-0.8 MG TABS Take 1 tablet by mouth daily Yes Jerome Hunt MD   SUMAtriptan (IMITREX) 50 MG tablet TAKE 1 TABLET BY MOUTH ONCE AS NEEDED FOR MIGRAINE Yes Historical Provider, MD   citalopram (CELEXA) 20 MG tablet Take 1 tablet by mouth daily Yes Jerome Hunt MD   topiramate (TOPAMAX) 25 MG tablet One qhs for 1 week, then 2  qhs for 1 week, then 3  qhs Yes Lisa Holloway MD   clomiPHENE (CLOMID) 50 MG tablet Take 3 tabs daily on days 5 through 9 x 5 days  Dalila Ramon DO   HYDROcodone-acetaminophen (NORCO) 5-325 MG per tablet TAKE 1 TABLET BY MOUTH EVERY 6 HOURS AS NEEDED FOR PAIN  Historical Provider, MD Cholecalciferol (VITAMIN D3) 125 MCG (5000 UT) CAPS Take 1 capsule by mouth daily  Patient not taking: Reported on 12/3/2020  Lisandro Sanchez MD   SUMAtriptan (IMITREX) 50 MG tablet Take 1 tablet by mouth once as needed for Migraine  Alejandro Osuna MD   MUCINEX 600 MG extended release tablet   Historical Provider, MD   Probiotic Product (ACIDOPHILUS/BIFIDUS PO) Take by mouth  Historical Provider, MD   dapsone 100 MG tablet   Historical Provider, MD   GENVOYA [phone] MG TABLET TAKE 1 TABLET DAILY  Historical Provider, MD       Social History     Tobacco Use    Smoking status: Never Smoker    Smokeless tobacco: Never Used   Substance Use Topics    Alcohol use: No     Comment: occasional    Drug use: No        Allergies   Allergen Reactions    Sulfa Antibiotics Swelling   ,   Past Medical History:   Diagnosis Date    Behavior disturbance     Encephalopathy 2/10/2019    HIV (human immunodeficiency virus infection) ([facility] 2/25/2019    Infection due to urethral catheter ([facility]     Iron deficiency anemia secondary to inadequate dietary iron intake 7/1/2019    Moderate malnutrition ([facility] 2/18/2019    Noncompliance     Obstructed fallopian tubes 10/31/2016    Oligoclonal bands in cerebrospinal fluid 7/1/2019   ,   Past Surgical History:   Procedure Laterality Date    CERVICAL CERCLAGE  10 yrs ago    3 pregnancies & 3 cerclages    HYSTEROSCOPY DIAGNOSTIC N/A 11/14/2016    HYSTEROSCOPY OPERATIVE  LAPAROSCOPY, Swedish SPEAKING  performed by Cordell Elliott DO at [address] [facility] Bilateral    ,   Social History     Tobacco Use    Smoking status: Never Smoker    Smokeless tobacco: Never Used   Substance Use Topics    Alcohol use: No     Comment: occasional    Drug use: No   ,   Family History   Problem Relation Age of Onset    High Blood Pressure Mother     Stroke Mother     Diabetes Mother     No Known Problems Father        PHYSICAL EXAMINATION:  [ INSTRUCTIONS:  \"[x]\" Indicates a positive item  \"[]\" Indicates a negative item  -- DELETE ALL ITEMS NOT EXAMINED]  Vital Signs: (As obtained by patient/caregiver or practitioner observation)    Blood pressure-  Heart rate-    Respiratory rate-    Temperature-  Pulse oximetry-     Constitutional: [x] Appears well-developed and well-nourished [x] No apparent distress      [] Abnormal-   Mental status  [x] Alert and awake  [x] Oriented to person/place/time [x]Able to follow commands      Eyes:  EOM    []  Normal  [] Abnormal-  Sclera  []  Normal  [] Abnormal -         Discharge []  None visible  [] Abnormal -    HENT:   [x] Normocephalic, atraumatic. [] Abnormal   [] Mouth/Throat: Mucous membranes are moist.     External Ears [x] Normal  [] Abnormal-     Neck: [x] No visualized mass     Pulmonary/Chest: [x] Respiratory effort normal.  [x] No visualized signs of difficulty breathing or respiratory distress        [] Abnormal-      Musculoskeletal:   [] Normal gait with no signs of ataxia         [] Normal range of motion of neck        [] Abnormal-       Neurological:        [x] No Facial Asymmetry (Cranial nerve 7 motor function) (limited exam to video visit)          [x] No gaze palsy        [] Abnormal-         Skin:        [x] No significant exanthematous lesions or discoloration noted on facial skin         [] Abnormal-            Psychiatric:       [x] Normal Affect [] No Hallucinations        [] Abnormal-     Other pertinent observable physical exam findings-     ASSESSMENT/PLAN:  Margaux Franco was seen today for depression, health maintenance and foot pain. Diagnoses and all orders for this visit:    Major depressive disorder with single episode, in full remission ([facility]  Comments:  Stable and well-controlled on current medication. Tinea pedis, unspecified laterality  Comments:  Topical terbinafine. Consider oral.  Orders:  -     terbinafine (LAMISIL) 1 % cream; Apply topically 2 times daily.     HIV infection, unspecified symptom status ([facility]  Comments:  Stable and well-controlled on current meds. Patient to follow-up with ID. Labs ordered. Orders:  -     Comprehensive Metabolic Panel; Future  -     HIV-1, Quantitative, Molecular; Future  -     T-Gordon Cells (Cd4) Count; Future    Screening for diabetes mellitus  -     Hemoglobin A1c; Future          Return in about 6 months (around 6/7/2021) for Mood Disorder, ID - OV. Astrid Cordon is a 41 y.o. female being evaluated by a Virtual Visit (video visit) encounter to address concerns as mentioned above. A caregiver was present when appropriate. Due to this being a TeleHealth encounter (During LQYKM-17 public health emergency), evaluation of the following organ systems was limited: Vitals/Constitutional/EENT/Resp/CV/GI//MS/Neuro/Skin/Heme-Lymph-Imm. Pursuant to the emergency declaration under the [address] [place] [address], [address] [place] [address] and the [employer] Act, this Virtual Visit was conducted with patient's (and/or legal guardian's) consent, to reduce the patient's risk of exposure to COVID-19 and provide necessary medical care. The patient (and/or legal guardian) has also been advised to contact this office for worsening conditions or problems, and seek emergency medical treatment and/or call 911 if deemed necessary. Patient identification was verified at the start of the visit: Yes    Total time spent on this encounter: Not billed by time    Services were provided through a video synchronous discussion virtually to substitute for in-person clinic visit. Patient and provider were located at their individual homes. --Byron Humphrey MD on 12/7/2020 at 12:20 PM    An electronic signature was used to authenticate this note.

## 2020-12-09 LAB
ABSOLUTE CD 4 HELPER: 463 CELLS/UL (ref 430–1800)
CD4 % HELPER T CELL: 29 % (ref 32–64)
LYMPHOCYTE SUBSET PANEL 2 INFO: ABNORMAL

## 2020-12-10 ENCOUNTER — OFFICE VISIT (OUTPATIENT)
Dept: NEUROLOGY | Age: 45
End: 2020-12-10
Payer: COMMERCIAL

## 2020-12-10 VITALS
BODY MASS INDEX: 22.92 KG/M2 | WEIGHT: 133.5 LBS | HEART RATE: 74 BPM | SYSTOLIC BLOOD PRESSURE: 103 MMHG | DIASTOLIC BLOOD PRESSURE: 76 MMHG

## 2020-12-10 PROCEDURE — 1036F TOBACCO NON-USER: CPT | Performed by: PSYCHIATRY & NEUROLOGY

## 2020-12-10 PROCEDURE — G8484 FLU IMMUNIZE NO ADMIN: HCPCS | Performed by: PSYCHIATRY & NEUROLOGY

## 2020-12-10 PROCEDURE — G8427 DOCREV CUR MEDS BY ELIG CLIN: HCPCS | Performed by: PSYCHIATRY & NEUROLOGY

## 2020-12-10 PROCEDURE — G8420 CALC BMI NORM PARAMETERS: HCPCS | Performed by: PSYCHIATRY & NEUROLOGY

## 2020-12-10 PROCEDURE — 99214 OFFICE O/P EST MOD 30 MIN: CPT | Performed by: PSYCHIATRY & NEUROLOGY

## 2020-12-10 RX ORDER — SUMATRIPTAN 100 MG/1
100 TABLET, FILM COATED ORAL
Qty: 9 TABLET | Refills: 3 | Status: SHIPPED | OUTPATIENT
Start: 2020-12-10 | End: 2021-12-09 | Stop reason: SINTOL

## 2020-12-10 RX ORDER — TOPIRAMATE 25 MG/1
TABLET ORAL
Qty: 90 TABLET | Refills: 3 | Status: SHIPPED | OUTPATIENT
Start: 2020-12-10 | End: 2021-06-10

## 2020-12-10 ASSESSMENT — ENCOUNTER SYMPTOMS
COLOR CHANGE: 0
SHORTNESS OF BREATH: 0
VOMITING: 0
NAUSEA: 0
BACK PAIN: 0
TROUBLE SWALLOWING: 0
PHOTOPHOBIA: 0
CHOKING: 0

## 2020-12-10 NOTE — PROGRESS NOTES
Subjective:      Patient ID: Sandrine Bhardwaj is a 41 y.o. female who presents today for:  Chief Complaint   Patient presents with    Follow-up     Patient states she has been feeling better, but if she looks into to much lighting or it will cause a headache, or if she looks at a screen for to long it gives her headaches. She says the pains are mostly on the left side of her head going from the front to the back.  Other     Patient is requesting to have a refferal to possible get glasses. HPI 81-year-old right-handed female history of chronic migraine headaches. Patient does not speak the language and therefore is a translation and extended evaluation. She is on topiramate and sumatriptan. She has headaches mostly on the left side. Patient was seen for encephalopathy and we had obtained a lumbar puncture with abnormal MRI and showed positive oligoclonal bands consistent with underlying HIV infection. She was newly diagnosed. Since then patient has been seen by multiple neurologist.  Patient was referred to [facility] by someone again they were in agreement with my evaluations. And she does not have  demyelination as seen  multiple sclerosis. She was here for headaches and she did not continue topiramate or sumatriptan so we are not quite sure if it worked or not worked. She still has the same headache she has some nausea with his photophobia and she does not wake up in the middle of the night. Translation is obtained.     Past Medical History:   Diagnosis Date    Behavior disturbance     Encephalopathy 2/10/2019    HIV (human immunodeficiency virus infection) ([address] 2/25/2019    Infection due to urethral catheter (HCC)     Iron deficiency anemia secondary to inadequate dietary iron intake 7/1/2019    Moderate malnutrition ([address] 2/18/2019    Noncompliance     Obstructed fallopian tubes 10/31/2016    Oligoclonal bands in cerebrospinal fluid 7/1/2019     Past Surgical History: Procedure Laterality Date    CERVICAL CERCLAGE  10 yrs ago    3 pregnancies & 3 cerclages    HYSTEROSCOPY DIAGNOSTIC N/A 11/14/2016    HYSTEROSCOPY OPERATIVE  LAPAROSCOPY, Slovenian SPEAKING  performed by Leandra Barros DO at [address] [facility] [address] Bilateral      Social History     Socioeconomic History    Marital status: Single     Spouse name: Not on file    Number of children: Not on file    Years of education: Not on file    Highest education level: Not on file   Occupational History    Not on file   Social Needs    Financial resource strain: Not on file    Food insecurity     Worry: Not on file     Inability: Not on file    Transportation needs     Medical: Not on file     Non-medical: Not on file   Tobacco Use    Smoking status: Never Smoker    Smokeless tobacco: Never Used   Substance and Sexual Activity    Alcohol use: No     Comment: occasional    Drug use: No    Sexual activity: Not on file   Lifestyle    Physical activity     Days per week: Not on file     Minutes per session: Not on file    Stress: Not on file   Relationships    Social connections     Talks on phone: Not on file     Gets together: Not on file     Attends Yarsanism service: Not on file     Active member of club or organization: Not on file     Attends meetings of clubs or organizations: Not on file     Relationship status: Not on file    Intimate partner violence     Fear of current or ex partner: Not on file     Emotionally abused: Not on file     Physically abused: Not on file     Forced sexual activity: Not on file   Other Topics Concern    Not on file   Social History Narrative    Not on file     Family History   Problem Relation Age of Onset    High Blood Pressure Mother     Stroke Mother     Diabetes Mother     No Known Problems Father      Allergies   Allergen Reactions    Sulfa Antibiotics Swelling       Current Outpatient Medications   Medication Sig Dispense Refill    terbinafine (LAMISIL) 1 % cream Apply topically 2 times daily. 42 g 5    medroxyPROGESTERone (PROVERA) 10 MG tablet Take 1 tablet by mouth daily 7 tablet 0    clomiPHENE (CLOMID) 50 MG tablet Take 3 tabs daily on days 5 through 9 x 5 days 15 tablet 0    Prenatal Vit-Fe Fumarate-FA (PRENATAL LOW IRON) 27-0.8 MG TABS Take 1 tablet by mouth daily 30 tablet 12    citalopram (CELEXA) 20 MG tablet Take 1 tablet by mouth daily 30 tablet 6    dapsone 100 MG tablet       GENVOYA [phone] MG TABLET TAKE 1 TABLET DAILY  2    HYDROcodone-acetaminophen (NORCO) 5-325 MG per tablet TAKE 1 TABLET BY MOUTH EVERY 6 HOURS AS NEEDED FOR PAIN      Cholecalciferol (VITAMIN D3) 125 MCG (5000 UT) CAPS Take 1 capsule by mouth daily (Patient not taking: Reported on 12/3/2020) 30 capsule 11    SUMAtriptan (IMITREX) 50 MG tablet TAKE 1 TABLET BY MOUTH ONCE AS NEEDED FOR MIGRAINE      topiramate (TOPAMAX) 25 MG tablet One qhs for 1 week, then 2  qhs for 1 week, then 3  qhs (Patient not taking: Reported on 12/10/2020) 90 tablet 3    SUMAtriptan (IMITREX) 50 MG tablet Take 1 tablet by mouth once as needed for Migraine 9 tablet 3    MUCINEX 600 MG extended release tablet       Probiotic Product (ACIDOPHILUS/BIFIDUS PO) Take by mouth       No current facility-administered medications for this visit. Review of Systems   Constitutional: Negative for fever. HENT: Negative for ear pain, tinnitus and trouble swallowing. Eyes: Negative for photophobia and visual disturbance. Respiratory: Negative for choking and shortness of breath. Cardiovascular: Negative for chest pain and palpitations. Gastrointestinal: Negative for nausea and vomiting. Musculoskeletal: Negative for back pain, gait problem, joint swelling, myalgias, neck pain and neck stiffness. Skin: Negative for color change. Allergic/Immunologic: Negative for food allergies. Neurological: Positive for light-headedness and headaches.  Negative for dizziness, tremors, seizures, syncope, facial asymmetry, speech difficulty, weakness and numbness. Psychiatric/Behavioral: Negative for behavioral problems, confusion, hallucinations and sleep disturbance. Objective:   /76 (Site: Left Upper Arm, Position: Sitting, Cuff Size: Medium Adult)   Pulse 74   Wt 133 lb 8 oz (60.6 kg)   BMI 22.92 kg/m²     Physical Exam  Vitals signs reviewed. Eyes:      Pupils: Pupils are equal, round, and reactive to light. Neck:      Musculoskeletal: Normal range of motion. Cardiovascular:      Rate and Rhythm: Normal rate and regular rhythm. Heart sounds: No murmur. Pulmonary:      Effort: Pulmonary effort is normal.      Breath sounds: Normal breath sounds. Abdominal:      General: Bowel sounds are normal.   Musculoskeletal: Normal range of motion. Skin:     General: Skin is warm. Neurological:      Mental Status: She is alert and oriented to person, place, and time. Cranial Nerves: No cranial nerve deficit. Sensory: No sensory deficit. Motor: No abnormal muscle tone. Coordination: Coordination normal.      Deep Tendon Reflexes: Reflexes are normal and symmetric. Babinski sign absent on the right side. Babinski sign absent on the left side. Psychiatric:         Mood and Affect: Mood normal.         Us Non Ob Transvaginal    Result Date: 3/10/2020  EXAMINATION:  US PELVIS COMPLETE, US NON OB TRANSVAGINAL HISTORY:  R10.2 Pelvic pain ICD10. Left lower quadrant pain. COMPARISON: Ultrasound 2/10/2019. TECHNIQUE: Sonography of the pelvis was performed by transvaginal and transabdominal techniques. .  Images were obtained and stored in a permanent archive. RESULT: Uterus:      -Orientation: Anteverted      -Size: 9.6 x 5.8 x 5.2 cm      -Myometrium: Homogeneous echotexture.      -Endometrial echo complex: 2.1 cm within the fundal region      -Cervix: Nabothian cysts, otherwise unremarkable. Right ovary: Normal sonographic appearance with 2.0 cm simple cyst/follicle. Deena Whitmore -Size: 3.4 x 3.8 x 2.1 cm      -Complex cyst: None.      -Solid mass: None. -Doppler evaluation: Arterial and venous flow with normal spectral waveforms present. Left ovary: Normal sonographic appearance.      -Size:  3.8 x 3.0 x 2.0 cm      -Complex cyst: None.      -Solid mass: None. -Doppler evaluation: Arterial and venous flow with normal spectral waveforms present. Free fluid: None. Nonspecific thickened endometrial echo complex measuring up to 2.1 cm. Unremarkable appearance of the ovaries. Us Pelvis Complete    Result Date: 3/10/2020  EXAMINATION:  US PELVIS COMPLETE, US NON OB TRANSVAGINAL HISTORY:  R10.2 Pelvic pain ICD10. Left lower quadrant pain. COMPARISON: Ultrasound 2/10/2019. TECHNIQUE: Sonography of the pelvis was performed by transvaginal and transabdominal techniques. .  Images were obtained and stored in a permanent archive. RESULT: Uterus:      -Orientation: Anteverted      -Size: 9.6 x 5.8 x 5.2 cm      -Myometrium: Homogeneous echotexture.      -Endometrial echo complex: 2.1 cm within the fundal region      -Cervix: Nabothian cysts, otherwise unremarkable. Right ovary: Normal sonographic appearance with 2.0 cm simple cyst/follicle. .      -Size:  3.4 x 3.8 x 2.1 cm      -Complex cyst: None.      -Solid mass: None. -Doppler evaluation: Arterial and venous flow with normal spectral waveforms present. Left ovary: Normal sonographic appearance.      -Size:  3.8 x 3.0 x 2.0 cm      -Complex cyst: None.      -Solid mass: None. -Doppler evaluation: Arterial and venous flow with normal spectral waveforms present. Free fluid: None. Nonspecific thickened endometrial echo complex measuring up to 2.1 cm. Unremarkable appearance of the ovaries.        Lab Results   Component Value Date    WBC 4.6 08/04/2020    RBC 4.56 08/04/2020    HGB 14.2 08/04/2020    HCT 42.0 08/04/2020    MCV 92.3 08/04/2020    MCH 31.1 08/04/2020    MCHC 33.7 08/04/2020    RDW 12.2 08/04/2020 that she was HIV positive and CSF findings were obtained due to abnormal MRI. The patient had positive oligoclonal bands though these are seen in any infective process with disruption of the blood-brain barrier and I truly felt that this was from HIV as her MRI was not quite suggestive of demyelination seen multiple sclerosis. Somewhere along the line patient was referred to [facility] center by someone and she had the same description and agreement as what we had said. There is not any further encephalopathy and continues to do very well otherwise in terms of her infective process. We will see her back in 3 months with diary of events and will consider CGRP blockers if the medication does not help  Translation was obtained for this patient and the for the time taken and as of this was quite extended. Plan:      No orders of the defined types were placed in this encounter. No orders of the defined types were placed in this encounter. No follow-ups on file.       Hero Payne MD

## 2021-01-15 ENCOUNTER — NURSE ONLY (OUTPATIENT)
Dept: FAMILY MEDICINE CLINIC | Age: 46
End: 2021-01-15
Payer: COMMERCIAL

## 2021-01-15 DIAGNOSIS — Z23 FLU VACCINE NEED: Primary | ICD-10-CM

## 2021-01-15 PROCEDURE — 90688 IIV4 VACCINE SPLT 0.5 ML IM: CPT | Performed by: FAMILY MEDICINE

## 2021-01-15 PROCEDURE — 90471 IMMUNIZATION ADMIN: CPT | Performed by: FAMILY MEDICINE

## 2021-01-15 NOTE — PROGRESS NOTES
Vaccine Information Sheet, \"Influenza - Inactivated\"  given to [employer], or parent/legal guardian of  [employer] and verbalized understanding. Patient responses:    Have you ever had a reaction to a flu vaccine? No  Are you able to eat eggs without adverse effects? Yes  Do you have any current illness? No  Have you ever had [place] Syndrome? No    Flu vaccine given per order. Please see immunization tab. After obtaining consent, and per orders of Dr. Carlos Davis, injection of reg flu given in Left deltoid by Sylwia Leung. Patient instructed to remain in clinic for 20 minutes afterwards, and to report any adverse reaction to me immediately.

## 2021-01-21 ENCOUNTER — OFFICE VISIT (OUTPATIENT)
Dept: OBGYN CLINIC | Age: 46
End: 2021-01-21
Payer: COMMERCIAL

## 2021-01-21 VITALS — WEIGHT: 131 LBS | SYSTOLIC BLOOD PRESSURE: 90 MMHG | BODY MASS INDEX: 22.49 KG/M2 | DIASTOLIC BLOOD PRESSURE: 52 MMHG

## 2021-01-21 DIAGNOSIS — N92.6 MENSTRUAL ABNORMALITY: Primary | ICD-10-CM

## 2021-01-21 PROCEDURE — 1036F TOBACCO NON-USER: CPT | Performed by: OBSTETRICS & GYNECOLOGY

## 2021-01-21 PROCEDURE — G8427 DOCREV CUR MEDS BY ELIG CLIN: HCPCS | Performed by: OBSTETRICS & GYNECOLOGY

## 2021-01-21 PROCEDURE — G8482 FLU IMMUNIZE ORDER/ADMIN: HCPCS | Performed by: OBSTETRICS & GYNECOLOGY

## 2021-01-21 PROCEDURE — G8420 CALC BMI NORM PARAMETERS: HCPCS | Performed by: OBSTETRICS & GYNECOLOGY

## 2021-01-21 PROCEDURE — 99213 OFFICE O/P EST LOW 20 MIN: CPT | Performed by: OBSTETRICS & GYNECOLOGY

## 2021-01-21 ASSESSMENT — ENCOUNTER SYMPTOMS
SHORTNESS OF BREATH: 0
COUGH: 0
BLOOD IN STOOL: 0
TROUBLE SWALLOWING: 0
ABDOMINAL DISTENTION: 0
ABDOMINAL PAIN: 0
BACK PAIN: 0
SORE THROAT: 0
COLOR CHANGE: 0
WHEEZING: 0
VOICE CHANGE: 0
NAUSEA: 0
CHEST TIGHTNESS: 0
CONSTIPATION: 0
VOMITING: 0

## 2021-01-21 NOTE — PROGRESS NOTES
Katia Barba (:  1975) is a 41 y.o. female,Established patient, here for evaluation of the following chief complaint(s): Infertility (took provera and clomid)  Christine Wilde had a positive withdrawal on  following 10 days of Provera. She took 150 mg of Clomid day 5 through 9. She has not had a period yet  ASSESSMENT/PLAn  oligoovulation. Plan is to have the patient take 200 mg day 5 through 9 of her if her cycle starts anywhere from now until next week. If she has not had a cycle spontaneously by next Thursday which would be day 40 she is to return here we will do a UCG if negative Provera and 200 mg of Clomid    No follow-ups on file. SUBJECTIVE/OBJECTIVE:  HPI    Review of Systems   Constitutional: Negative for activity change, appetite change, fatigue and unexpected weight change. HENT: Negative for dental problem, ear pain, hearing loss, nosebleeds, sore throat, trouble swallowing and voice change. Eyes: Negative for visual disturbance. Respiratory: Negative for cough, chest tightness, shortness of breath and wheezing. Cardiovascular: Negative for chest pain and palpitations. Gastrointestinal: Negative for abdominal distention, abdominal pain, blood in stool, constipation, nausea and vomiting. Endocrine: Negative for cold intolerance, heat intolerance, polydipsia, polyphagia and polyuria. Genitourinary: Negative for difficulty urinating, dyspareunia, dysuria, flank pain, frequency, genital sores, hematuria, menstrual problem, pelvic pain, urgency, vaginal bleeding, vaginal discharge and vaginal pain. Musculoskeletal: Negative for arthralgias, back pain, joint swelling and myalgias. Skin: Negative for color change and rash. Allergic/Immunologic: Negative for environmental allergies, food allergies and immunocompromised state. Neurological: Negative for dizziness, seizures, syncope, speech difficulty, weakness, numbness and headaches.    Hematological: Negative for adenopathy. Does not bruise/bleed easily. Psychiatric/Behavioral: Negative for agitation, behavioral problems, confusion, decreased concentration, dysphoric mood and suicidal ideas. The patient is not nervous/anxious and is not hyperactive. Physical Exam  Vitals signs reviewed. Exam conducted with a chaperone present. An electronic signature was used to authenticate this note.     --Froylan Gonzalez, DO

## 2021-01-28 ENCOUNTER — OFFICE VISIT (OUTPATIENT)
Dept: OBGYN CLINIC | Age: 46
End: 2021-01-28
Payer: COMMERCIAL

## 2021-01-28 VITALS
HEART RATE: 80 BPM | SYSTOLIC BLOOD PRESSURE: 100 MMHG | WEIGHT: 136 LBS | HEIGHT: 64 IN | BODY MASS INDEX: 23.22 KG/M2 | DIASTOLIC BLOOD PRESSURE: 62 MMHG

## 2021-01-28 DIAGNOSIS — N92.6 MENSTRUAL ABNORMALITY: Primary | ICD-10-CM

## 2021-01-28 LAB
HCG, URINE, POC: NEGATIVE
Lab: NORMAL
NEGATIVE QC PASS/FAIL: NORMAL
POSITIVE QC PASS/FAIL: NORMAL

## 2021-01-28 PROCEDURE — 81025 URINE PREGNANCY TEST: CPT | Performed by: OBSTETRICS & GYNECOLOGY

## 2021-01-28 PROCEDURE — G8420 CALC BMI NORM PARAMETERS: HCPCS | Performed by: OBSTETRICS & GYNECOLOGY

## 2021-01-28 PROCEDURE — G8482 FLU IMMUNIZE ORDER/ADMIN: HCPCS | Performed by: OBSTETRICS & GYNECOLOGY

## 2021-01-28 PROCEDURE — G8427 DOCREV CUR MEDS BY ELIG CLIN: HCPCS | Performed by: OBSTETRICS & GYNECOLOGY

## 2021-01-28 PROCEDURE — 99213 OFFICE O/P EST LOW 20 MIN: CPT | Performed by: OBSTETRICS & GYNECOLOGY

## 2021-01-28 PROCEDURE — 1036F TOBACCO NON-USER: CPT | Performed by: OBSTETRICS & GYNECOLOGY

## 2021-01-28 RX ORDER — MEDROXYPROGESTERONE ACETATE 10 MG/1
10 TABLET ORAL DAILY
Qty: 10 TABLET | Refills: 0 | Status: SHIPPED | OUTPATIENT
Start: 2021-01-28 | End: 2021-06-10

## 2021-01-28 ASSESSMENT — ENCOUNTER SYMPTOMS
VOICE CHANGE: 0
COUGH: 0
CHEST TIGHTNESS: 0
WHEEZING: 0
VOMITING: 0
ABDOMINAL DISTENTION: 0
CONSTIPATION: 0
COLOR CHANGE: 0
SHORTNESS OF BREATH: 0
ABDOMINAL PAIN: 0
BLOOD IN STOOL: 0
BACK PAIN: 0
NAUSEA: 0
TROUBLE SWALLOWING: 0
SORE THROAT: 0

## 2021-01-28 NOTE — PROGRESS NOTES
Una Astudillo (:  1975) is a 41 y.o. female,Established patient, here for evaluation of the following chief complaint(s):  Follow-up (infertility/clomid)  [facility] is here on day 36. She has not had a spontaneous cycle yet. UCG is negative    ASSESSMENT/PLAN:  1. Menstrual abnormality  -     POC Pregnancy Ur-Qual  oligo ovulation  Provera 10 mg 1 daily for 10 days followed by 200 mg of Clomid day 5 through 9 patient to follow-up in 5 to 6 weeks  No follow-ups on file. SUBJECTIVE/OBJECTIVE:  HPI    Review of Systems   Constitutional: Negative for activity change, appetite change, fatigue and unexpected weight change. HENT: Negative for dental problem, ear pain, hearing loss, nosebleeds, sore throat, trouble swallowing and voice change. Eyes: Negative for visual disturbance. Respiratory: Negative for cough, chest tightness, shortness of breath and wheezing. Cardiovascular: Negative for chest pain and palpitations. Gastrointestinal: Negative for abdominal distention, abdominal pain, blood in stool, constipation, nausea and vomiting. Endocrine: Negative for cold intolerance, heat intolerance, polydipsia, polyphagia and polyuria. Genitourinary: Negative for difficulty urinating, dyspareunia, dysuria, flank pain, frequency, genital sores, hematuria, menstrual problem, pelvic pain, urgency, vaginal bleeding, vaginal discharge and vaginal pain. Musculoskeletal: Negative for arthralgias, back pain, joint swelling and myalgias. Skin: Negative for color change and rash. Allergic/Immunologic: Negative for environmental allergies, food allergies and immunocompromised state. Neurological: Negative for dizziness, seizures, syncope, speech difficulty, weakness, numbness and headaches. Hematological: Negative for adenopathy. Does not bruise/bleed easily.    Psychiatric/Behavioral: Negative for agitation, behavioral problems, confusion, decreased concentration, dysphoric mood and suicidal ideas. The patient is not nervous/anxious and is not hyperactive. Physical Exam  Vitals signs reviewed. Exam conducted with a chaperone present. Neurological:      Mental Status: She is alert. An electronic signature was used to authenticate this note.     --Lisa Toth, DO

## 2021-02-09 ENCOUNTER — TELEPHONE (OUTPATIENT)
Dept: INFECTIOUS DISEASES | Age: 46
End: 2021-02-09

## 2021-02-09 NOTE — TELEPHONE ENCOUNTER
Call placed to pt regarding need to update RW eligibility and paperwork. Spoke with partner as pt is Pashto speaking only. Reviewed all needed documentation for update. This includes proof of income ( 1 month total, most recent) proof of residency and ID. -- no income. pt is aware of the program, verbalized understanding and denies questions.      YASIR apt made 2.16.2021    Denies other issues or concerns and will call office if needed

## 2021-02-16 ENCOUNTER — NURSE ONLY (OUTPATIENT)
Dept: INFECTIOUS DISEASES | Age: 46
End: 2021-02-16

## 2021-02-16 NOTE — PROGRESS NOTES
Re-Assessment      Patient ID:   Samantha Henderson  Date:   2/16/2021      Client ID:  [id number]    [address]  [phone] (home)     Family/House:    [] Partner  [] Children  [] Other -     Mental Health:   Hx of Depresson    Substance Abuse:   Hx of marijuana use  Social History     Socioeconomic History    Marital status: Single     Spouse name: Not on file    Number of children: Not on file    Years of education: Not on file    Highest education level: Not on file   Occupational History    Not on file   Social Needs    Financial resource strain: Not on file    Food insecurity     Worry: Not on file     Inability: Not on file    Transportation needs     Medical: Not on file     Non-medical: Not on file   Tobacco Use    Smoking status: Never Smoker    Smokeless tobacco: Never Used   Substance and Sexual Activity    Alcohol use: No     Comment: occasional    Drug use: No    Sexual activity: Not on file   Lifestyle    Physical activity     Days per week: Not on file     Minutes per session: Not on file    Stress: Not on file   Relationships    Social connections     Talks on phone: Not on file     Gets together: Not on file     Attends Mosque service: Not on file     Active member of club or organization: Not on file     Attends meetings of clubs or organizations: Not on file     Relationship status: Not on file    Intimate partner violence     Fear of current or ex partner: Not on file     Emotionally abused: Not on file     Physically abused: Not on file     Forced sexual activity: Not on file   Other Topics Concern    Not on file   Social History Narrative    Not on file       Housing:   with family    Health/Healthcare:  HIV Stable   Physician Visit:  Dr. Татьяна Patel 9/20   Dental Visit:  CM encouraged follow up    CD4:   Lab Results   Component Value Date    LABABSO 463 12/07/2020       Viral Load:  Lab Results   Component Value Date    [id number] Not

## 2021-03-24 ENCOUNTER — TELEPHONE (OUTPATIENT)
Dept: INFECTIOUS DISEASES | Age: 46
End: 2021-03-24

## 2021-03-24 NOTE — TELEPHONE ENCOUNTER
Per request, CM provided PPE and food vouchers via [employer].  CM assessed need, provisions as last resort.

## 2021-04-19 ENCOUNTER — TELEPHONE (OUTPATIENT)
Dept: INFECTIOUS DISEASES | Age: 46
End: 2021-04-19

## 2021-04-19 NOTE — TELEPHONE ENCOUNTER
Per request, CM scheduled appointment for follow up appointment with Dr. Jessenia Baker, OB-Gyn, 4/30/21 @ 9:45AM.  CM will follow up with pt as needed.

## 2021-04-29 ENCOUNTER — TELEPHONE (OUTPATIENT)
Dept: INFECTIOUS DISEASES | Age: 46
End: 2021-04-29

## 2021-04-29 DIAGNOSIS — B20 HIV INFECTION, UNSPECIFIED SYMPTOM STATUS (HCC): Primary | ICD-10-CM

## 2021-04-29 NOTE — TELEPHONE ENCOUNTER
Call placed to pt as appt reminder. she  also needs lab work completed. Reviewed labs ordered. she will go to [facility] out pt lab this week. Partner will take her. Reviewed appt date and time. Denies issues with appt day. -- V.V is ok. Requested he call if issues arise regarding apt. Denies other needs or concerns that Dr Loretta Sanders needs to address.

## 2021-04-30 ENCOUNTER — OFFICE VISIT (OUTPATIENT)
Dept: OBGYN CLINIC | Age: 46
End: 2021-04-30
Payer: COMMERCIAL

## 2021-04-30 VITALS
SYSTOLIC BLOOD PRESSURE: 118 MMHG | WEIGHT: 136 LBS | HEART RATE: 83 BPM | DIASTOLIC BLOOD PRESSURE: 64 MMHG | BODY MASS INDEX: 23.34 KG/M2

## 2021-04-30 DIAGNOSIS — N64.4 BREAST PAIN: Primary | ICD-10-CM

## 2021-04-30 DIAGNOSIS — R10.2 PELVIC PAIN: ICD-10-CM

## 2021-04-30 DIAGNOSIS — Z11.51 ENCOUNTER FOR SCREENING FOR HUMAN PAPILLOMAVIRUS (HPV): ICD-10-CM

## 2021-04-30 DIAGNOSIS — N97.0 OLIGO-OVULATION: ICD-10-CM

## 2021-04-30 DIAGNOSIS — Z01.419 WOMEN'S ANNUAL ROUTINE GYNECOLOGICAL EXAMINATION: ICD-10-CM

## 2021-04-30 PROCEDURE — G8420 CALC BMI NORM PARAMETERS: HCPCS | Performed by: OBSTETRICS & GYNECOLOGY

## 2021-04-30 PROCEDURE — 99213 OFFICE O/P EST LOW 20 MIN: CPT | Performed by: OBSTETRICS & GYNECOLOGY

## 2021-04-30 PROCEDURE — 1036F TOBACCO NON-USER: CPT | Performed by: OBSTETRICS & GYNECOLOGY

## 2021-04-30 PROCEDURE — G8427 DOCREV CUR MEDS BY ELIG CLIN: HCPCS | Performed by: OBSTETRICS & GYNECOLOGY

## 2021-04-30 RX ORDER — MEDROXYPROGESTERONE ACETATE 10 MG/1
10 TABLET ORAL DAILY
Qty: 10 TABLET | Refills: 5 | Status: SHIPPED | OUTPATIENT
Start: 2021-04-30 | End: 2021-07-07 | Stop reason: SDUPTHER

## 2021-04-30 ASSESSMENT — ENCOUNTER SYMPTOMS
COUGH: 0
SORE THROAT: 0
VOMITING: 0
NAUSEA: 0
TROUBLE SWALLOWING: 0
COLOR CHANGE: 0
WHEEZING: 0
CONSTIPATION: 0
SHORTNESS OF BREATH: 0
BLOOD IN STOOL: 0
ABDOMINAL DISTENTION: 0
VOICE CHANGE: 0
CHEST TIGHTNESS: 0
ABDOMINAL PAIN: 0
BACK PAIN: 0

## 2021-04-30 NOTE — PROGRESS NOTES
for color change and rash. Allergic/Immunologic: Negative for environmental allergies, food allergies and immunocompromised state. Neurological: Negative for dizziness, seizures, syncope, speech difficulty, weakness, numbness and headaches. Hematological: Negative for adenopathy. Does not bruise/bleed easily. Psychiatric/Behavioral: Negative for agitation, behavioral problems, confusion, decreased concentration, dysphoric mood and suicidal ideas. The patient is not nervous/anxious and is not hyperactive. Physical Exam  Vitals signs reviewed. Exam conducted with a chaperone present. Constitutional:       Appearance: She is well-developed. HENT:      Head: Normocephalic and atraumatic. Eyes:      Pupils: Pupils are equal, round, and reactive to light. Neck:      Musculoskeletal: Normal range of motion. Thyroid: No thyromegaly. Trachea: No tracheal deviation. Cardiovascular:      Rate and Rhythm: Normal rate and regular rhythm. Heart sounds: Normal heart sounds. Pulmonary:      Effort: Pulmonary effort is normal.      Breath sounds: Normal breath sounds. Chest:      Chest wall: No tenderness. Abdominal:      General: Bowel sounds are normal. There is no distension. Palpations: Abdomen is soft. There is no mass. Tenderness: There is no abdominal tenderness. There is no guarding or rebound. Hernia: There is no hernia in the left inguinal area. Genitourinary:     Labia:         Right: No rash, tenderness, lesion or injury. Left: No rash, tenderness, lesion or injury. Vagina: Normal. No foreign body. No vaginal discharge, erythema, tenderness or bleeding. Cervix: No cervical motion tenderness or discharge. Uterus: Not deviated, not enlarged, not fixed and not tender. Adnexa:         Right: No mass, tenderness or fullness. Left: No mass, tenderness or fullness.         Rectum: Normal. No mass, tenderness, anal fissure,

## 2021-05-06 ENCOUNTER — TELEPHONE (OUTPATIENT)
Dept: INFECTIOUS DISEASES | Age: 46
End: 2021-05-06

## 2021-05-12 ENCOUNTER — TELEPHONE (OUTPATIENT)
Dept: INFECTIOUS DISEASES | Age: 46
End: 2021-05-12

## 2021-05-13 ENCOUNTER — HOSPITAL ENCOUNTER (OUTPATIENT)
Dept: ULTRASOUND IMAGING | Age: 46
Discharge: HOME | End: 2021-05-15
Payer: COMMERCIAL

## 2021-05-13 ENCOUNTER — HOSPITAL ENCOUNTER (OUTPATIENT)
Dept: WOMENS IMAGING | Age: 46
Discharge: HOME | End: 2021-05-15
Payer: COMMERCIAL

## 2021-05-13 DIAGNOSIS — N64.4 BREAST PAIN: ICD-10-CM

## 2021-05-13 DIAGNOSIS — B20 HIV INFECTION, UNSPECIFIED SYMPTOM STATUS (HCC): ICD-10-CM

## 2021-05-13 DIAGNOSIS — R10.2 PELVIC PAIN: ICD-10-CM

## 2021-05-13 LAB
ALBUMIN SERPL-MCNC: 4.4 G/DL (ref 3.5–4.6)
ALP BLD-CCNC: 42 U/L (ref 40–130)
ALT SERPL-CCNC: 13 U/L (ref 0–33)
ANION GAP SERPL CALCULATED.3IONS-SCNC: 12 MEQ/L (ref 9–15)
AST SERPL-CCNC: 24 U/L (ref 0–35)
BILIRUB SERPL-MCNC: 0.4 MG/DL (ref 0.2–0.7)
BUN BLDV-MCNC: 9 MG/DL (ref 6–20)
CALCIUM SERPL-MCNC: 10 MG/DL (ref 8.5–9.9)
CHLORIDE BLD-SCNC: 106 MEQ/L (ref 95–107)
CHOLESTEROL, TOTAL: 216 MG/DL (ref 0–199)
CO2: 22 MEQ/L (ref 20–31)
CREAT SERPL-MCNC: 0.65 MG/DL (ref 0.5–0.9)
GFR AFRICAN AMERICAN: >60
GFR NON-AFRICAN AMERICAN: >60
GLOBULIN: 3 G/DL (ref 2.3–3.5)
GLUCOSE BLD-MCNC: 72 MG/DL (ref 70–99)
HCT VFR BLD CALC: 44.1 % (ref 37–47)
HDLC SERPL-MCNC: 45 MG/DL (ref 40–59)
HEMOGLOBIN: 14.4 G/DL (ref 12–16)
HEPATITIS C ANTIBODY INTERPRETATION: NORMAL
LDL CHOLESTEROL CALCULATED: 148 MG/DL (ref 0–129)
MCH RBC QN AUTO: 30 PG (ref 27–31.3)
MCHC RBC AUTO-ENTMCNC: 32.7 % (ref 33–37)
MCV RBC AUTO: 91.8 FL (ref 82–100)
PDW BLD-RTO: 13.2 % (ref 11.5–14.5)
PLATELET # BLD: 222 K/UL (ref 130–400)
PLATELET SLIDE REVIEW: ADEQUATE
POTASSIUM SERPL-SCNC: 4.2 MEQ/L (ref 3.4–4.9)
RBC # BLD: 4.8 M/UL (ref 4.2–5.4)
RPR TITER: NORMAL
RPR: REACTIVE
SODIUM BLD-SCNC: 140 MEQ/L (ref 135–144)
TOTAL PROTEIN: 7.4 G/DL (ref 6.3–8)
TRIGL SERPL-MCNC: 115 MG/DL (ref 0–150)
VITAMIN D 25-HYDROXY: 84.4 NG/ML (ref 30–100)
WBC # BLD: 5.3 K/UL (ref 4.8–10.8)

## 2021-05-13 PROCEDURE — 76856 US EXAM PELVIC COMPLETE: CPT

## 2021-05-13 PROCEDURE — 76830 TRANSVAGINAL US NON-OB: CPT

## 2021-05-13 PROCEDURE — G0279 TOMOSYNTHESIS, MAMMO: HCPCS

## 2021-05-14 LAB
ABSOLUTE CD 4 HELPER: 374 CELLS/UL (ref 430–1800)
CD4 % HELPER T CELL: 31 % (ref 32–64)
LYMPHOCYTE SUBSET PANEL 2 INFO: ABNORMAL

## 2021-05-15 LAB
QUANTI TB GOLD PLUS: NEGATIVE
QUANTI TB1 MINUS NIL: 0 IU/ML (ref 0–0.34)
QUANTI TB2 MINUS NIL: 0 IU/ML (ref 0–0.34)
QUANTIFERON MITOGEN: 6.97 IU/ML
QUANTIFERON NIL: 0.02 IU/ML

## 2021-05-16 LAB
HIV-1 QNT LOG, IU/ML: NOT DETECTED LOG CPY/ML
HIV-1 QNT, IU/ML: NOT DETECTED CPY/ML
INTERPRETATION: NOT DETECTED
TREPONEMA PALLIDUM ANTIBODIES: NON REACTIVE

## 2021-05-17 LAB
C. TRACHOMATIS DNA ,URINE: NEGATIVE
N. GONORRHOEAE DNA, URINE: NEGATIVE

## 2021-05-20 ENCOUNTER — VIRTUAL VISIT (OUTPATIENT)
Dept: INFECTIOUS DISEASES | Age: 46
End: 2021-05-20
Payer: COMMERCIAL

## 2021-05-20 DIAGNOSIS — Z71.89 COUNSELING ON HEALTH PROMOTION AND DISEASE PREVENTION: ICD-10-CM

## 2021-05-20 DIAGNOSIS — B20 HIV INFECTION, UNSPECIFIED SYMPTOM STATUS (HCC): Primary | ICD-10-CM

## 2021-05-20 PROCEDURE — G8428 CUR MEDS NOT DOCUMENT: HCPCS | Performed by: INTERNAL MEDICINE

## 2021-05-20 PROCEDURE — 99214 OFFICE O/P EST MOD 30 MIN: CPT | Performed by: INTERNAL MEDICINE

## 2021-05-20 PROCEDURE — 1036F TOBACCO NON-USER: CPT | Performed by: INTERNAL MEDICINE

## 2021-05-20 PROCEDURE — G8420 CALC BMI NORM PARAMETERS: HCPCS | Performed by: INTERNAL MEDICINE

## 2021-05-20 NOTE — PROGRESS NOTES
Nish Lakhani  1975  female  Medical Record Number: [id number]    2021    TELEHEALTH EVALUATION -- Audio/Visual (During LKSDK-54 public health emergency)      Nish Lakhani (:  1975) has requested an audio/video evaluation for the following concern(s):    HIV    Due to the COVID-19 outbreak, patient's office visit was converted to a virtual visit. Patient was contacted and agreed to proceed with a virtual visit with me via Doxy. me with my location at the office. Patient reports that they are located at home. The risks and benefits of converting to a virtual visit were discussed in light of the current infectious disease epidemic. Services were provided through an audio/video synchronous discussion virtually to substitute for in person clinic visit. THIS virtual visit was conducted via interactive/real-time audio/video. Due to this being a TeleHealth encounter, evaluation of the following organ systems is limited: Vitals/Constitutional/EENT/Resp/CV/GI//MS/Neuro/Skin/Heme-Lymph-Imm.}    Pursuant to the emergency declaration under the [facility], [facility] waiver authority and the [employer] Act, this Virtual Visit was conducted, with patient's consent, to reduce the patient's risk of exposure to COVID-19 and provide care for the patient. Infectious Disease Progress Note    Patient is a followup for HIV  T pallidum antibodies were negative. Her RPR is reactive but this may be serofast in HIV. NO SYPHILIS. Labs reviewed with patient  States that she is trying to get pregnant and has no period in almost 3 months. Had recent [address] with her OB but no recent pregnancy test. She is on progesterone and clomid. She has some confusion with this. Brief discussion and referred back to [facility] for further details. She is HIV controlled. Viral load for her and her partner negative.    She is to tell me as soon as she is pregnant. Discussed obtaining the COVID 19 vaccine prior to pregnancy. All 14 review of systems discussed and negative other than as stated as above. Since we cannot conduct an in-person exam, the following were addressed with the patient. I have asked the patient to assist in their exam:  Patient denies any general new issues. Patient does not observe any new skin rashes or ulcers. Patient does not perceive any new visual deficits  Patient does not feel like they are \"clammy\" or sweating  Patient denies any dry mouth or sore mouth and is able to flex and extend her neck with ease. Patient can inhale and exhale without any difficulty and feels like their chest is expanding symmetrically. They do not feel short of breath or any distress when asked to move around. No pain with expansion of the chest  Patient is able to feel their own pulse and agrees it is regular. Patient states their abdomen is not protruberant beyond their normal size. States that there is no pain when touching the area beneath the sternum when directed, or the left or the right side of the abdomen. They feel no pain when asked to press on the suprapubic area or the right or left flank. Patient denies any pain when asked to squeeze both upper legs and lower legs anteriorly or posteriorly. They do not see any new swelling of their joints. No observed neurological changes or slurred speech when speaking to the patient      Imaging and labs were reviewed per medical records and any ID pertinent labs were addressed with the patient. Lab Results   Component Value Date    WBC 5.3 05/13/2021    HGB 14.4 05/13/2021    HCT 44.1 05/13/2021    MCV 91.8 05/13/2021     05/13/2021       Assessment:  HIV - controlled. Hx of brain lesions and intermittent headaches - follows with neuro and stable. Varicose veins - following with [facility]  Counseling and prevention - COVID and vaccine.    HIV/AIDS - Genvoya, 100% compliance. Plan:  Compliance stressed - especially if she is planning pregnancy. Discussed at length COVID vaccine  Continue Genvoya  Follow up with vascular regarding her varicosities  RPR is reactive but her T pallidum confirmation is NEGATIVE so this is a serofast in HIV. Flu vaccine this fall. Needs to inform ID if she is pregnant. Will monitor her more closely if this is the case and peds will need to be involved asap. Must get the COVID vaccine asap    Patient was counseled on the importance of proper handwashing, especially after handling wounds, surfaces, urine, and stool. If any diarrhea develops, patient is to refrain from the use of anti-diarrhea medications and is to call me immediately. Patient should refrain from touching their face. Questions regarding the current COVID outbreak addressed as needed. Time spent today in combination of reviewing patient's chart, medications, labs, pictures when pertinent, provider communication as necessary, counseling patient, care/coordination not otherwise reported here, and patient face to face virtual visit >25 min.   >50% of that time spent counseling and coordination of patient care  Patient was also appropriately counseled on preventive measures such as vaccinations, the importance of annual exam with their PCP, and the importance of health maintenance by dietary and exercise regimens. All questions were answered from an ID perspective and to the best of my ability.           Melissa Glover D.O.

## 2021-05-21 DIAGNOSIS — B20 HIV INFECTION, UNSPECIFIED SYMPTOM STATUS (HCC): ICD-10-CM

## 2021-05-21 LAB — HCG QUALITATIVE: NEGATIVE

## 2021-06-08 ENCOUNTER — TELEPHONE (OUTPATIENT)
Dept: INFECTIOUS DISEASES | Age: 46
End: 2021-06-08

## 2021-06-09 ENCOUNTER — TELEPHONE (OUTPATIENT)
Dept: INFECTIOUS DISEASES | Age: 46
End: 2021-06-09

## 2021-06-10 ENCOUNTER — TELEPHONE (OUTPATIENT)
Dept: INFECTIOUS DISEASES | Age: 46
End: 2021-06-10

## 2021-06-10 ENCOUNTER — OFFICE VISIT (OUTPATIENT)
Dept: FAMILY MEDICINE CLINIC | Age: 46
End: 2021-06-10
Payer: COMMERCIAL

## 2021-06-10 VITALS
WEIGHT: 133 LBS | SYSTOLIC BLOOD PRESSURE: 110 MMHG | HEIGHT: 64 IN | HEART RATE: 81 BPM | RESPIRATION RATE: 13 BRPM | OXYGEN SATURATION: 97 % | BODY MASS INDEX: 22.71 KG/M2 | TEMPERATURE: 96.3 F | DIASTOLIC BLOOD PRESSURE: 78 MMHG

## 2021-06-10 DIAGNOSIS — B20 HIV INFECTION, UNSPECIFIED SYMPTOM STATUS (HCC): ICD-10-CM

## 2021-06-10 DIAGNOSIS — R10.2 PAIN IN PELVIS: ICD-10-CM

## 2021-06-10 DIAGNOSIS — F41.9 ANXIETY: Chronic | ICD-10-CM

## 2021-06-10 DIAGNOSIS — R00.2 PALPITATION: ICD-10-CM

## 2021-06-10 DIAGNOSIS — F32.5 MAJOR DEPRESSIVE DISORDER WITH SINGLE EPISODE, IN FULL REMISSION (HCC): Primary | ICD-10-CM

## 2021-06-10 PROCEDURE — G8420 CALC BMI NORM PARAMETERS: HCPCS | Performed by: FAMILY MEDICINE

## 2021-06-10 PROCEDURE — G8427 DOCREV CUR MEDS BY ELIG CLIN: HCPCS | Performed by: FAMILY MEDICINE

## 2021-06-10 PROCEDURE — 99214 OFFICE O/P EST MOD 30 MIN: CPT | Performed by: FAMILY MEDICINE

## 2021-06-10 PROCEDURE — 1036F TOBACCO NON-USER: CPT | Performed by: FAMILY MEDICINE

## 2021-06-10 PROCEDURE — 93000 ELECTROCARDIOGRAM COMPLETE: CPT | Performed by: FAMILY MEDICINE

## 2021-06-10 SDOH — ECONOMIC STABILITY: FOOD INSECURITY: WITHIN THE PAST 12 MONTHS, YOU WORRIED THAT YOUR FOOD WOULD RUN OUT BEFORE YOU GOT MONEY TO BUY MORE.: NEVER TRUE

## 2021-06-10 SDOH — ECONOMIC STABILITY: TRANSPORTATION INSECURITY
IN THE PAST 12 MONTHS, HAS THE LACK OF TRANSPORTATION KEPT YOU FROM MEDICAL APPOINTMENTS OR FROM GETTING MEDICATIONS?: NO

## 2021-06-10 SDOH — ECONOMIC STABILITY: TRANSPORTATION INSECURITY
IN THE PAST 12 MONTHS, HAS LACK OF TRANSPORTATION KEPT YOU FROM MEETINGS, WORK, OR FROM GETTING THINGS NEEDED FOR DAILY LIVING?: NO

## 2021-06-10 SDOH — ECONOMIC STABILITY: FOOD INSECURITY: WITHIN THE PAST 12 MONTHS, THE FOOD YOU BOUGHT JUST DIDN'T LAST AND YOU DIDN'T HAVE MONEY TO GET MORE.: NEVER TRUE

## 2021-06-10 ASSESSMENT — SOCIAL DETERMINANTS OF HEALTH (SDOH): HOW HARD IS IT FOR YOU TO PAY FOR THE VERY BASICS LIKE FOOD, HOUSING, MEDICAL CARE, AND HEATING?: NOT VERY HARD

## 2021-06-10 NOTE — PROGRESS NOTES
occasional    Drug use: No    Sexual activity: Not on file   Other Topics Concern    Not on file   Social History Narrative    Not on file     Social Determinants of Health     Financial Resource Strain: Low Risk     Difficulty of Paying Living Expenses: Not very hard   Food Insecurity: No Food Insecurity    Worried About Running Out of Food in the Last Year: Never true    Manuel of Food in the Last Year: Never true   Transportation Needs: No Transportation Needs    Lack of Transportation (Medical): No    Lack of Transportation (Non-Medical): No   Physical Activity:     Days of Exercise per Week:     Minutes of Exercise per Session:    Stress:     Feeling of Stress :    Social Connections:     Frequency of Communication with Friends and Family:     Frequency of Social Gatherings with Friends and Family:     Attends [employer] Services:     Active Member of Clubs or Organizations:     Attends Club or Organization Meetings:     Marital Status:    Intimate Partner Violence:     Fear of Current or Ex-Partner:     Emotionally Abused:     Physically Abused:     Sexually Abused:      Family History   Problem Relation Age of Onset    High Blood Pressure Mother     Stroke Mother     Diabetes Mother     No Known Problems Father      Allergies   Allergen Reactions    Sulfa Antibiotics Swelling     Current Outpatient Medications   Medication Sig Dispense Refill    medroxyPROGESTERone (PROVERA) 10 MG tablet Take 1 tablet by mouth daily For 10 days 10 tablet 5    clomiPHENE (CLOMID) 50 MG tablet Take 4 tablets daily on days 5 through 9 30 tablet 3    clomiPHENE (CLOMID) 50 MG tablet Take 4 tabs daily on days 5 through 9 x 5 days 20 tablet 0    terbinafine (LAMISIL) 1 % cream Apply topically 2 times daily.  42 g 5    Prenatal Vit-Fe Fumarate-FA (PRENATAL LOW IRON) 27-0.8 MG TABS Take 1 tablet by mouth daily 30 tablet 12    citalopram (CELEXA) 20 MG tablet Take 1 tablet by mouth daily 30 tablet 6    topiramate (TOPAMAX) 25 MG tablet One qhs for 1 week, then 2  qhs for 1 week, then 3  qhs 90 tablet 3    Probiotic Product (ACIDOPHILUS/BIFIDUS PO) Take by mouth      GENVOYA [id number] MG TABLET TAKE 1 TABLET DAILY  2    SUMAtriptan (IMITREX) 100 MG tablet Take 1 tablet by mouth once as needed for Migraine 9 tablet 3    Cholecalciferol (VITAMIN D3) 125 MCG (5000 UT) CAPS Take 1 capsule by mouth daily (Patient not taking: Reported on 12/3/2020) 30 capsule 11     No current facility-administered medications for this visit. PMH, Surgical Hx, Family Hx, and Social Hxreviewed and updated. Health Maintenance reviewed. Objective    Vitals:    06/10/21 0820   BP: 110/78   Pulse: 81   Resp: 13   Temp: 96.3 °F (35.7 °C)   TempSrc: Temporal   SpO2: 97%   Weight: 133 lb (60.3 kg)   Height: 5' 4\" (1.626 m)        Physical Exam  Constitutional:       General: She is not in acute distress. Appearance: She is well-developed. HENT:      Head: Normocephalic and atraumatic. Eyes:      General: No scleral icterus. Conjunctiva/sclera: Conjunctivae normal.   Cardiovascular:      Rate and Rhythm: Normal rate and regular rhythm. Heart sounds: Normal heart sounds. Pulmonary:      Effort: Pulmonary effort is normal. No respiratory distress. Breath sounds: Normal breath sounds. No wheezing or rales. Abdominal:      General: Bowel sounds are normal. There is no distension. Palpations: Abdomen is soft. There is no mass. Tenderness: There is no abdominal tenderness. Musculoskeletal:      Right lower leg: No edema. Left lower leg: No edema. Skin:     General: Skin is warm and dry. Findings: No rash. Neurological:      Mental Status: She is alert and oriented to person, place, and time. Psychiatric:         Mood and Affect: Mood normal.         Behavior: Behavior normal.         Thought Content:  Thought content normal.         Judgment: Judgment normal.         EKG NSR without acute changes. Lab Results   Component Value Date    LABA1C 5.1 12/07/2020    LABA1C 5.1 07/24/2019    LABA1C 5.1 07/10/2019     Lab Results   Component Value Date    CREATININE 0.65 05/13/2021     Lab Results   Component Value Date    ALT 13 05/13/2021    AST 24 05/13/2021     Lab Results   Component Value Date    CHOL 216 (H) 05/13/2021    TRIG 115 05/13/2021    HDL 45 05/13/2021    LDLCALC 148 (H) 05/13/2021        Lab Results   Component Value Date    WBC 5.3 05/13/2021    HGB 14.4 05/13/2021    HCT 44.1 05/13/2021     05/13/2021    CHOL 216 (H) 05/13/2021    TRIG 115 05/13/2021    HDL 45 05/13/2021    ALT 13 05/13/2021    AST 24 05/13/2021     05/13/2021    K 4.2 05/13/2021     05/13/2021    CREATININE 0.65 05/13/2021    BUN 9 05/13/2021    CO2 22 05/13/2021    TSH 0.820 02/10/2019    LABA1C 5.1 12/07/2020         US PELVIS COMPLETE  Narrative: EXAMINATION: US PELVIS COMPLETE, US NON OB TRANSVAGINAL    CLINICAL HISTORY: 41year-old with left lower quadrant pain. COMPARISONS: Pelvic ultrasound 3/10/2020 and February 10, 2019    FINDINGS: Transabdominal and transvaginal ultrasounds of the pelvis and Doppler ovarian assessments were performed. On the transabdominal study the uterus is normal at the upper limits of normal in size measuring 10.7 x 7.9 x 4.6 cm. . Endometrium,   cervix and ovaries are better visualized on the transvaginal exam.    On transvaginal views there is some shadowing causing limiting assessment assessment of the endometrium. However on the cine clips there appears to be thickening of the endometrial echo complex measuring up to 2.2 cm. The posterior aspect of the   endometrium is relatively echogenic whereas the anterior endometrium is more hypoechoic. No significant cystic changes. Differential considerations for endometrial thickening do include hyperplasia, polyps and endometrial malignancy. Further endometrial   assessment is recommended.  There is a small  cyst in the anterior lower uterine segment/upper cervix. Heterogeneity of the myometrium particularly the anterior myometrium can be seen with fibroid change or adenomyosis. If clinically indicated, MRI is   recommended for further evaluation. There are a few nabothian cysts. Right ovary is upper limits of normal measuring 4.5 x 3.5 x 2.1 cm a volume of 17 mL. It contains a 2.3 cm crenulated cyst probably involuting corpus luteum and a few small follicles. Left ovary measures 3.5 x 2.7 x 1.9 cm with a volume of 9.4 mL and   contains small follicles. Color flow and spectral waveforms are documented within both ovaries. There are no adnexal masses or significant free fluid in the pelvis  Impression:  IMPRESSION: THICKENED SLIGHTLY HETEROGENEOUS ENDOMETRIAL ECHO COMPLEX WITH DIFFERENTIAL CONSIDERATIONS AS DETAILED ABOVE. FURTHER ENDOMETRIAL ASSESSMENT IS RECOMMENDED. HETEROGENEOUS MYOMETRIAL ECHOTEXTURE MORE PRONOUNCED LUNG THE ANTERIOR ASPECT   RAISING QUESTION OF FIBROID CHANGE OR ADENOMYOSIS. CONSIDER CORRELATION WITH MRI. OTHERWISE ESSENTIALLY UNREMARKABLE PELVIC ULTRASOUND  US NON OB TRANSVAGINAL  Narrative: EXAMINATION: US PELVIS COMPLETE, US NON OB TRANSVAGINAL    CLINICAL HISTORY: 69-year-old with left lower quadrant pain. COMPARISONS: Pelvic ultrasound 3/10/2020 and February 10, 2019    FINDINGS: Transabdominal and transvaginal ultrasounds of the pelvis and Doppler ovarian assessments were performed. On the transabdominal study the uterus is normal at the upper limits of normal in size measuring 10.7 x 7.9 x 4.6 cm. . Endometrium,   cervix and ovaries are better visualized on the transvaginal exam.    On transvaginal views there is some shadowing causing limiting assessment assessment of the endometrium. However on the cine clips there appears to be thickening of the endometrial echo complex measuring up to 2.2 cm.  The posterior aspect of the   endometrium is relatively echogenic whereas the anterior endometrium is more hypoechoic. No significant cystic changes. Differential considerations for endometrial thickening do include hyperplasia, polyps and endometrial malignancy. Further endometrial   assessment is recommended. There is a small  cyst in the anterior lower uterine segment/upper cervix. Heterogeneity of the myometrium particularly the anterior myometrium can be seen with fibroid change or adenomyosis. If clinically indicated, MRI is   recommended for further evaluation. There are a few nabothian cysts. Right ovary is upper limits of normal measuring 4.5 x 3.5 x 2.1 cm a volume of 17 mL. It contains a 2.3 cm crenulated cyst probably involuting corpus luteum and a few small follicles. Left ovary measures 3.5 x 2.7 x 1.9 cm with a volume of 9.4 mL and   contains small follicles. Color flow and spectral waveforms are documented within both ovaries. There are no adnexal masses or significant free fluid in the pelvis  Impression:  IMPRESSION: THICKENED SLIGHTLY HETEROGENEOUS ENDOMETRIAL ECHO COMPLEX WITH DIFFERENTIAL CONSIDERATIONS AS DETAILED ABOVE. FURTHER ENDOMETRIAL ASSESSMENT IS RECOMMENDED. HETEROGENEOUS MYOMETRIAL ECHOTEXTURE MORE PRONOUNCED LUNG THE ANTERIOR ASPECT   RAISING QUESTION OF FIBROID CHANGE OR ADENOMYOSIS. CONSIDER CORRELATION WITH MRI. OTHERWISE ESSENTIALLY UNREMARKABLE PELVIC ULTRASOUND  [facility]ON DIGITAL DIAGNOSTIC BILATERAL  Narrative: EXAMINATION: [facility]ON DIGITAL DIAGNOSTIC BILATERAL    CLINICAL HISTORY: Occasional bilateral breast pain. No current pain. COMPARISON: Prior mammograms from 2019. RESULT:    3-D tomosynthesis imaging of the bilateral breasts was performed. The breast parenchyma is heterogeneously dense which may obscure small masses. There are no suspicious masses or asymmetries, areas of architectural distortion or suspicious areas of microcalcifications. CAD analysis was performed and used in the interpretation.   Impression: BI-RADS 1: NEGATIVE MAMMOGRAM.    ROUTINE FOLLOW-UP MAMMOGRAPHY IS SUGGESTED IN ONE YEAR. DENSITY: Heterogeneous     Board Certified Radiologists. Accredited by the ACR and FDA. MAMMOGRAPHY IS VERY IMPORTANT TO YOUR HEALTH. THE AMERICAN CANCER SOCIETY GUIDELINES RECOMMEND THAT WOMEN 36YEARS OF AGE AND OLDER SHOULD HAVE A MAMMOGRAM EVERY YEAR. A REMINDER LETTER WILL BE SENT AT THE APPROPRIATE TIME.  THIS FACILITY UTILIZES A REMINDER SYSTEM TO ENSURE ALL PATIENTS RECEIVE REMINDER NOTIFICATIONS AT THE APPROPRIATE TIME BASED ON THE RECOMMENDATIONS OF THIS EXAM. THIS INCLUDES REMINDERS FOR ROUTINE   SCREENING MAMMOGRAMS, DIAGNOSTIC MAMMOGRAMS IN WHICH THE PATIENT IS ASKED TO RETURN FOR ADDITIONAL VIEWS, OR OTHER BREAST IMAGING INTERVENTIONS WHEN APPROPRIATE. THE PATIENT WILL BE PLACED IN THE APPROPRIATE REMINDER SYSTEM INCLUDING A REMINDER AT THE   APPROPRIATE TIME FOR ANY PENDING ADDITIONAL VIEWS. Assessment & Plan   Visit Diagnoses and Associated Orders     Major depressive disorder with single episode, in full remission ([address]    -  Primary    Stable and well-controlled on current medications. HIV infection, unspecified symptom status ([facility] [address]        Stable and well-controlled on current medications. Followed by Dr. Tono Hernandez. LDL increasing. Will monitor. Palpitation        EKG unremarkable. Labs normal.  Patient will call if recurrent at which time we will refer to cardiology. EKG 12 Lead [[id number] Custom]   - Future Order    EKG 12 Lead [[id number] Custom]           Pain in pelvis        Followed by Dr. Kristene Bence. She has follow-up with him at the end of June. On Clomid which may be contributing to changes seen on ultrasound. Anxiety        Suspect may be underlying cause with hormone therapy of recent palpitations.                  Reviewed with the patient: all disease processes, current clinical status, medications, activities and diet.      Side effects, adverse effects of the medication prescribed today, as well as treatment plan/ rationale and result expectations have been discussed with the patient who expresses understanding and desires to proceed.     Close follow up to evaluate treatment results and for coordination of care. I have reviewed the patient's medical history in detail and updated the computerized patient record. More than 50% of the appointment was spent in face-to-face counseling, education and care coordination. Please note this report has been partially produced using speech recognition software and may contain mistakes related to that system including errors in grammar, punctuation and spelling as well as words and phrases that may seem inappropriate. If there are questions or concerns, please feel free to contact me to clarify. Orders Placed This Encounter   Procedures    EKG 12 Lead     Standing Status:   Future     Number of Occurrences:   1     Standing Expiration Date:   7/10/2021     Order Specific Question:   Reason for Exam?     Answer:   Irregular heart rate     No orders of the defined types were placed in this encounter. Medications Discontinued During This Encounter   Medication Reason    dapsone 100 MG tablet LIST CLEANUP    HYDROcodone-acetaminophen (NORCO) 5-325 MG per tablet LIST CLEANUP    medroxyPROGESTERone (PROVERA) 10 MG tablet LIST CLEANUP    MUCINEX 600 MG extended release tablet LIST CLEANUP    topiramate (TOPAMAX) 25 MG tablet LIST CLEANUP    SUMAtriptan (IMITREX) 50 MG tablet LIST CLEANUP    SUMAtriptan (IMITREX) 50 MG tablet LIST CLEANUP     Return in about 6 months (around 12/10/2021) for ID, mood - OV. Controlled Substance Monitoring:    Acute and Chronic Pain Monitoring:   RX Monitoring 1/2/2018   Attestation The Prescription Monitoring Report for this patient was reviewed today.            Lizette Ellis MD

## 2021-06-28 ENCOUNTER — TELEPHONE (OUTPATIENT)
Dept: INFECTIOUS DISEASES | Age: 46
End: 2021-06-28

## 2021-06-28 NOTE — TELEPHONE ENCOUNTER
Per request, CM scheduled transport for appointment with OB-Gyn 7/1/21 @ 9:15AM.  CM assessed need, made arrangements as last resort.

## 2021-07-01 ENCOUNTER — OFFICE VISIT (OUTPATIENT)
Dept: OBGYN CLINIC | Age: 46
End: 2021-07-01
Payer: COMMERCIAL

## 2021-07-01 VITALS
DIASTOLIC BLOOD PRESSURE: 66 MMHG | HEART RATE: 66 BPM | WEIGHT: 133 LBS | BODY MASS INDEX: 22.83 KG/M2 | SYSTOLIC BLOOD PRESSURE: 94 MMHG

## 2021-07-01 DIAGNOSIS — N91.2 AMENORRHEA: Primary | ICD-10-CM

## 2021-07-01 DIAGNOSIS — N91.2 AMENORRHEA: ICD-10-CM

## 2021-07-01 DIAGNOSIS — N97.0 OLIGO-OVULATION: ICD-10-CM

## 2021-07-01 DIAGNOSIS — N97.0 SECONDARY ANOVULATORY INFERTILITY: ICD-10-CM

## 2021-07-01 LAB
GONADOTROPIN, CHORIONIC (HCG) QUANT: <0.1 MIU/ML
HCG, URINE, POC: NEGATIVE
Lab: NORMAL
NEGATIVE QC PASS/FAIL: NORMAL
POSITIVE QC PASS/FAIL: NORMAL

## 2021-07-01 PROCEDURE — 1036F TOBACCO NON-USER: CPT | Performed by: OBSTETRICS & GYNECOLOGY

## 2021-07-01 PROCEDURE — G8420 CALC BMI NORM PARAMETERS: HCPCS | Performed by: OBSTETRICS & GYNECOLOGY

## 2021-07-01 PROCEDURE — G8427 DOCREV CUR MEDS BY ELIG CLIN: HCPCS | Performed by: OBSTETRICS & GYNECOLOGY

## 2021-07-01 PROCEDURE — 81025 URINE PREGNANCY TEST: CPT | Performed by: OBSTETRICS & GYNECOLOGY

## 2021-07-01 PROCEDURE — 99213 OFFICE O/P EST LOW 20 MIN: CPT | Performed by: OBSTETRICS & GYNECOLOGY

## 2021-07-01 ASSESSMENT — ENCOUNTER SYMPTOMS
COUGH: 0
WHEEZING: 0
ABDOMINAL PAIN: 0
NAUSEA: 0
TROUBLE SWALLOWING: 0
CHEST TIGHTNESS: 0
VOICE CHANGE: 0
VOMITING: 0
BACK PAIN: 0
COLOR CHANGE: 0
CONSTIPATION: 0
SHORTNESS OF BREATH: 0
ABDOMINAL DISTENTION: 0
SORE THROAT: 0
BLOOD IN STOOL: 0

## 2021-07-01 NOTE — PROGRESS NOTES
HPI:  Abel Rodriguez (: 1975) is a 41 y.o. female, Established patient, here for evaluation of the following chief complaint(s):  Follow-up (infertility lmp 21 pt has not had a cycle since then. pt took clomid as directed. pt has had pregnancy symptoms but she feels she is not pregnant)  Patient is room day 40. She took 200 of Clomid day 5 through 9 last cycle she feels pregnant but she took it at pregnancy test at home was negative we did when here today it was negative but we will go ahead and get some blood. She is also concerned about the status of her 's semen. He used to be a  and took steroids for a long time. SUBJECTIVE/OBJECTIVE:    Past Surgical History:   Procedure Laterality Date    CERVICAL CERCLAGE  10 yrs ago    3 pregnancies & 3 cerclages    HYSTEROSCOPY DIAGNOSTIC N/A 2016    HYSTEROSCOPY OPERATIVE  LAPAROSCOPY, Scottish SPEAKING  performed by Orion Donis DO at [facility] Bilateral         Review of Systems   Constitutional: Negative for activity change, appetite change, fatigue and unexpected weight change. HENT: Negative for dental problem, ear pain, hearing loss, nosebleeds, sore throat, trouble swallowing and voice change. Eyes: Negative for visual disturbance. Respiratory: Negative for cough, chest tightness, shortness of breath and wheezing. Cardiovascular: Negative for chest pain and palpitations. Gastrointestinal: Negative for abdominal distention, abdominal pain, blood in stool, constipation, nausea and vomiting. Endocrine: Negative for cold intolerance, heat intolerance, polydipsia, polyphagia and polyuria. Genitourinary: Negative for difficulty urinating, dyspareunia, dysuria, flank pain, frequency, genital sores, hematuria, menstrual problem, pelvic pain, urgency, vaginal bleeding, vaginal discharge and vaginal pain.    Musculoskeletal: Negative for arthralgias, back pain, joint swelling and myalgias. Skin: Negative for color change and rash. Allergic/Immunologic: Negative for environmental allergies, food allergies and immunocompromised state. Neurological: Negative for dizziness, seizures, syncope, speech difficulty, weakness, numbness and headaches. Hematological: Negative for adenopathy. Does not bruise/bleed easily. Psychiatric/Behavioral: Negative for agitation, behavioral problems, confusion, decreased concentration, dysphoric mood and suicidal ideas. The patient is not nervous/anxious and is not hyperactive. Physical Exam  Vitals reviewed. Constitutional:       Appearance: Normal appearance. Neurological:      Mental Status: She is alert. Vitals:    07/01/21 0901   BP: 94/66   Pulse: 66   Weight: 133 lb (60.3 kg)         ASSESSMENT/PLAN:     Diagnosis Orders   1. Amenorrhea  POC Pregnancy Urine Qual   2. Oligo-ovulation     3. Secondary anovulatory infertility         PLAN: Secondary infertility. Oligo ovulation  We will refer her  to the enterologist for a semen analysis. We will do serum hCG if negative Provera then 250 of the Clomid day 5 through 9      No follow-ups on file. On this date 7/1/2021 I have spent 20 minutes reviewing previous notes, test results and face to face with the patient discussing the diagnosis and importance of compliance with the treatment plan as well as documenting on the day of the visit. An electronic signature was used to authenticate this note. Please note this report has been partially produced using speech recognition software and may cause contain errors related to that system including grammar, punctuation and spelling as well as words and phrases that may seem inappropriate. If there are questions or concerns please feel free to contact me to clarify.

## 2021-07-06 ENCOUNTER — TELEPHONE (OUTPATIENT)
Dept: INFECTIOUS DISEASES | Age: 46
End: 2021-07-06

## 2021-07-06 NOTE — TELEPHONE ENCOUNTER
Pt called in with questions (CM translated) and concerns regarding OB apt follow up. States her pregnancy test is negative but unsure of how she should follow up. Call placed to OB spoke with [facility]. Reviewed last MD note and lab work. Pt is to start [address] [place] for 10 days then clomid day 5-9.   pts  needs follow up as well as described in note.      Reviewed with Cm and she will call to discuss the above information with pt

## 2021-07-06 NOTE — TELEPHONE ENCOUNTER
Per request, CM provided translation in how pt should take Provera and Clomid per office of Dr. Kriss Figueroa via [address] [place] [address] RN. Pt instructed to take Provera for 10 days, start Clomid on day 5 and take until day 9. CM asked pt to repeat instructions for clarity. CM will follow up with pt as needed.

## 2021-07-07 ENCOUNTER — TELEPHONE (OUTPATIENT)
Dept: OBGYN CLINIC | Age: 46
End: 2021-07-07

## 2021-07-07 DIAGNOSIS — N64.4 BREAST PAIN: ICD-10-CM

## 2021-07-07 DIAGNOSIS — R10.2 PELVIC PAIN: ICD-10-CM

## 2021-07-07 RX ORDER — MEDROXYPROGESTERONE ACETATE 10 MG/1
10 TABLET ORAL DAILY
Qty: 10 TABLET | Refills: 5 | Status: SHIPPED | OUTPATIENT
Start: 2021-07-07 | End: 2021-12-09 | Stop reason: ALTCHOICE

## 2021-07-08 ENCOUNTER — TELEPHONE (OUTPATIENT)
Dept: FAMILY MEDICINE CLINIC | Age: 46
End: 2021-07-08

## 2021-07-08 NOTE — TELEPHONE ENCOUNTER
Patient calling in because she needs an updated animal letter for her service dog . This was completed 07/20 of last year. Can we copy letter update the date and have [address] sign ?     Please advise

## 2021-07-15 ENCOUNTER — TELEPHONE (OUTPATIENT)
Dept: INFECTIOUS DISEASES | Age: 46
End: 2021-07-15

## 2021-08-02 ENCOUNTER — NURSE ONLY (OUTPATIENT)
Dept: INFECTIOUS DISEASES | Age: 46
End: 2021-08-02

## 2021-08-02 NOTE — PROGRESS NOTES
Re-Assessment      Patient ID:   Elayne Cordova  Date:   8/2/2021      Client ID:  [id number]    [address]  [place] [address]  [phone] (home)     Family/House:    [] Partner  [] Children  [] Other -     Mental Health:   Hx of MH    Substance Abuse:   Hx of Marijuana use  Social History     Socioeconomic History    Marital status: Single     Spouse name: Not on file    Number of children: Not on file    Years of education: Not on file    Highest education level: Not on file   Occupational History    Not on file   Tobacco Use    Smoking status: Never Smoker    Smokeless tobacco: Never Used   Vaping Use    Vaping Use: Never used   Substance and Sexual Activity    Alcohol use: No     Comment: occasional    Drug use: No    Sexual activity: Not on file   Other Topics Concern    Not on file   Social History Narrative    Not on file     Social Determinants of Health     Financial Resource Strain: Low Risk     Difficulty of Paying Living Expenses: Not very hard   Food Insecurity: No Food Insecurity    Worried About Running Out of Food in the Last Year: Never true    Manuel of Food in the Last Year: Never true   Transportation Needs: No Transportation Needs    Lack of Transportation (Medical): No    Lack of Transportation (Non-Medical):  No   Physical Activity:     Days of Exercise per Week:     Minutes of Exercise per Session:    Stress:     Feeling of Stress :    Social Connections:     Frequency of Communication with Friends and Family:     Frequency of Social Gatherings with Friends and Family:     Attends Mosque Services:     Active Member of Clubs or Organizations:     Attends Club or Organization Meetings:     Marital Status:    Intimate Partner Violence:     Fear of Current or Ex-Partner:     Emotionally Abused:     Physically Abused:     Sexually Abused:        Housing:   with family    Health/Healthcare:  HIV Stable   Physician Visit:  Dr. Jory Harrell 5/21   Dental Visit:  CM encouraged follow up    CD4:   Lab Results   Component Value Date    LABABSO 374 05/13/2021       Viral Load:  Lab Results   Component Value Date    [id number] Not Detected 05/13/2021       Medical Insurance:  Payor: Flakita Valderrama / Plan: Delores Bobo / Product Type: *No Product type* /    OHDAP/HIPSCA:  NA   Renewal Date:  NA    Income:    Child Support; employment    Legal Issues:    NA    Emergency Contact:   Extended Emergency Contact Information  Primary Emergency Contact: Lucas Eaton  Address: [address], [address] [place] [address] [address] [employer] Phone: [phone]  Mobile Phone: [phone]  Relation: Other  Secondary Emergency Contact: Brandy [address] Phone: [phone]  Relation: Child  Pt presented for scheduled appointment with CM. CM completed Semi-Annual Review, noted change in income. Pt now working part-time as of 7/26/21. CM reviewed Grievance Policy, Sliding Scale Fee and Transportation Policy with pt. CM completed PSA and SA/MH Assessments. Pt has hx of marijuana use and MH. Pt refuses counseling referral.  CM updated ISP, Pt signed in agreement with POC. Pt remains compliant with HIV care, last VL was undetectable. CM encouraged pt to follow up with dentist.  CM explained the importance of dental care. Pt is sexually active, is aware of U=U, undetectable equals un-transmittable. CM provided socialization, pt discussed bouts of anxiety and depression, which has been better since she started working. CM provided active listening and support. CM provided PPE via [employer] and gas and food vouchers via [address] [place] [address] as last resort. CM will follow up with pt as needed.

## 2021-08-02 NOTE — PROGRESS NOTES
Re-Assessment    ERROR  Patient ID:   Lorenzo Rodriguez  Date:   8/2/2021      Client ID:  [id number]    [address]  [facility] [address]  [phone] (home)     Family/House:    [] Partner  [] Children  [] Other -     Mental Health:   Hx of MH    Substance Abuse:   Marijuana Use  Social History     Socioeconomic History    Marital status: Single     Spouse name: Not on file    Number of children: Not on file    Years of education: Not on file    Highest education level: Not on file   Occupational History    Not on file   Tobacco Use    Smoking status: Never Smoker    Smokeless tobacco: Never Used   Vaping Use    Vaping Use: Never used   Substance and Sexual Activity    Alcohol use: No     Comment: occasional    Drug use: No    Sexual activity: Not on file   Other Topics Concern    Not on file   Social History Narrative    Not on file     Social Determinants of Health     Financial Resource Strain: Low Risk     Difficulty of Paying Living Expenses: Not very hard   Food Insecurity: No Food Insecurity    Worried About Running Out of Food in the Last Year: Never true    Manuel of Food in the Last Year: Never true   Transportation Needs: No Transportation Needs    Lack of Transportation (Medical): No    Lack of Transportation (Non-Medical):  No   Physical Activity:     Days of Exercise per Week:     Minutes of Exercise per Session:    Stress:     Feeling of Stress :    Social Connections:     Frequency of Communication with Friends and Family:     Frequency of Social Gatherings with Friends and Family:     Attends Church Services:     Active Member of Clubs or Organizations:     Attends Club or Organization Meetings:     Marital Status:    Intimate Partner Violence:     Fear of Current or Ex-Partner:     Emotionally Abused:     Physically Abused:     Sexually Abused:        Housing:   with family    Health/Healthcare:  HIV Stable   Physician Visit:     Dental Visit: CD4:   Lab Results   Component Value Date    LABABSO 374 05/13/2021       Viral Load:  Lab Results   Component Value Date    [id number] Not Detected 05/13/2021       Medical Insurance:  Payor: Panchito Magdaleno / Plan: Blair Martínez / Product Type: *No Product type* /    OHDAP/HIPSCA:     Renewal Date:      Income:        Legal Issues:    Emergency Contact:   Extended Emergency Contact Information  Primary Emergency Contact: Kd Bianchi  Address: [address] [place] [address] A, [address] [place] [address] [address]  Phone: [phone]  Mobile Phone: [phone]  Relation: Other  Secondary Emergency Contact: [facility] [address] [facility] Phone: [phone]  Relation: Child

## 2021-09-02 ENCOUNTER — OFFICE VISIT (OUTPATIENT)
Dept: OBGYN CLINIC | Age: 46
End: 2021-09-02
Payer: COMMERCIAL

## 2021-09-02 VITALS
WEIGHT: 133 LBS | HEART RATE: 70 BPM | BODY MASS INDEX: 22.83 KG/M2 | DIASTOLIC BLOOD PRESSURE: 60 MMHG | SYSTOLIC BLOOD PRESSURE: 108 MMHG

## 2021-09-02 DIAGNOSIS — R10.2 PELVIC PAIN: Primary | ICD-10-CM

## 2021-09-02 PROCEDURE — 1036F TOBACCO NON-USER: CPT | Performed by: OBSTETRICS & GYNECOLOGY

## 2021-09-02 PROCEDURE — G8427 DOCREV CUR MEDS BY ELIG CLIN: HCPCS | Performed by: OBSTETRICS & GYNECOLOGY

## 2021-09-02 PROCEDURE — 99213 OFFICE O/P EST LOW 20 MIN: CPT | Performed by: OBSTETRICS & GYNECOLOGY

## 2021-09-02 PROCEDURE — G8420 CALC BMI NORM PARAMETERS: HCPCS | Performed by: OBSTETRICS & GYNECOLOGY

## 2021-09-02 RX ORDER — NITROFURANTOIN 25; 75 MG/1; MG/1
100 CAPSULE ORAL 2 TIMES DAILY
Qty: 20 CAPSULE | Refills: 0 | Status: SHIPPED | OUTPATIENT
Start: 2021-09-02 | End: 2021-09-12

## 2021-09-02 RX ORDER — PHENAZOPYRIDINE HYDROCHLORIDE 200 MG/1
200 TABLET, FILM COATED ORAL 3 TIMES DAILY PRN
Qty: 15 TABLET | Refills: 0 | Status: SHIPPED | OUTPATIENT
Start: 2021-09-02 | End: 2021-09-07

## 2021-09-02 ASSESSMENT — ENCOUNTER SYMPTOMS
CONSTIPATION: 0
ABDOMINAL PAIN: 0
NAUSEA: 0
BLOOD IN STOOL: 0
VOICE CHANGE: 0
COLOR CHANGE: 0
BACK PAIN: 0
COUGH: 0
TROUBLE SWALLOWING: 0
CHEST TIGHTNESS: 0
WHEEZING: 0
SORE THROAT: 0
ABDOMINAL DISTENTION: 0
SHORTNESS OF BREATH: 0
VOMITING: 0

## 2021-09-02 NOTE — PROGRESS NOTES
HPI:  Tessa Pfeiffer (: 1975) is a 41 y.o. female, Established patient, here for evaluation of the following chief complaint(s):  Follow-up (infertility/cycles. Had period in July with provera. Has not taken clomid.  has agreed to go for testing.)  Patient is complaining of suprapubic discomfort and hesitancy. She had a positive withdrawal with the Provera but did not take the Clomid this past month as she was unable to afford it. Her UCG today was negative      SUBJECTIVE/OBJECTIVE:    Past Surgical History:   Procedure Laterality Date    CERVICAL CERCLAGE  10 yrs ago    3 pregnancies & 3 cerclages    HYSTEROSCOPY DIAGNOSTIC N/A 2016    HYSTEROSCOPY OPERATIVE  LAPAROSCOPY, Icelandic SPEAKING  performed by Renee Leigh DO at [facility] Bilateral         Review of Systems   Constitutional: Negative for activity change, appetite change, fatigue and unexpected weight change. HENT: Negative for dental problem, ear pain, hearing loss, nosebleeds, sore throat, trouble swallowing and voice change. Eyes: Negative for visual disturbance. Respiratory: Negative for cough, chest tightness, shortness of breath and wheezing. Cardiovascular: Negative for chest pain and palpitations. Gastrointestinal: Negative for abdominal distention, abdominal pain, blood in stool, constipation, nausea and vomiting. Endocrine: Negative for cold intolerance, heat intolerance, polydipsia, polyphagia and polyuria. Genitourinary: Positive for menstrual problem. Negative for difficulty urinating, dyspareunia, dysuria, flank pain, frequency, genital sores, hematuria, pelvic pain, urgency, vaginal bleeding, vaginal discharge and vaginal pain. Musculoskeletal: Negative for arthralgias, back pain, joint swelling and myalgias. Skin: Negative for color change and rash. Allergic/Immunologic: Negative for environmental allergies, food allergies and immunocompromised state.

## 2021-10-04 DIAGNOSIS — B20 HIV INFECTION, UNSPECIFIED SYMPTOM STATUS (HCC): Primary | ICD-10-CM

## 2021-10-15 ENCOUNTER — TELEPHONE (OUTPATIENT)
Dept: INFECTIOUS DISEASES | Age: 46
End: 2021-10-15

## 2021-10-15 DIAGNOSIS — B20 HIV INFECTION, UNSPECIFIED SYMPTOM STATUS (HCC): ICD-10-CM

## 2021-10-15 LAB
ALBUMIN SERPL-MCNC: 4.2 G/DL (ref 3.5–4.6)
ALP BLD-CCNC: 46 U/L (ref 40–130)
ALT SERPL-CCNC: 10 U/L (ref 0–33)
ANION GAP SERPL CALCULATED.3IONS-SCNC: 9 MEQ/L (ref 9–15)
AST SERPL-CCNC: 13 U/L (ref 0–35)
BILIRUB SERPL-MCNC: <0.2 MG/DL (ref 0.2–0.7)
BUN BLDV-MCNC: 10 MG/DL (ref 6–20)
CALCIUM SERPL-MCNC: 9.5 MG/DL (ref 8.5–9.9)
CHLORIDE BLD-SCNC: 104 MEQ/L (ref 95–107)
CO2: 27 MEQ/L (ref 20–31)
CREAT SERPL-MCNC: 0.76 MG/DL (ref 0.5–0.9)
GFR AFRICAN AMERICAN: >60
GFR NON-AFRICAN AMERICAN: >60
GLOBULIN: 2.5 G/DL (ref 2.3–3.5)
GLUCOSE BLD-MCNC: 63 MG/DL (ref 70–99)
HCT VFR BLD CALC: 41 % (ref 37–47)
HEMOGLOBIN: 13.5 G/DL (ref 12–16)
MCH RBC QN AUTO: 30.4 PG (ref 27–31.3)
MCHC RBC AUTO-ENTMCNC: 33 % (ref 33–37)
MCV RBC AUTO: 92.3 FL (ref 82–100)
PDW BLD-RTO: 13.1 % (ref 11.5–14.5)
PLATELET # BLD: 211 K/UL (ref 130–400)
POTASSIUM SERPL-SCNC: 4.6 MEQ/L (ref 3.4–4.9)
RBC # BLD: 4.44 M/UL (ref 4.2–5.4)
SODIUM BLD-SCNC: 140 MEQ/L (ref 135–144)
TOTAL PROTEIN: 6.7 G/DL (ref 6.3–8)
WBC # BLD: 5 K/UL (ref 4.8–10.8)

## 2021-10-15 NOTE — TELEPHONE ENCOUNTER
Noted labs completed   Pt requesting refill on Genvoya. Spoke with Nestor Munoz at pt local. Medication will be filled. Pt updated.

## 2021-10-17 LAB
ABSOLUTE CD 4 HELPER: 393 CELLS/UL (ref 430–1800)
CD4 % HELPER T CELL: 32 % (ref 32–64)
LYMPHOCYTE SUBSET PANEL 2 INFO: ABNORMAL

## 2021-10-27 ENCOUNTER — VIRTUAL VISIT (OUTPATIENT)
Dept: INFECTIOUS DISEASES | Age: 46
End: 2021-10-27
Payer: COMMERCIAL

## 2021-10-27 DIAGNOSIS — B36.9 FUNGAL DERMATITIS: ICD-10-CM

## 2021-10-27 DIAGNOSIS — B20 HIV INFECTION, UNSPECIFIED SYMPTOM STATUS (HCC): Primary | ICD-10-CM

## 2021-10-27 DIAGNOSIS — F32.5 MAJOR DEPRESSIVE DISORDER WITH SINGLE EPISODE, IN FULL REMISSION (HCC): ICD-10-CM

## 2021-10-27 PROCEDURE — G8420 CALC BMI NORM PARAMETERS: HCPCS | Performed by: INTERNAL MEDICINE

## 2021-10-27 PROCEDURE — G8484 FLU IMMUNIZE NO ADMIN: HCPCS | Performed by: INTERNAL MEDICINE

## 2021-10-27 PROCEDURE — G8428 CUR MEDS NOT DOCUMENT: HCPCS | Performed by: INTERNAL MEDICINE

## 2021-10-27 PROCEDURE — 1036F TOBACCO NON-USER: CPT | Performed by: INTERNAL MEDICINE

## 2021-10-27 PROCEDURE — 99214 OFFICE O/P EST MOD 30 MIN: CPT | Performed by: INTERNAL MEDICINE

## 2021-10-27 RX ORDER — CITALOPRAM 20 MG/1
20 TABLET ORAL DAILY
Qty: 30 TABLET | Refills: 5 | Status: SHIPPED | OUTPATIENT
Start: 2021-10-27 | End: 2021-12-09

## 2021-10-27 RX ORDER — PRENATAL VIT 91/IRON/FOLIC/DHA 28-975-200
COMBINATION PACKAGE (EA) ORAL
Qty: 42 G | Refills: 2 | Status: SHIPPED | OUTPATIENT
Start: 2021-10-27 | End: 2021-12-09

## 2021-10-27 NOTE — PROGRESS NOTES
Luis Madden (:  1975) is a 40 y.o. female,Established patient, here for evaluation of the following chief complaint(s): No chief complaint on file. ASSESSMENT/PLAN:  HIV/AIDS - controlled. Mild major depression  Fungal dermatitis B feet  Counseling and prevention - Flu vaccine. Hx of brain lesions and intermittent headaches - follows with neuro and stable. Varicose veins - following with [facility]     Flu shot  COVID19 vaccine x 2  CBC CMP HIV VL CD4 RPR titer  Continue Genvoya and Celexa  Lamisil cream BID  F/U with OB GYN for abnormal pelvic U/S  F/U 3 months  SUBJECTIVE/OBJECTIVE:  HPI   F/U HIV, 100% compliance  Depression is better, works part time, on Celexa  Works at [employer] on Lamisil cream, for plantar rash, itching  Genvoya, Celexa  Works compressive socks  Review of Systems   All other systems reviewed and are negative. Physical Exam  Vitals and nursing note reviewed. Constitutional:       General: She is not in acute distress. HENT:      Head: Normocephalic. Nose: No congestion. Eyes:      General: No scleral icterus. Pulmonary:      Effort: Pulmonary effort is normal. No respiratory distress. Abdominal:      General: There is no distension. Musculoskeletal:         General: No swelling. Cervical back: No rigidity. Skin:     Coloration: Skin is not jaundiced. Findings: Rash (B plantar feet) present. No erythema. Neurological:      Mental Status: She is alert and oriented to person, place, and time. Psychiatric:         Mood and Affect: Mood normal.         Behavior: Behavior normal.         Judgment: Judgment normal.         B mammograms 2021 negative      Impression    IMPRESSION: THICKENED SLIGHTLY HETEROGENEOUS ENDOMETRIAL ECHO COMPLEX WITH DIFFERENTIAL CONSIDERATIONS AS DETAILED ABOVE. FURTHER ENDOMETRIAL ASSESSMENT IS RECOMMENDED.  HETEROGENEOUS MYOMETRIAL ECHOTEXTURE MORE PRONOUNCED LUNG THE ANTERIOR ASPECT    RAISING QUESTION OF FIBROID CHANGE OR ADENOMYOSIS. CONSIDER CORRELATION WITH MRI. OTHERWISE ESSENTIALLY UNREMARKABLE PELVIC ULTRASOUND       : Vinod Silva. Isadora Gonzalez MD    CD4 % Stringer T Cell 32  32 - 64 % Final 10/15/2021  6:52 AM [facility]   Absolute CD 4 Stringer 393Low   430 - 1800 cells/uL Final 10/15/2021  6:52 AM [facility]   Testing Performed By    10/19/2021 10:20 PM - Riaz, Chpo Incoming Lab Results From Soft    Component Value Ref Range & Units Status Collected Lab   HIV-1 QNT, IU/ML Not Detected  cpy/mL Final 10/15/2021  6:52 AM [facility]   HIV-1 QNT Log, IU/ML Not Detected  log cpy/mL Final 10/15/2021  6:52 AM [facility]   Interpretation Not Detected  Not Detected Final 10/15/2021  6:52 AM [facility]   INTERPRETIVE INFORMATION: HIV-1 by Quantitative NAAT, Plasma   Normal range for this assay is \"Not Detected\". CBC CMP normal    On this date 10/27/2021 I have spent 30 minutes reviewing previous notes, test results and face to face (virtual) with the patient discussing the diagnosis and importance of compliance with the treatment plan as well as documenting on the day of the visit. Juliet Madison, was evaluated through a synchronous (real-time) audio-video encounter. The patient (or guardian if applicable) is aware that this is a billable service. Verbal consent to proceed has been obtained within the past 12 months. The visit was conducted pursuant to the emergency declaration under the [address] [place] [address] authority and the [employer] General Act. Patient identification was verified, and a caregiver was present when appropriate. The patient was located in a state where the provider was credentialed to provide care. An electronic signature was used to authenticate this note.     --Avis Shah MD

## 2021-10-28 ENCOUNTER — NURSE ONLY (OUTPATIENT)
Dept: INFECTIOUS DISEASES | Age: 46
End: 2021-10-28
Payer: COMMERCIAL

## 2021-10-28 DIAGNOSIS — Z23 NEED FOR INFLUENZA VACCINATION: Primary | ICD-10-CM

## 2021-10-28 PROCEDURE — 90674 CCIIV4 VAC NO PRSV 0.5 ML IM: CPT | Performed by: INTERNAL MEDICINE

## 2021-10-28 PROCEDURE — 90471 IMMUNIZATION ADMIN: CPT | Performed by: INTERNAL MEDICINE

## 2021-10-28 NOTE — PROGRESS NOTES
Pt presented pt office for scheduled apt. Here today for Flu vaccine. Doing ok. No issues. No issues with medications. Vaccine Information Sheet, \"Influenza - Inactivated\"  given to [employer], or parent/legal guardian of  Kortney and verbalized understanding. Patient responses:    Have you ever had a reaction to a flu vaccine? No  Are you able to eat eggs without adverse effects? Yes  Do you have any current illness? No  Have you ever had Guillian Delavan Syndrome? No    Flu vaccine given per order. Please see immunization tab. After obtaining consent, and per orders of Dr. Cristian Cutler, injection of  0.5  m.l   FLU VACCINE  Was administered to RIGHT deltoid. Ovif:  Monty Damon #: [id number] EXP: June 30, 2022  [address]: [id number]     Patient tolerated well. Denies pain at site. Patient instructed to remain in clinic for 5-10 minutes afterwards, and to report any adverse reaction to me immediately. Will call office if needed.

## 2021-12-09 ENCOUNTER — OFFICE VISIT (OUTPATIENT)
Dept: FAMILY MEDICINE CLINIC | Age: 46
End: 2021-12-09
Payer: COMMERCIAL

## 2021-12-09 VITALS
OXYGEN SATURATION: 99 % | SYSTOLIC BLOOD PRESSURE: 100 MMHG | RESPIRATION RATE: 16 BRPM | HEIGHT: 64 IN | DIASTOLIC BLOOD PRESSURE: 68 MMHG | TEMPERATURE: 96.6 F | HEART RATE: 89 BPM | BODY MASS INDEX: 22.71 KG/M2 | WEIGHT: 133 LBS

## 2021-12-09 DIAGNOSIS — B20 HIV INFECTION, UNSPECIFIED SYMPTOM STATUS (HCC): Primary | ICD-10-CM

## 2021-12-09 DIAGNOSIS — F32.5 MAJOR DEPRESSIVE DISORDER WITH SINGLE EPISODE, IN FULL REMISSION (HCC): ICD-10-CM

## 2021-12-09 DIAGNOSIS — E55.9 VITAMIN D DEFICIENCY: ICD-10-CM

## 2021-12-09 DIAGNOSIS — B35.3 TINEA PEDIS, UNSPECIFIED LATERALITY: ICD-10-CM

## 2021-12-09 DIAGNOSIS — J30.2 SEASONAL ALLERGIC RHINITIS, UNSPECIFIED TRIGGER: Chronic | ICD-10-CM

## 2021-12-09 DIAGNOSIS — B35.3 TINEA PEDIS OF BOTH FEET: Chronic | ICD-10-CM

## 2021-12-09 DIAGNOSIS — Z12.11 SCREEN FOR COLON CANCER: ICD-10-CM

## 2021-12-09 DIAGNOSIS — F41.9 ANXIETY: Chronic | ICD-10-CM

## 2021-12-09 PROBLEM — G93.41 METABOLIC ENCEPHALOPATHY: Status: RESOLVED | Noted: 2020-03-26 | Resolved: 2021-12-09

## 2021-12-09 PROCEDURE — 99214 OFFICE O/P EST MOD 30 MIN: CPT | Performed by: FAMILY MEDICINE

## 2021-12-09 PROCEDURE — G8420 CALC BMI NORM PARAMETERS: HCPCS | Performed by: FAMILY MEDICINE

## 2021-12-09 PROCEDURE — 1036F TOBACCO NON-USER: CPT | Performed by: FAMILY MEDICINE

## 2021-12-09 PROCEDURE — G8427 DOCREV CUR MEDS BY ELIG CLIN: HCPCS | Performed by: FAMILY MEDICINE

## 2021-12-09 PROCEDURE — G8482 FLU IMMUNIZE ORDER/ADMIN: HCPCS | Performed by: FAMILY MEDICINE

## 2021-12-09 RX ORDER — CITALOPRAM 20 MG/1
20 TABLET ORAL DAILY
Qty: 30 TABLET | Refills: 5 | Status: SHIPPED | OUTPATIENT
Start: 2021-12-09 | End: 2022-06-14 | Stop reason: SDUPTHER

## 2021-12-09 RX ORDER — ACETAMINOPHEN 160 MG
TABLET,DISINTEGRATING ORAL
Qty: 30 CAPSULE | Refills: 12 | Status: SHIPPED | OUTPATIENT
Start: 2021-12-09

## 2021-12-09 RX ORDER — CETIRIZINE HYDROCHLORIDE 10 MG/1
10 TABLET ORAL DAILY PRN
Qty: 30 TABLET | Refills: 12 | Status: SHIPPED | OUTPATIENT
Start: 2021-12-09 | End: 2022-01-08

## 2021-12-09 RX ORDER — PRENATAL VIT 91/IRON/FOLIC/DHA 28-975-200
COMBINATION PACKAGE (EA) ORAL
Qty: 42 G | Refills: 5 | Status: SHIPPED | OUTPATIENT
Start: 2021-12-09

## 2021-12-09 NOTE — PROGRESS NOTES
[address] [facility], 40 y.o. female presents today with:  Chief Complaint   Patient presents with    6 Month Follow-Up    Pelvic Pain     for a little bit now           HPI    Tinea pedis. States that fungal infection of feet is back with itching and bumps on bottom of feet. HIV. Asymptomatic. Undetectable levels. CD4 stable. Followed by ID. Adherent to medication regimen. Major depression and anxiety. .  No new symptoms. Takes meds regularly.         No other questions and or concerns for today's visit      Review of Systems      Past Medical History:   Diagnosis Date    Abnormal Pap smear of cervix     Behavior disturbance     Breast disorder     Encephalopathy 2/10/2019    HIV (human immunodeficiency virus infection) ([facility] 2/25/2019    Infection due to urethral catheter (HCC)     Iron deficiency anemia secondary to inadequate dietary iron intake 9/2/1327    Metabolic encephalopathy 8/20/2637    Moderate malnutrition ([facility] 2/18/2019    Noncompliance     Obstructed fallopian tubes 10/31/2016    Oligoclonal bands in cerebrospinal fluid 7/1/2019    Seasonal allergic rhinitis 12/9/2021    Tinea pedis of both feet 12/9/2021     Past Surgical History:   Procedure Laterality Date    CERVICAL CERCLAGE  10 yrs ago    3 pregnancies & 3 cerclages    HYSTEROSCOPY DIAGNOSTIC N/A 11/14/2016    HYSTEROSCOPY OPERATIVE  LAPAROSCOPY, Niuean SPEAKING  performed by Drea Page DO at [address] Bilateral      Social History     Socioeconomic History    Marital status: Single     Spouse name: Not on file    Number of children: Not on file    Years of education: Not on file    Highest education level: Not on file   Occupational History    Not on file   Tobacco Use    Smoking status: Never Smoker    Smokeless tobacco: Never Used   Vaping Use    Vaping Use: Never used   Substance and Sexual Activity    Alcohol use: No     Comment: occasional    Drug use: No    Sexual activity: Not on file   Other Topics Concern    Not on file   Social History Narrative    Not on file     Social Determinants of Health     Financial Resource Strain: Low Risk     Difficulty of Paying Living Expenses: Not very hard   Food Insecurity: No Food Insecurity    Worried About Running Out of Food in the Last Year: Never true    Manuel of Food in the Last Year: Never true   Transportation Needs: No Transportation Needs    Lack of Transportation (Medical): No    Lack of Transportation (Non-Medical): No   Physical Activity:     Days of Exercise per Week: Not on file    Minutes of Exercise per Session: Not on file   Stress:     Feeling of Stress : Not on file   Social Connections:     Frequency of Communication with Friends and Family: Not on file    Frequency of Social Gatherings with Friends and Family: Not on file    Attends Yarsanism Services: Not on file    Active Member of [address] [place] [address] [employer] or Organizations: Not on file    Attends Club or Organization Meetings: Not on file    Marital Status: Not on file   Intimate Partner Violence:     Fear of Current or Ex-Partner: Not on file    Emotionally Abused: Not on file    Physically Abused: Not on file    Sexually Abused: Not on file   Housing Stability:     Unable to Pay for Housing in the Last Year: Not on file    Number of [place] in the Last Year: Not on file    Unstable Housing in the Last Year: Not on file     Family History   Problem Relation Age of Onset    High Blood Pressure Mother     Stroke Mother     Diabetes Mother     No Known Problems Father      Allergies   Allergen Reactions    Sulfa Antibiotics Swelling     Current Outpatient Medications   Medication Sig Dispense Refill    terbinafine (LAMISIL) 1 % cream Apply topically 2 times daily.  42 g 5    citalopram (CELEXA) 20 MG tablet Take 1 tablet by mouth daily 30 tablet 5    cetirizine (ZYRTEC) 10 MG tablet Take 1 tablet by mouth daily as needed for Allergies 30 tablet 12    Cholecalciferol (VITAMIN D3) 50 MCG (2000 UT) CAPS 1 po daily with meal 30 capsule 12    GENVOYA [id number] MG TABLET TAKE 1 TABLET DAILY  2     No current facility-administered medications for this visit. PMH, Surgical Hx, Family Hx, and Social Hxreviewed and updated. Health Maintenance reviewed. Objective    Vitals:    12/09/21 1141   BP: 100/68   Pulse: 89   Resp: 16   Temp: 96.6 °F (35.9 °C)   TempSrc: Temporal   SpO2: 99%   Weight: 133 lb (60.3 kg)   Height: 5' 4\" (1.626 m)        Physical Exam      Lab Results   Component Value Date    LABA1C 5.1 12/07/2020    LABA1C 5.1 07/24/2019    LABA1C 5.1 07/10/2019     Lab Results   Component Value Date    CREATININE 0.76 10/15/2021     Lab Results   Component Value Date    ALT 10 10/15/2021    AST 13 10/15/2021     Lab Results   Component Value Date    CHOL 216 (H) 05/13/2021    TRIG 115 05/13/2021    HDL 45 05/13/2021    LDLCALC 148 (H) 05/13/2021        Assessment & Plan   Visit Diagnoses and Associated Orders     HIV infection, unspecified symptom status ([email or web address]    -  Primary    Stable and well-controlled on current medications. Followed by ID. Major depressive disorder with single episode, in full remission (formerly [facility])        citalopram (CELEXA) 20 MG tablet [21062]           Tinea pedis, unspecified laterality        Topical terbinafine. Consider oral.         Anxiety        Stable and well-controlled on Lexapro. citalopram (CELEXA) 20 MG tablet [21062]           Major depressive disorder with single episode, [facility] full [facility]        Stable and well-controlled on Lexapro.     citalopram (CELEXA) 20 MG tablet [21062]           Tinea pedis of both feet        terbinafine (LAMISIL) 1 % cream [96776]           Seasonal allergic rhinitis, unspecified trigger        cetirizine (ZYRTEC) 10 MG tablet [6676]           Vitamin D deficiency        Cholecalciferol (VITAMIN D3) 50 MCG (2000 UT) CAPS [96007]           Screen for colon cancer        [address], [place], G[facility]ology[facility] [[email or web address] Custom]                   Reviewed with the patient: all disease processes, current clinical status, medications, activities and diet.      Side effects, adverse effects of the medication prescribed today, as well as treatment plan/ rationale and result expectations have been discussed with the patient who expresses understanding and desires to proceed.     Close follow up to evaluate treatment results and for coordination of care. I have reviewed the patient's medical history in detail and updated the computerized patient record. More than 50% of the appointment was spent in face-to-face counseling, education and care coordination. Please note this report has been partially produced using speech recognition software and may contain mistakes related to that system including errors in grammar, punctuation and spelling as well as words and phrases that may seem inappropriate. If there are questions or concerns, please feel free to contact me to clarify. Orders Placed This Encounter   Procedures   [address], MD [place], Gastroenterology, Marcelle Castro     Referral Priority:   Routine     Referral Type:   Eval and Treat     Referral Reason:   Specialty Services Required     Referred to Provider:   Trinh Hidalgo MD     Requested Specialty:   Gastroenterology     Number of Visits Requested:   1     Orders Placed This Encounter   Medications    terbinafine (LAMISIL) 1 % cream     Sig: Apply topically 2 times daily.      Dispense:  42 g     Refill:  5    citalopram (CELEXA) 20 MG tablet     Sig: Take 1 tablet by mouth daily     Dispense:  30 tablet     Refill:  5    cetirizine (ZYRTEC) 10 MG tablet     Sig: Take 1 tablet by mouth daily as needed for Allergies     Dispense:  30 tablet     Refill:  12    Cholecalciferol (VITAMIN D3) 50 MCG ( UT) CAPS     Si po daily with meal     Dispense:  30 capsule     Refill:  12 Medications Discontinued During This Encounter   Medication Reason    clomiPHENE (CLOMID) 50 MG tablet Therapy completed    medroxyPROGESTERone (PROVERA) 10 MG tablet Therapy completed    citalopram (CELEXA) 20 MG tablet LIST CLEANUP    clomiPHENE (CLOMID) 50 MG tablet Therapy completed    Prenatal Vit-Fe Fumarate-FA (PRENATAL LOW IRON) 27-0.8 MG TABS Therapy completed    Probiotic Product (ACIDOPHILUS/BIFIDUS PO) Therapy completed    topiramate (TOPAMAX) 25 MG tablet Therapy completed    SUMAtriptan (IMITREX) 100 MG tablet Side effects    Cholecalciferol (VITAMIN D3) 125 MCG (5000 UT) CAPS LIST CLEANUP    terbinafine (LAMISIL AT) 1 % cream LIST CLEANUP    citalopram (CELEXA) 20 MG tablet REORDER    terbinafine (LAMISIL) 1 % cream REORDER     Return in about 6 months (around 6/9/2022) for f/u - OV. Controlled Substance Monitoring:    Acute and Chronic Pain Monitoring:   RX Monitoring 1/2/2018   Attestation The Prescription Monitoring Report for this patient was reviewed today.            Cuauhtemoc Dewey MD

## 2022-01-24 RX ORDER — POLYETHYLENE GLYCOL 3350, SODIUM CHLORIDE, SODIUM BICARBONATE, POTASSIUM CHLORIDE 420; 11.2; 5.72; 1.48 G/4L; G/4L; G/4L; G/4L
4000 POWDER, FOR SOLUTION ORAL ONCE
Qty: 4000 ML | Refills: 0 | Status: SHIPPED | OUTPATIENT
Start: 2022-01-24 | End: 2022-01-24

## 2022-02-04 ENCOUNTER — ANCILLARY PROCEDURE (OUTPATIENT)
Dept: ENDOSCOPY | Age: 47
End: 2022-02-04
Attending: INTERNAL MEDICINE
Payer: COMMERCIAL

## 2022-02-04 ENCOUNTER — ANESTHESIA (OUTPATIENT)
Dept: ENDOSCOPY | Age: 47
End: 2022-02-04
Payer: COMMERCIAL

## 2022-02-04 ENCOUNTER — HOSPITAL ENCOUNTER (OUTPATIENT)
Age: 47
Setting detail: OUTPATIENT SURGERY
Discharge: HOME | End: 2022-02-04
Attending: INTERNAL MEDICINE | Admitting: INTERNAL MEDICINE
Payer: COMMERCIAL

## 2022-02-04 ENCOUNTER — ANESTHESIA EVENT (OUTPATIENT)
Dept: ENDOSCOPY | Age: 47
End: 2022-02-04
Payer: COMMERCIAL

## 2022-02-04 VITALS
DIASTOLIC BLOOD PRESSURE: 53 MMHG | OXYGEN SATURATION: 100 % | RESPIRATION RATE: 13 BRPM | SYSTOLIC BLOOD PRESSURE: 87 MMHG

## 2022-02-04 VITALS
OXYGEN SATURATION: 95 % | HEIGHT: 64 IN | TEMPERATURE: 97.6 F | BODY MASS INDEX: 20.49 KG/M2 | SYSTOLIC BLOOD PRESSURE: 103 MMHG | RESPIRATION RATE: 20 BRPM | WEIGHT: 120 LBS | HEART RATE: 76 BPM | DIASTOLIC BLOOD PRESSURE: 65 MMHG

## 2022-02-04 LAB
HCG, URINE, POC: NEGATIVE
Lab: NORMAL
NEGATIVE QC PASS/FAIL: NORMAL
POSITIVE QC PASS/FAIL: NORMAL

## 2022-02-04 PROCEDURE — 2580000003 HC RX 258

## 2022-02-04 PROCEDURE — 6370000000 HC RX 637 (ALT 250 FOR IP): Performed by: INTERNAL MEDICINE

## 2022-02-04 PROCEDURE — 45378 DIAGNOSTIC COLONOSCOPY: CPT | Performed by: INTERNAL MEDICINE

## 2022-02-04 PROCEDURE — 7100000010 HC PHASE II RECOVERY - FIRST 15 MIN: Performed by: INTERNAL MEDICINE

## 2022-02-04 PROCEDURE — 2500000003 HC RX 250 WO HCPCS: Performed by: NURSE ANESTHETIST, CERTIFIED REGISTERED

## 2022-02-04 PROCEDURE — 3700000000 HC ANESTHESIA ATTENDED CARE: Performed by: INTERNAL MEDICINE

## 2022-02-04 PROCEDURE — 2580000003 HC RX 258: Performed by: INTERNAL MEDICINE

## 2022-02-04 PROCEDURE — 7100000011 HC PHASE II RECOVERY - ADDTL 15 MIN: Performed by: INTERNAL MEDICINE

## 2022-02-04 PROCEDURE — 2709999900 HC NON-CHARGEABLE SUPPLY: Performed by: INTERNAL MEDICINE

## 2022-02-04 PROCEDURE — 3609027000 HC COLONOSCOPY: Performed by: INTERNAL MEDICINE

## 2022-02-04 PROCEDURE — 6360000002 HC RX W HCPCS: Performed by: NURSE ANESTHETIST, CERTIFIED REGISTERED

## 2022-02-04 PROCEDURE — 3700000001 HC ADD 15 MINUTES (ANESTHESIA): Performed by: INTERNAL MEDICINE

## 2022-02-04 RX ORDER — SIMETHICONE 20 MG/.3ML
EMULSION ORAL PRN
Status: DISCONTINUED | OUTPATIENT
Start: 2022-02-04 | End: 2022-02-04 | Stop reason: ALTCHOICE

## 2022-02-04 RX ORDER — MAGNESIUM HYDROXIDE 1200 MG/15ML
LIQUID ORAL PRN
Status: DISCONTINUED | OUTPATIENT
Start: 2022-02-04 | End: 2022-02-04 | Stop reason: ALTCHOICE

## 2022-02-04 RX ORDER — SODIUM CHLORIDE 9 MG/ML
INJECTION, SOLUTION INTRAVENOUS
Status: COMPLETED
Start: 2022-02-04 | End: 2022-02-04

## 2022-02-04 RX ORDER — SODIUM CHLORIDE 9 MG/ML
INJECTION, SOLUTION INTRAVENOUS CONTINUOUS
Status: DISCONTINUED | OUTPATIENT
Start: 2022-02-04 | End: 2022-02-04 | Stop reason: HOSPADM

## 2022-02-04 RX ORDER — LIDOCAINE HYDROCHLORIDE 20 MG/ML
INJECTION, SOLUTION INFILTRATION; PERINEURAL PRN
Status: DISCONTINUED | OUTPATIENT
Start: 2022-02-04 | End: 2022-02-04 | Stop reason: SDUPTHER

## 2022-02-04 RX ADMIN — SODIUM CHLORIDE: 9 INJECTION, SOLUTION INTRAVENOUS at 09:16

## 2022-02-04 RX ADMIN — LIDOCAINE HYDROCHLORIDE 50 MG: 20 INJECTION, SOLUTION INFILTRATION; PERINEURAL at 09:32

## 2022-02-04 RX ADMIN — PHENYLEPHRINE HYDROCHLORIDE 100 MCG: 10 INJECTION INTRAVENOUS at 09:38

## 2022-02-04 ASSESSMENT — PAIN - FUNCTIONAL ASSESSMENT: PAIN_FUNCTIONAL_ASSESSMENT: 0-10

## 2022-02-04 NOTE — H&P
Patient Name: Aidan Wilkerson  : 1975  MRN: [id number]  DATE: 22      ENDOSCOPY  History and Physical    Procedure:    [] Diagnostic Colonoscopy       [x] Screening Colonoscopy  [] EGD      [] ERCP      [] EUS       [] Other    [x] Previous office notes/History and Physical reviewed from the patients chart. Please see EMR for further details of HPI. I have examined the patient's status immediately prior to the procedure and:      Indications/HPI:    []Abdominal Pain   []Cancer- GI/Lung  []Fhx of colon CA/polyps  []History of Polyps   []Ellisons   []Melena  []Abnormal Imaging   []Dysphagia    []Persistent Pneumonia  []Anemia   []Food Impaction  []History of Polyps  []GI Bleed   []Pulmonary nodule/Mass  []Change in bowel habits  []Heartburn/Reflux  []Rectal Bleed (BRBPR)  []Chest Pain - Non Cardiac  []Heme (+) Stool  []Ulcers  []Constipation   []Hemoptysis   []Varices  []Diarrhea   []Hypoxemia  []Nausea/Vomiting   [x]Screening   []Crohns/Colitis  []Other:    Anesthesia:   [x] MAC [] Moderate Sedation   [] General   [] None     ROS: 12 pt Review of Symptoms was negative unless mentioned above    Medications:   Prior to Admission medications    Medication Sig Start Date End Date Taking? Authorizing Provider   terbinafine (LAMISIL) 1 % cream Apply topically 2 times daily. 21  Yes Kitty Pineda MD   citalopram (CELEXA) 20 MG tablet Take 1 tablet by mouth daily 21  Yes Kitty Pineda MD   Cholecalciferol (VITAMIN D3) 50 MCG (2000 UT) CAPS 1 po daily with meal 21  Yes Kitty Pineda MD   GENVOYA [phone] MG TABLET TAKE 1 TABLET DAILY 19  Yes Historical Provider, MD       Allergies:    Allergies   Allergen Reactions    Sulfa Antibiotics Swelling        History of allergic reaction to anesthesia:  No    Past Medical History:  Past Medical History:   Diagnosis Date    Abnormal Pap smear of cervix     Behavior disturbance     Breast disorder     Encephalopathy 2/10/2019    HIV (human immunodeficiency virus infection) ([facility] 2/25/2019    Infection due to urethral catheter (HCC)     Iron deficiency anemia secondary to inadequate dietary iron intake 8/9/3948    Metabolic encephalopathy 7/46/7231    Moderate malnutrition ([facility] 2/18/2019    Noncompliance     Obstructed fallopian tubes 10/31/2016    Oligoclonal bands in cerebrospinal fluid 7/1/2019    Seasonal allergic rhinitis 12/9/2021    Tinea pedis of both feet 12/9/2021       Past Surgical History:  Past Surgical History:   Procedure Laterality Date    CERVICAL CERCLAGE  10 yrs ago    3 pregnancies & 3 cerclages    HYSTEROSCOPY DIAGNOSTIC N/A 11/14/2016    HYSTEROSCOPY OPERATIVE  LAPAROSCOPY, Amharic SPEAKING  performed by Monica Clark DO at [address] [place] [address] Bilateral        Social History:  Social History     Tobacco Use    Smoking status: Never Smoker    Smokeless tobacco: Never Used   Vaping Use    Vaping Use: Never used   Substance Use Topics    Alcohol use: No     Comment: occasional    Drug use: No       Vital Signs:   Vitals:    02/04/22 0903   BP: 105/70   Pulse: 66   Resp: 16   Temp: 97.6 °F (36.4 °C)   SpO2: 100%        Physical Exam:  Cardiac:  [x]WNL  []Comments:  Pulmonary:  [x]WNL   []Comments:   Neuro/Mental Status:  [x]WNL  []Comments:  Abdominal:  [x]WNL    []Comments:  Other:   []WNL  []Comments:    Informed Consent:  The risks and benefits of the procedure have been discussed with either the patient or if they cannot consent, their representative. Assessment:  Patient examined and appropriate for planned sedation and procedure. Plan:  Proceed with planned sedation and procedure as above.     Olvin Maza MD  9:14 AM

## 2022-02-04 NOTE — PROGRESS NOTES
Discharge instructions and report reviewed with the patient with assist from Danii Davis #[id number] with  services. Patient verbalized understanding.

## 2022-02-04 NOTE — ANESTHESIA POSTPROCEDURE EVALUATION
Department of Anesthesiology  Postprocedure Note    Patient: Quoc Ramos  MRN: [id number]  YOB: 1975  Date of evaluation: 2/4/2022  Time:  9:45 AM     Procedure Summary     Date: 02/04/22 Room / Location: [facility] OR [address] [place] [address]    Anesthesia Start: 0302 Anesthesia Stop: 0945    Procedure: COLORECTAL CANCER SCREENING, NOT HIGH RISK (N/A ) Diagnosis: (Colon cancer screening Z12.11)    Surgeons: Toña Cordero MD Responsible Provider: JESU Yepez CRNA    Anesthesia Type: MAC ASA Status: 3          Anesthesia Type: MAC    Shaunna Phase I:      Shaunna Phase II:      Last vitals: Reviewed and per EMR flowsheets.        Anesthesia Post Evaluation    Patient location during evaluation: bedside  Patient participation: complete - patient participated  Level of consciousness: awake and awake and alert  Pain score: 0  Airway patency: patent  Nausea & Vomiting: no nausea and no vomiting  Complications: no  Cardiovascular status: blood pressure returned to baseline and hemodynamically stable  Respiratory status: acceptable  Hydration status: euvolemic

## 2022-02-04 NOTE — ANESTHESIA PRE PROCEDURE
Department of Anesthesiology  Preprocedure Note       Name:  Armaan More   Age:  40 y.o.  :  1975                                          MRN:  [id number]         Date:  2022      Surgeon: Lida Benitez):  Maurice Shukla MD    Procedure: Procedure(s):  COLORECTAL CANCER SCREENING, NOT HIGH RISK    Medications prior to admission:   Prior to Admission medications    Medication Sig Start Date End Date Taking? Authorizing Provider   terbinafine (LAMISIL) 1 % cream Apply topically 2 times daily. 21   Jones Ferguson MD   citalopram (CELEXA) 20 MG tablet Take 1 tablet by mouth daily 21   Jones Ferguson MD   Cholecalciferol (VITAMIN D3) 50 MCG (2000) CAPS 1 po daily with meal 21   Jones Ferguson MD   GENVOYA [phone] MG TABLET TAKE 1 TABLET DAILY 19   Historical Provider, MD       Current medications:    Current Facility-Administered Medications   Medication Dose Route Frequency Provider Last Rate Last Admin    sodium chloride 0.9 % infusion                Allergies:     Allergies   Allergen Reactions    Sulfa Antibiotics Swelling       Problem List:    Patient Active Problem List   Diagnosis Code    Major depressive disorder with single episode, in full remission ([facility] [address] F32.5    HIV (human immunodeficiency virus infection) ([facility] [address] B20    Elevated glucose R73.09    Oligoclonal bands in cerebrospinal fluid R83.8    Anxiety F41.9    PND (post-nasal drip) R09.82    Menstrual abnormality N92.6    Pain in pelvis R10.2    Chronic migraine without aura with status migrainosus, not intractable G43.701    Varicose veins of leg with pain, left I83.812    Palpitation R00.2    Tinea pedis of both feet B35.3    Seasonal allergic rhinitis J30.2       Past Medical History:        Diagnosis Date    Abnormal Pap smear of cervix     Behavior disturbance     Breast disorder     Encephalopathy 2/10/2019    HIV (human immunodeficiency virus infection) ([facility] [address] 2/25/2019    Infection due to urethral catheter (HCC)     Iron deficiency anemia secondary to inadequate dietary iron intake 7/5/4247    Metabolic encephalopathy 1/97/5429    Moderate malnutrition ([facility] [address] 2/18/2019    Noncompliance     Obstructed fallopian tubes 10/31/2016    Oligoclonal bands in cerebrospinal fluid 7/1/2019    Seasonal allergic rhinitis 12/9/2021    Tinea pedis of both feet 12/9/2021       Past Surgical History:        Procedure Laterality Date    CERVICAL CERCLAGE  10 yrs ago    3 pregnancies & 3 cerclages    HYSTEROSCOPY DIAGNOSTIC N/A 11/14/2016    HYSTEROSCOPY OPERATIVE  LAPAROSCOPY, Romanian SPEAKING  performed by Billie James DO at [address] Bilateral        Social History:    Social History     Tobacco Use    Smoking status: Never Smoker    Smokeless tobacco: Never Used   Substance Use Topics    Alcohol use: No     Comment: occasional                                Counseling given: Not Answered      Vital Signs (Current): There were no vitals filed for this visit.                                            BP Readings from Last 3 Encounters:   12/09/21 100/68   09/02/21 108/60   07/01/21 94/66       NPO Status:                                                                                 BMI:   Wt Readings from Last 3 Encounters:   12/09/21 133 lb (60.3 kg)   09/02/21 133 lb (60.3 kg)   07/01/21 133 lb (60.3 kg)     There is no height or weight on file to calculate BMI.    CBC:   Lab Results   Component Value Date    WBC 5.0 10/15/2021    RBC 4.44 10/15/2021    HGB 13.5 10/15/2021    HCT 41.0 10/15/2021    MCV 92.3 10/15/2021    RDW 13.1 10/15/2021     10/15/2021       CMP:   Lab Results   Component Value Date     10/15/2021    K 4.6 10/15/2021    K 4.3 02/19/2019     10/15/2021    CO2 27 10/15/2021    BUN 10 10/15/2021    CREATININE 0.76 10/15/2021    GFRAA >60.0 10/15/2021    LABGLOM >60.0 10/15/2021    GLUCOSE 63 10/15/2021    PROT 6.7 10/15/2021    CALCIUM 9.5 10/15/2021    BILITOT <0.2 10/15/2021    ALKPHOS 46 10/15/2021    AST 13 10/15/2021    ALT 10 10/15/2021       POC Tests: No results for input(s): POCGLU, POCNA, POCK, POCCL, POCBUN, POCHEMO, POCHCT in the last 72 hours. Coags: No results found for: PROTIME, INR, APTT    HCG (If Applicable):   Lab Results   Component Value Date    PREGTESTUR neg 03/09/2020        ABGs: No results found for: PHART, PO2ART, EWO0VUU, QZP8PQC, BEART, O8KHXQOJ     Type & Screen (If Applicable):  No results found for: LABABO, LABRH    Drug/Infectious Status (If Applicable):  No results found for: HIV, HEPCAB    COVID-19 Screening (If Applicable): No results found for: COVID19        Anesthesia Evaluation  Patient summary reviewed and Nursing notes reviewed no history of anesthetic complications:   Airway: Mallampati: II  TM distance: >3 FB   Neck ROM: full  Mouth opening: > = 3 FB Dental:          Pulmonary:Negative Pulmonary ROS                              Cardiovascular:  Exercise tolerance: good (>4 METS),         ECG reviewed               Beta Blocker:  Not on Beta Blocker         Neuro/Psych:   (+) headaches: migraine headaches, psychiatric history:            GI/Hepatic/Renal:   (+) bowel prep,           Endo/Other:              Pt had no PAT visit        ROS comment: HIV Abdominal:             Vascular: negative vascular ROS. Other Findings:             Anesthesia Plan      MAC     ASA 3       Induction: intravenous. Anesthetic plan and risks discussed with patient. Plan discussed with CRNA.                   Silvia Pack, JESU - LEONEL   2/4/2022

## 2022-02-10 ENCOUNTER — TELEPHONE (OUTPATIENT)
Dept: INFECTIOUS DISEASES | Age: 47
End: 2022-02-10

## 2022-02-10 NOTE — TELEPHONE ENCOUNTER
Per request, CM provided food vouchers, need assessed. Cm provided 1x1, no specific issues discussed.

## 2022-03-07 DIAGNOSIS — B20 HIV INFECTION, UNSPECIFIED SYMPTOM STATUS (HCC): Primary | ICD-10-CM

## 2022-03-08 DIAGNOSIS — B20 HIV INFECTION, UNSPECIFIED SYMPTOM STATUS (HCC): ICD-10-CM

## 2022-03-08 LAB
ALBUMIN SERPL-MCNC: 4.5 G/DL (ref 3.5–4.6)
ALP BLD-CCNC: 63 U/L (ref 40–130)
ALT SERPL-CCNC: 20 U/L (ref 0–33)
ANION GAP SERPL CALCULATED.3IONS-SCNC: 11 MEQ/L (ref 9–15)
AST SERPL-CCNC: 20 U/L (ref 0–35)
BILIRUB SERPL-MCNC: <0.2 MG/DL (ref 0.2–0.7)
BUN BLDV-MCNC: 7 MG/DL (ref 6–20)
CALCIUM SERPL-MCNC: 9.2 MG/DL (ref 8.5–9.9)
CHLORIDE BLD-SCNC: 101 MEQ/L (ref 95–107)
CHOLESTEROL, TOTAL: 200 MG/DL (ref 0–199)
CO2: 25 MEQ/L (ref 20–31)
CREAT SERPL-MCNC: 0.59 MG/DL (ref 0.5–0.9)
GFR AFRICAN AMERICAN: >60
GFR NON-AFRICAN AMERICAN: >60
GLOBULIN: 3.3 G/DL (ref 2.3–3.5)
GLUCOSE BLD-MCNC: 82 MG/DL (ref 70–99)
HCT VFR BLD CALC: 44.1 % (ref 37–47)
HDLC SERPL-MCNC: 53 MG/DL (ref 40–59)
HEMOGLOBIN: 14.1 G/DL (ref 12–16)
HEPATITIS C ANTIBODY: NONREACTIVE
HIV AG/AB: REACTIVE
LDL CHOLESTEROL CALCULATED: 126 MG/DL (ref 0–129)
MCH RBC QN AUTO: 28.5 PG (ref 27–31.3)
MCHC RBC AUTO-ENTMCNC: 32 % (ref 33–37)
MCV RBC AUTO: 88.9 FL (ref 82–100)
PDW BLD-RTO: 12.5 % (ref 11.5–14.5)
PLATELET # BLD: 192 K/UL (ref 130–400)
POTASSIUM SERPL-SCNC: 3.7 MEQ/L (ref 3.4–4.9)
RBC # BLD: 4.96 M/UL (ref 4.2–5.4)
SODIUM BLD-SCNC: 137 MEQ/L (ref 135–144)
TOTAL PROTEIN: 7.8 G/DL (ref 6.3–8)
TRIGL SERPL-MCNC: 107 MG/DL (ref 0–150)
WBC # BLD: 2.6 K/UL (ref 4.8–10.8)

## 2022-03-09 ENCOUNTER — TELEPHONE (OUTPATIENT)
Dept: INFECTIOUS DISEASES | Age: 47
End: 2022-03-09

## 2022-03-09 LAB
HIV INTERPRETATION: ABNORMAL
HIV-1 ANTIBODY: POSITIVE
HIV-2 AB: NEGATIVE
RPR TITER: NORMAL
RPR: REACTIVE

## 2022-03-09 RX ORDER — ELVITEGRAVIR, COBICISTAT, EMTRICITABINE, AND TENOFOVIR ALAFENAMIDE 150; 150; 200; 10 MG/1; MG/1; MG/1; MG/1
1 TABLET ORAL DAILY
Qty: 30 TABLET | Refills: 5 | Status: SHIPPED | OUTPATIENT
Start: 2022-03-09 | End: 2022-03-31 | Stop reason: SDUPTHER

## 2022-03-10 LAB
ABSOLUTE CD 4 HELPER: 485 CELLS/UL (ref 430–1800)
CD4 % HELPER T CELL: 35 % (ref 32–64)
LYMPHOCYTE SUBSET PANEL 2 INFO: NORMAL

## 2022-03-11 ENCOUNTER — TELEPHONE (OUTPATIENT)
Dept: INFECTIOUS DISEASES | Age: 47
End: 2022-03-11

## 2022-03-11 DIAGNOSIS — B20 HIV INFECTION, UNSPECIFIED SYMPTOM STATUS (HCC): Primary | ICD-10-CM

## 2022-03-11 LAB
C. TRACHOMATIS DNA ,URINE: NEGATIVE
N. GONORRHOEAE DNA, URINE: NEGATIVE
QUANTI TB GOLD PLUS: NEGATIVE
QUANTI TB1 MINUS NIL: 0 IU/ML (ref 0–0.34)
QUANTI TB2 MINUS NIL: 0 IU/ML (ref 0–0.34)
QUANTIFERON MITOGEN: 6.54 IU/ML
QUANTIFERON NIL: 0.02 IU/ML

## 2022-03-14 DIAGNOSIS — B20 HIV INFECTION, UNSPECIFIED SYMPTOM STATUS (HCC): Primary | ICD-10-CM

## 2022-03-14 NOTE — TELEPHONE ENCOUNTER
Noted lab order error HIV VL    Call placed to pt to discuss need for redraw. Reviewed needed lab. Order placed. Pt will complete las as soon as she is able. Pt verbalized understanding of importance of completing redraw as well.

## 2022-03-17 ENCOUNTER — TELEMEDICINE (OUTPATIENT)
Dept: INFECTIOUS DISEASES | Age: 47
End: 2022-03-17
Payer: COMMERCIAL

## 2022-03-17 ENCOUNTER — TELEPHONE (OUTPATIENT)
Dept: INFECTIOUS DISEASES | Age: 47
End: 2022-03-17

## 2022-03-17 DIAGNOSIS — B20 HIV INFECTION, UNSPECIFIED SYMPTOM STATUS (HCC): ICD-10-CM

## 2022-03-17 DIAGNOSIS — B20 HUMAN IMMUNODEFICIENCY VIRUS (HCC): Primary | ICD-10-CM

## 2022-03-17 PROCEDURE — G8420 CALC BMI NORM PARAMETERS: HCPCS | Performed by: INTERNAL MEDICINE

## 2022-03-17 PROCEDURE — 1036F TOBACCO NON-USER: CPT | Performed by: INTERNAL MEDICINE

## 2022-03-17 PROCEDURE — 99213 OFFICE O/P EST LOW 20 MIN: CPT | Performed by: INTERNAL MEDICINE

## 2022-03-17 PROCEDURE — G8428 CUR MEDS NOT DOCUMENT: HCPCS | Performed by: INTERNAL MEDICINE

## 2022-03-17 PROCEDURE — G8482 FLU IMMUNIZE ORDER/ADMIN: HCPCS | Performed by: INTERNAL MEDICINE

## 2022-03-17 NOTE — PROGRESS NOTES
Sita Courtney  1975  female  Medical Record Number: [id number]    3/17/2022    TELEHEALTH EVALUATION -- Audio/Visual (During PDADO-42 public health emergency)      Sita Courtney (:  1975) has requested an audio/video evaluation for the following concern(s):    HIV    Due to the COVID-19 outbreak, patient's office visit was converted to a virtual visit. Patient was contacted and agreed to proceed with a virtual visit with me via Doxy. me with my location at the office. Patient reports that they are located at home. The risks and benefits of converting to a virtual visit were discussed in light of the current infectious disease epidemic. Services were provided through an audio/video synchronous discussion virtually to substitute for in person clinic visit. THIS virtual visit was conducted via interactive/real-time audio/video. Due to this being a TeleHealth encounter, evaluation of the following organ systems is limited: Vitals/Constitutional/EENT/Resp/CV/GI//MS/Neuro/Skin/Heme-Lymph-Imm.}    Pursuant to the emergency declaration under the [facility] waiver authority and the [employer] Act, this Virtual Visit was conducted, with patient's consent, to reduce the patient's risk of exposure to COVID-19 and provide care for the patient. Infectious Disease Progress Note    Patient is a followup for HIV  Serofast syphilis titer hx in HIV. Has a PCP and follows regularly. She is HIV controlled. Viral load for her and her partner negative. All 14 review of systems discussed and negative other than as stated as above.      Since we cannot conduct an in-person exam, the following were addressed with the patient to the best of my capability via virtual visit:   Patient does not perceive any new visual deficits  No diaphoresis or flushing in the face  Patient is able to flex and extend her neck with ease. Patient can inhale and exhale without any difficulty and chest seems to be expanding symmetrically. No conversational dyspnea. Patient does not feel any palpitations   Patient's  abdomen is not protruberant beyond their normal size. No new swelling of their joints. No observed neurological changes or slurred speech when speaking to the patient    No new skin rash or ulcers      Imaging and labs were reviewed per medical records and any ID pertinent labs were addressed with the patient. Lab Results   Component Value Date    WBC 2.6 (L) 03/08/2022    HGB 14.1 03/08/2022    HCT 44.1 03/08/2022    MCV 88.9 03/08/2022     03/08/2022       Assessment:  HIV/AIDS - Genvoya, 100% compliance. Controlled. Plan:  Compliance stressed   Continue Genvoya  RPR is reactive but her T pallidum confirmation is NEGATIVE so this is a serofast in HIV. Needs to see the eye doc. There was an issue getting her into the last one. Needs new referral - discussed with nurse. Notes reviewed from her PCP visits. Time spent today in combination of reviewing patient's chart, medications, labs, pictures when pertinent, provider communication as necessary, counseling patient, care/coordination not otherwise reported here, and patient face to face virtual visit 30 min.   >50% of that time spent counseling and coordination of patient care  Patient was also appropriately counseled on preventive measures such as vaccinations, the importance of annual exam with their PCP, and the importance of health maintenance by dietary and exercise regimens. All questions were answered from an ID perspective and to the best of my ability.           Melissa Glover D.O.

## 2022-03-23 LAB
HIV-1 QNT LOG, IU/ML: <1.47 LOG CPY/ML
HIV-1 QNT, IU/ML: ABNORMAL CPY/ML
INTERPRETATION: DETECTED

## 2022-03-31 ENCOUNTER — NURSE ONLY (OUTPATIENT)
Dept: INFECTIOUS DISEASES | Age: 47
End: 2022-03-31

## 2022-03-31 DIAGNOSIS — B20 HIV INFECTION, UNSPECIFIED SYMPTOM STATUS (HCC): Primary | ICD-10-CM

## 2022-03-31 DIAGNOSIS — E55.9 VITAMIN D DEFICIENCY: Primary | ICD-10-CM

## 2022-03-31 RX ORDER — ELVITEGRAVIR, COBICISTAT, EMTRICITABINE, AND TENOFOVIR ALAFENAMIDE 150; 150; 200; 10 MG/1; MG/1; MG/1; MG/1
1 TABLET ORAL DAILY
Qty: 30 TABLET | Refills: 5 | Status: SHIPPED | OUTPATIENT
Start: 2022-03-31

## 2022-03-31 NOTE — PROGRESS NOTES
Reviewed labs with pt. Vit D not ordered at that time. Order placed ok per Dr Kendra Pandey. She will complete today if possible.

## 2022-03-31 NOTE — PROGRESS NOTES
Re-Assessment      Patient ID:   Abiel Marquez  Date:   3/31/2022      Client ID:  [id number]    [address] [place] [address]  [place] [address] (home)     Family/House:    [] Partner  [] Children  [] Other -     Mental Health:   Hx of MH    Substance Abuse:   Marijuana Use  Social History     Socioeconomic History    Marital status: Single     Spouse name: Not on file    Number of children: Not on file    Years of education: Not on file    Highest education level: Not on file   Occupational History    Not on file   Tobacco Use    Smoking status: Never Smoker    Smokeless tobacco: Never Used   Vaping Use    Vaping Use: Never used   Substance and Sexual Activity    Alcohol use: No     Comment: occasional    Drug use: No    Sexual activity: Not on file   Other Topics Concern    Not on file   Social History Narrative    Not on file     Social Determinants of Health     Financial Resource Strain: Low Risk     Difficulty of Paying Living Expenses: Not very hard   Food Insecurity: No Food Insecurity    Worried About Running Out of Food in the Last Year: Never true    Manuel of Food in the Last Year: Never true   Transportation Needs: No Transportation Needs    Lack of Transportation (Medical): No    Lack of Transportation (Non-Medical):  No   Physical Activity:     Days of Exercise per Week: Not on file    Minutes of Exercise per Session: Not on file   Stress:     Feeling of Stress : Not on file   Social Connections:     Frequency of Communication with Friends and Family: Not on file    Frequency of Social Gatherings with Friends and Family: Not on file    Attends Oriental orthodox Services: Not on file    Active Member of Clubs or Organizations: Not on file    Attends Club or Organization Meetings: Not on file    Marital Status: Not on file   Intimate Partner Violence:     Fear of Current or Ex-Partner: Not on file    Emotionally Abused: Not on file    Physically Abused: Not on file  Sexually Abused: Not on file   Housing Stability:     Unable to Pay for Housing in the Last Year: Not on file    Number of Places Lived in the Last Year: Not on file    Unstable Housing in the Last Year: Not on file       Housing:   with family    Health/Healthcare:  HIV Stable   Physician Visit:  Dr. Yolanda Mckinnon 3/22   Dental Visit:  [facility]    CD4:   Lab Results   Component Value Date    LABABSO 485 03/08/2022       Viral Load:  Lab Results   Component Value Date    GDY2VGLLQY <1.47 03/17/2022       Medical Insurance:  Payor: Judd End / Plan: Taran Christianson / Product Type: *No Product type* /    OHDAP/HIPSCA:  NA   Renewal Date:  NA    Income:    Employment    Legal Issues:    NA    Emergency Contact:   Extended Emergency Contact Information  Primary Emergency Contact: Mary Moy  Address: [address] [place] [address] drive [facility], [address] [place] [address] Phone: [phone]  Mobile Phone: [phone]  Relation: Other  Secondary Emergency Contact: Shyanne Koenig [address] Phone: [phone]  Relation: Child  Pt presented for scheduled appointment with CM. CM completed Annual Review, updated RW Part A Eligibility for services. Pt works part-time, has [address] [facility]. CM reviewed Grievance Policy, Sliding Scale Fee and Transportation Policy with pt. Cm completed PSA and SA/MH Assessments. Pt has hx of MH and smokes marijuana daily. Anxiety and depression managed by PCP, pt feels no need for counseling at this time. CM updated ISP, pt agreed with POC. Pt remains compliant with meds. VL is undetectable. Pt's last dental visit was 2 months ago. CM referred pt to [facility] for extensive dental services as needed. Pt is sexually active. Pt is aware of U=U, undetectable equals un-transmittable. CM provided socialization. Pt discussed issues with her oldest daughter who went to [facility], [address]  and got . Pt was notified by text.   Pt also discussed concerns regarding 18y/o daughter, who has a boyfriend. Cm provided active listening and support. CM provided food and gas vouchers, need assessed. Provisions as last resort. CM will follow up with pt as needed.

## 2022-06-13 ENCOUNTER — TELEPHONE (OUTPATIENT)
Dept: INFECTIOUS DISEASES | Age: 47
End: 2022-06-13

## 2022-06-13 NOTE — TELEPHONE ENCOUNTER
Per request, CM scheduled transport to appointment with PCP 6/14/22 @ 10:30AM.  CM assessed need, arrangements as last resort.

## 2022-06-14 ENCOUNTER — OFFICE VISIT (OUTPATIENT)
Dept: FAMILY MEDICINE CLINIC | Age: 47
End: 2022-06-14
Payer: COMMERCIAL

## 2022-06-14 VITALS
TEMPERATURE: 96.5 F | HEART RATE: 70 BPM | BODY MASS INDEX: 20.14 KG/M2 | DIASTOLIC BLOOD PRESSURE: 62 MMHG | SYSTOLIC BLOOD PRESSURE: 112 MMHG | HEIGHT: 64 IN | WEIGHT: 118 LBS | OXYGEN SATURATION: 98 %

## 2022-06-14 DIAGNOSIS — F32.5 MAJOR DEPRESSIVE DISORDER WITH SINGLE EPISODE, IN FULL REMISSION (HCC): ICD-10-CM

## 2022-06-14 DIAGNOSIS — M77.00 MEDIAL EPICONDYLITIS OF ELBOW, UNSPECIFIED LATERALITY: ICD-10-CM

## 2022-06-14 DIAGNOSIS — M25.562 ACUTE PAIN OF BOTH KNEES: ICD-10-CM

## 2022-06-14 DIAGNOSIS — Z00.00 ENCOUNTER FOR WELL ADULT EXAM WITHOUT ABNORMAL FINDINGS: Primary | ICD-10-CM

## 2022-06-14 DIAGNOSIS — M25.561 ACUTE PAIN OF BOTH KNEES: ICD-10-CM

## 2022-06-14 DIAGNOSIS — F41.9 ANXIETY: Chronic | ICD-10-CM

## 2022-06-14 PROCEDURE — 99396 PREV VISIT EST AGE 40-64: CPT | Performed by: FAMILY MEDICINE

## 2022-06-14 PROCEDURE — G8427 DOCREV CUR MEDS BY ELIG CLIN: HCPCS | Performed by: FAMILY MEDICINE

## 2022-06-14 PROCEDURE — 1036F TOBACCO NON-USER: CPT | Performed by: FAMILY MEDICINE

## 2022-06-14 PROCEDURE — G8420 CALC BMI NORM PARAMETERS: HCPCS | Performed by: FAMILY MEDICINE

## 2022-06-14 PROCEDURE — 99214 OFFICE O/P EST MOD 30 MIN: CPT | Performed by: FAMILY MEDICINE

## 2022-06-14 RX ORDER — CITALOPRAM 20 MG/1
20 TABLET ORAL DAILY
Qty: 30 TABLET | Refills: 5 | Status: SHIPPED | OUTPATIENT
Start: 2022-06-14

## 2022-06-14 SDOH — ECONOMIC STABILITY: FOOD INSECURITY: WITHIN THE PAST 12 MONTHS, THE FOOD YOU BOUGHT JUST DIDN'T LAST AND YOU DIDN'T HAVE MONEY TO GET MORE.: NEVER TRUE

## 2022-06-14 SDOH — ECONOMIC STABILITY: FOOD INSECURITY: WITHIN THE PAST 12 MONTHS, YOU WORRIED THAT YOUR FOOD WOULD RUN OUT BEFORE YOU GOT MONEY TO BUY MORE.: NEVER TRUE

## 2022-06-14 ASSESSMENT — PATIENT HEALTH QUESTIONNAIRE - PHQ9
SUM OF ALL RESPONSES TO PHQ QUESTIONS 1-9: 2
2. FEELING DOWN, DEPRESSED OR HOPELESS: 1
10. IF YOU CHECKED OFF ANY PROBLEMS, HOW DIFFICULT HAVE THESE PROBLEMS MADE IT FOR YOU TO DO YOUR WORK, TAKE CARE OF THINGS AT HOME, OR GET ALONG WITH OTHER PEOPLE: 0
5. POOR APPETITE OR OVEREATING: 0
SUM OF ALL RESPONSES TO PHQ QUESTIONS 1-9: 2
3. TROUBLE FALLING OR STAYING ASLEEP: 0
SUM OF ALL RESPONSES TO PHQ QUESTIONS 1-9: 2
SUM OF ALL RESPONSES TO PHQ9 QUESTIONS 1 & 2: 2
8. MOVING OR SPEAKING SO SLOWLY THAT OTHER PEOPLE COULD HAVE NOTICED. OR THE OPPOSITE, BEING SO FIGETY OR RESTLESS THAT YOU HAVE BEEN MOVING AROUND A LOT MORE THAN USUAL: 0
4. FEELING TIRED OR HAVING LITTLE ENERGY: 0
6. FEELING BAD ABOUT YOURSELF - OR THAT YOU ARE A FAILURE OR HAVE LET YOURSELF OR YOUR FAMILY DOWN: 0
7. TROUBLE CONCENTRATING ON THINGS, SUCH AS READING THE NEWSPAPER OR WATCHING TELEVISION: 0
1. LITTLE INTEREST OR PLEASURE IN DOING THINGS: 1
9. THOUGHTS THAT YOU WOULD BE BETTER OFF DEAD, OR OF HURTING YOURSELF: 0
SUM OF ALL RESPONSES TO PHQ QUESTIONS 1-9: 2

## 2022-06-14 ASSESSMENT — SOCIAL DETERMINANTS OF HEALTH (SDOH): HOW HARD IS IT FOR YOU TO PAY FOR THE VERY BASICS LIKE FOOD, HOUSING, MEDICAL CARE, AND HEATING?: NOT HARD AT ALL

## 2022-06-14 NOTE — PROGRESS NOTES
Well Adult Note  Name: Lovely Weller Date: 2022   MRN: [id number] Sex: Female   Age: 40 y.o. Ethnicity:  /    : 1975 Race:  / Haringris Augustin is here for well adult exam.  History:  Tinea pedis. States that fungal infection of feet is back with itching and bumps on bottom of feet.     HIV. Asymptomatic. Does have detectable levels. CD4 improved. RPR+ at 1:2  Followed by ID. Adherent to medication regimen.     Major depression and anxiety. Having episodes of sadness and tearfulness when she doesn't take the Lexapro. Has started to take it daily recently. B/L knee pain and B/L elbow pain. No swelling or redness or fever. 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 Review of Systems affirmed LEOLA LEE note    Allergies   Allergen Reactions    Sulfa Antibiotics Swelling         Prior to Visit Medications    Medication Sig Taking? Authorizing Provider   GENVOYA [phone] MG TABLET Take 1 tablet by mouth daily Yes Melissa Glover DO   terbinafine (LAMISIL) 1 % cream Apply topically 2 times daily.  Yes Wilner Wallis MD   citalopram (CELEXA) 20 MG tablet Take 1 tablet by mouth daily Yes Wilner Wallis MD   Cholecalciferol (VITAMIN D3) 50 MCG (2000) CAPS 1 po daily with meal Yes Wilner Wallis MD         Past Medical History:   Diagnosis Date    Abnormal Pap smear of cervix     Behavior disturbance     Breast disorder     Encephalopathy 2/10/2019    HIV (human immunodeficiency virus infection) ([facility] [address] 2019    Infection due to urethral catheter (HCC)     Iron deficiency anemia secondary to inadequate dietary iron intake 4418    Metabolic encephalopathy     Moderate malnutrition ([facility] [address] 2019    Noncompliance     Obstructed fallopian tubes 10/31/2016    Oligoclonal bands in cerebrospinal fluid 2019    Seasonal allergic rhinitis 2021    Tinea pedis of both feet 12/9/2021       Past Surgical History:   Procedure Laterality Date    CERVICAL CERCLAGE  10 yrs ago    3 pregnancies & 3 cerclages    COLONOSCOPY N/A 2/4/2022    COLORECTAL CANCER SCREENING, NOT HIGH RISK performed by Ortega Bocanegra MD at [address] [facility] HYSTEROSCOPY DIAGNOSTIC N/A 11/14/2016    HYSTEROSCOPY OPERATIVE  LAPAROSCOPY, [place] SPEAKING  performed by Massimo Oquendo DO at [address] Bilateral          Family History   Problem Relation Age of Onset    High Blood Pressure Mother     Stroke Mother     Diabetes Mother     No Known Problems Father     Colon Cancer Neg Hx        Social History     Tobacco Use    Smoking status: Never Smoker    Smokeless tobacco: Never Used   Vaping Use    Vaping Use: Never used   Substance Use Topics    Alcohol use: No     Comment: occasional    Drug use: No       Objective   /62   Pulse 70   Temp (!) 96.5 °F (35.8 °C) (Temporal)   Ht 5' 4\" (1.626 m)   Wt 118 lb (53.5 kg)   SpO2 98%   BMI 20.25 kg/m²   Wt Readings from Last 3 Encounters:   06/14/22 118 lb (53.5 kg)   02/04/22 120 lb (54.4 kg)   12/09/21 133 lb (60.3 kg)     There were no vitals filed for this visit. Physical Exam  Constitutional:       General: She is not in acute distress. Appearance: She is well-developed. HENT:      Head: Normocephalic and atraumatic. Eyes:      General: No scleral icterus. Conjunctiva/sclera: Conjunctivae normal.   Cardiovascular:      Rate and Rhythm: Normal rate and regular rhythm. Heart sounds: Normal heart sounds. Pulmonary:      Effort: Pulmonary effort is normal. No respiratory distress. Breath sounds: Normal breath sounds. No wheezing or rales. Abdominal:      General: Bowel sounds are normal. There is no distension. Palpations: Abdomen is soft. There is no mass. Tenderness: There is no abdominal tenderness. Musculoskeletal:      Cervical back: Neck supple. No tenderness. Lymphadenopathy:      Cervical: No cervical adenopathy. Skin:     General: Skin is warm and dry. Neurological:      Mental Status: She is alert and oriented to person, place, and time. Assessment   Plan   Dolly Arredondo was seen today for 6 month follow-up. Diagnoses and all orders for this visit:    Encounter for well adult exam without abnormal findings    Anxiety  Comments:  Improving and fairly well-controlled on Lexapro. Orders:  -     citalopram (CELEXA) 20 MG tablet; Take 1 tablet by mouth daily    Major depressive disorder with single episode, in full remission ([address]  Comments:  Improving and fairly well-controlled on Lexapro. Orders:  -     citalopram (CELEXA) 20 MG tablet; Take 1 tablet by mouth daily    Acute pain of both knees  Comments:  Suspect overuse. Apply ice after work. Lidocaine patches. Unable to take NSAIDs. APAP prn    Medial epicondylitis of elbow, unspecified laterality  Comments:  Suspect overuse. Wear  elbow band. Apply ice after work. Lidocaine patches. Unable to take NSAIDs. APAP prn.            Personalized Preventive Plan   Current Health Maintenance Status  Immunization History   Administered Date(s) Administered    COVID-19, Pfizer Purple top, DILUTE for use, 12+ yrs, 30mcg/0.3mL dose 05/21/2021, 06/11/2021    Hepatitis A/Hepatitis B (Twinrix) 04/24/2019, 06/19/2019, 12/18/2019    Influenza Vaccine, unspecified formulation 12/08/2014, 02/23/2016, 10/01/2018    Influenza Virus Vaccine 12/08/2014, 02/23/2016, 10/01/2018, 12/05/2019    Influenza, MDCK Quadv, IM, PF (Flucelvax 2 yrs and older) 10/28/2021    Influenza, MDCK Quadv, with preserv IM (Flucelvax 2 yrs and older) 10/22/2018    Influenza, Quadv, IM, (6 mo and older Fluzone, Flulaval, Fluarix and 3 yrs and older Afluria) 12/05/2019, 01/15/2021    Pneumococcal Conjugate 13-valent ([id number]) 12/18/2019    Pneumococcal Polysaccharide ([id number]) 06/11/2020    Tdap (Boostrix, Adacel) 10/22/2018, 04/24/2019 Health Maintenance   Topic Date Due    Depression Monitoring  Never done    COVID-19 Vaccine (3 - Pfizer risk 4-dose series) 07/09/2021    Irasema Aburto (MMR) vaccine (1 of 2 - Risk 2-dose series) 12/09/2022 (Originally 10/14/1994)    Meningococcal (ACWY) vaccine (1 - Risk start 2-23 months series) 12/09/2022 (Originally 5/14/1976)    A1C test (Diabetic or Prediabetic)  12/09/2099 (Originally 12/7/2021)    Pneumococcal 0-64 years Vaccine (3 - PPSV23 or PCV20) 06/11/2025    Cervical cancer screen  04/30/2026    Lipids  03/08/2027    DTaP/Tdap/Td vaccine (3 - Td or Tdap) 04/24/2029    Colorectal Cancer Screen  02/04/2032    Hepatitis A vaccine  Completed    Hepatitis B vaccine  Completed    Flu vaccine  Completed    Hepatitis C screen  Completed    Hib vaccine  Aged Out     Recommendations for [employer] Due: see orders and patient instructions/AVS.    Return in about 6 months (around 12/14/2022) for Mood Disorder - OV.

## 2022-07-07 ENCOUNTER — TELEPHONE (OUTPATIENT)
Dept: INFECTIOUS DISEASES | Age: 47
End: 2022-07-07

## 2022-07-07 DIAGNOSIS — B20 HIV INFECTION, UNSPECIFIED SYMPTOM STATUS (HCC): Primary | ICD-10-CM

## 2022-07-07 DIAGNOSIS — E55.9 VITAMIN D DEFICIENCY: ICD-10-CM

## 2022-07-07 DIAGNOSIS — B20 HIV INFECTION, UNSPECIFIED SYMPTOM STATUS (HCC): ICD-10-CM

## 2022-07-07 LAB
ALBUMIN SERPL-MCNC: 4.8 G/DL (ref 3.5–4.6)
ALP BLD-CCNC: 56 U/L (ref 40–130)
ALT SERPL-CCNC: 13 U/L (ref 0–33)
ANION GAP SERPL CALCULATED.3IONS-SCNC: 12 MEQ/L (ref 9–15)
AST SERPL-CCNC: 16 U/L (ref 0–35)
BILIRUB SERPL-MCNC: 0.7 MG/DL (ref 0.2–0.7)
BUN BLDV-MCNC: 11 MG/DL (ref 6–20)
CALCIUM SERPL-MCNC: 10.5 MG/DL (ref 8.5–9.9)
CHLORIDE BLD-SCNC: 100 MEQ/L (ref 95–107)
CO2: 26 MEQ/L (ref 20–31)
CREAT SERPL-MCNC: 0.65 MG/DL (ref 0.5–0.9)
GFR AFRICAN AMERICAN: >60
GFR NON-AFRICAN AMERICAN: >60
GLOBULIN: 3.2 G/DL (ref 2.3–3.5)
GLUCOSE BLD-MCNC: 72 MG/DL (ref 70–99)
HCT VFR BLD CALC: 43.5 % (ref 37–47)
HEMOGLOBIN: 14.7 G/DL (ref 12–16)
MCH RBC QN AUTO: 29.9 PG (ref 27–31.3)
MCHC RBC AUTO-ENTMCNC: 33.7 % (ref 33–37)
MCV RBC AUTO: 88.6 FL (ref 82–100)
PDW BLD-RTO: 13.3 % (ref 11.5–14.5)
PLATELET # BLD: 243 K/UL (ref 130–400)
POTASSIUM SERPL-SCNC: 4 MEQ/L (ref 3.4–4.9)
RBC # BLD: 4.91 M/UL (ref 4.2–5.4)
SODIUM BLD-SCNC: 138 MEQ/L (ref 135–144)
TOTAL PROTEIN: 8 G/DL (ref 6.3–8)
VITAMIN D 25-HYDROXY: 41.6 NG/ML
WBC # BLD: 5.3 K/UL (ref 4.8–10.8)

## 2022-07-08 LAB
ABSOLUTE CD 4 HELPER: 543 CELLS/UL (ref 430–1800)
CD4 % HELPER T CELL: 32 % (ref 32–64)
LYMPHOCYTE SUBSET PANEL 2 INFO: NORMAL

## 2022-07-09 LAB
HIV-1 QNT LOG, IU/ML: <1.47 LOG CPY/ML
HIV-1 QNT, IU/ML: ABNORMAL CPY/ML
INTERPRETATION: DETECTED

## 2022-07-13 ENCOUNTER — OFFICE VISIT (OUTPATIENT)
Dept: INFECTIOUS DISEASES | Age: 47
End: 2022-07-13
Payer: COMMERCIAL

## 2022-07-13 VITALS
SYSTOLIC BLOOD PRESSURE: 114 MMHG | DIASTOLIC BLOOD PRESSURE: 60 MMHG | WEIGHT: 133 LBS | BODY MASS INDEX: 22.83 KG/M2 | TEMPERATURE: 98.1 F | HEART RATE: 83 BPM

## 2022-07-13 DIAGNOSIS — B20 HIV INFECTION, UNSPECIFIED SYMPTOM STATUS (HCC): Primary | ICD-10-CM

## 2022-07-13 PROCEDURE — G8420 CALC BMI NORM PARAMETERS: HCPCS | Performed by: INTERNAL MEDICINE

## 2022-07-13 PROCEDURE — 1036F TOBACCO NON-USER: CPT | Performed by: INTERNAL MEDICINE

## 2022-07-13 PROCEDURE — 99213 OFFICE O/P EST LOW 20 MIN: CPT | Performed by: INTERNAL MEDICINE

## 2022-07-13 PROCEDURE — G8427 DOCREV CUR MEDS BY ELIG CLIN: HCPCS | Performed by: INTERNAL MEDICINE

## 2022-07-13 NOTE — PROGRESS NOTES
Pt requesting animal support letter. Letter written and signed by Dr Elieser Cannon. Copy made and given to pt. Otherwise  Doing ok. Skin has now cleared up.    Follow up in 6 months   Requested she call office if needed

## 2022-07-24 NOTE — PROGRESS NOTES
Greg Mac  1975  female  Medical Record Number: [id number]      Infectious Disease Progress Note    Patient is a followup for HIV  Serofast syphilis titer hx in HIV. No new issues with this. Has a PCP and follows regularly. She is HIV controlled. States that she had a cutaneous abscess that has now drained in the perineal area - self conscious and does not want me to look due to the fact that she is on her period. States that the abscessed area has now cleared. General: Patient appears in no acute distress, pleasant and cooperative  Skin: no new rashes or erythema or induration  HEENT: PERRL, EOMI, MMM, Dentition is fair, Neck is supple, No cervical adenopathy  Heart: S1 S2  Lungs: clear bilaterally to auscultation  Abdomen: soft, ND, NTTP, +BS  Extrem:+pulses, no calf pain, no asymmetry of the upper or lower extremities  Neuro exam: CN II-XII intact, facial expressions symmertrical bilaterally, no slurred speech, muscle strength equal bilaterally          Imaging and labs were reviewed per medical records and any ID pertinent labs were addressed with the patient. Lab Results   Component Value Date    WBC 5.3 07/07/2022    HGB 14.7 07/07/2022    HCT 43.5 07/07/2022    MCV 88.6 07/07/2022     07/07/2022       Assessment:  HIV/AIDS - Genvoya, 100% compliance. Controlled. Plan:  Compliance stressed   Continue Genvoya  RPR is reactive but her T pallidum confirmation is NEGATIVE so this is a serofast in HIV.     Time spent today in combination of reviewing patient's chart, medications, labs, pictures when pertinent, provider communication as necessary, counseling patient, care/coordination not otherwise reported here, and patient face to face virtual visit 30 min.   >50% of that time spent counseling and coordination of patient care  Patient was also appropriately counseled on preventive measures such as vaccinations, the importance of annual exam with their PCP, and the importance of health maintenance by dietary and exercise regimens. All questions were answered from an ID perspective and to the best of my ability.           Melissa Glover D.O.

## 2022-08-11 ENCOUNTER — TELEPHONE (OUTPATIENT)
Dept: INFECTIOUS DISEASES | Age: 47
End: 2022-08-11

## 2022-09-24 ENCOUNTER — APPOINTMENT (OUTPATIENT)
Dept: GENERAL RADIOLOGY | Age: 47
End: 2022-09-24
Payer: COMMERCIAL

## 2022-09-24 ENCOUNTER — HOSPITAL ENCOUNTER (EMERGENCY)
Age: 47
Discharge: HOME | End: 2022-09-24
Payer: COMMERCIAL

## 2022-09-24 VITALS
SYSTOLIC BLOOD PRESSURE: 107 MMHG | HEIGHT: 64 IN | HEART RATE: 71 BPM | OXYGEN SATURATION: 96 % | WEIGHT: 135 LBS | BODY MASS INDEX: 23.05 KG/M2 | TEMPERATURE: 98.1 F | DIASTOLIC BLOOD PRESSURE: 68 MMHG | RESPIRATION RATE: 16 BRPM

## 2022-09-24 DIAGNOSIS — M54.41 ACUTE RIGHT-SIDED LOW BACK PAIN WITH RIGHT-SIDED SCIATICA: Primary | ICD-10-CM

## 2022-09-24 PROCEDURE — 6370000000 HC RX 637 (ALT 250 FOR IP): Performed by: PHYSICIAN ASSISTANT

## 2022-09-24 PROCEDURE — 72110 X-RAY EXAM L-2 SPINE 4/>VWS: CPT

## 2022-09-24 PROCEDURE — 6360000002 HC RX W HCPCS: Performed by: PHYSICIAN ASSISTANT

## 2022-09-24 PROCEDURE — 99284 EMERGENCY DEPT VISIT MOD MDM: CPT

## 2022-09-24 PROCEDURE — 96372 THER/PROPH/DIAG INJ SC/IM: CPT

## 2022-09-24 RX ORDER — ORPHENADRINE CITRATE 30 MG/ML
60 INJECTION INTRAMUSCULAR; INTRAVENOUS ONCE
Status: COMPLETED | OUTPATIENT
Start: 2022-09-24 | End: 2022-09-24

## 2022-09-24 RX ORDER — CYCLOBENZAPRINE HCL 10 MG
10 TABLET ORAL 2 TIMES DAILY PRN
Qty: 10 TABLET | Refills: 0 | Status: SHIPPED | OUTPATIENT
Start: 2022-09-24 | End: 2022-10-04

## 2022-09-24 RX ORDER — METHYLPREDNISOLONE 4 MG/1
TABLET ORAL
Qty: 1 KIT | Refills: 0 | Status: SHIPPED | OUTPATIENT
Start: 2022-09-24 | End: 2022-09-30

## 2022-09-24 RX ORDER — TRAMADOL HYDROCHLORIDE 50 MG/1
50 TABLET ORAL ONCE
Status: COMPLETED | OUTPATIENT
Start: 2022-09-24 | End: 2022-09-24

## 2022-09-24 RX ORDER — KETOROLAC TROMETHAMINE 30 MG/ML
30 INJECTION, SOLUTION INTRAMUSCULAR; INTRAVENOUS ONCE
Status: COMPLETED | OUTPATIENT
Start: 2022-09-24 | End: 2022-09-24

## 2022-09-24 RX ADMIN — ORPHENADRINE CITRATE 60 MG: 30 INJECTION INTRAMUSCULAR; INTRAVENOUS at 08:15

## 2022-09-24 RX ADMIN — KETOROLAC TROMETHAMINE 30 MG: 30 INJECTION, SOLUTION INTRAMUSCULAR at 08:15

## 2022-09-24 RX ADMIN — TRAMADOL HYDROCHLORIDE 50 MG: 50 TABLET ORAL at 08:14

## 2022-09-24 ASSESSMENT — ENCOUNTER SYMPTOMS
COUGH: 0
RHINORRHEA: 0
VOMITING: 0
NAUSEA: 0
ABDOMINAL PAIN: 0
SORE THROAT: 0
EYE PAIN: 0
DIARRHEA: 0
SHORTNESS OF BREATH: 0
BACK PAIN: 1
PHOTOPHOBIA: 0

## 2022-09-24 ASSESSMENT — PAIN DESCRIPTION - DESCRIPTORS
DESCRIPTORS: DULL
DESCRIPTORS: ACHING;SHARP

## 2022-09-24 ASSESSMENT — PAIN - FUNCTIONAL ASSESSMENT: PAIN_FUNCTIONAL_ASSESSMENT: 0-10

## 2022-09-24 ASSESSMENT — PAIN SCALES - GENERAL
PAINLEVEL_OUTOF10: 10
PAINLEVEL_OUTOF10: 3
PAINLEVEL_OUTOF10: 10

## 2022-09-24 ASSESSMENT — PAIN DESCRIPTION - ORIENTATION
ORIENTATION: LOWER
ORIENTATION: RIGHT

## 2022-09-24 ASSESSMENT — PAIN DESCRIPTION - LOCATION
LOCATION: BACK

## 2022-09-24 NOTE — ED PROVIDER NOTES
[facility] ED  EMERGENCY DEPARTMENT ENCOUNTER      Pt Name: Iván Galeas  MRN: [id number]  [place] 1975  Date of evaluation: 9/24/2022  Provider: Adonay Carmona PA-C      HISTORY OF PRESENT ILLNESS    Iván Galeas is a 40 y.o. female who presents to the Emergency Department with right-sided low back pain x2 days that worsened today. When she walks the pain will radiate down her right leg. She denies any history of back problems. She does work at a labor-intensive job. But she denies any inciting injury. She tried 1 Advil yesterday. She is not had any medication today. Patient states that she went to work and her back hurt too bad so they told her to go to the emergency department. She denies any numbness tingling weakness incontinence of bowel bladder saddle paresthesias. Denies any urinary urinary symptoms. Denies dysuria hematuria changes in frequency or urgency. She denies any fevers. REVIEW OF SYSTEMS       Review of Systems   Constitutional:  Negative for chills, diaphoresis, fatigue and fever. HENT:  Negative for congestion, rhinorrhea and sore throat. Eyes:  Negative for photophobia and pain. Respiratory:  Negative for cough and shortness of breath. Cardiovascular:  Negative for chest pain and palpitations. Gastrointestinal:  Negative for abdominal pain, diarrhea, nausea and vomiting. Genitourinary:  Negative for dysuria and flank pain. Musculoskeletal:  Positive for back pain. Skin:  Negative for rash. Neurological:  Negative for dizziness, light-headedness and headaches. Psychiatric/Behavioral: Negative. All other systems reviewed and are negative.       PAST MEDICAL HISTORY     Past Medical History:   Diagnosis Date    Abnormal Pap smear of cervix     Behavior disturbance     Breast disorder     Encephalopathy 2/10/2019    HIV (human immunodeficiency virus infection) ([facility] [address] 2/25/2019    Infection due to urethral catheter ([facility]     Iron deficiency anemia secondary to inadequate dietary iron intake 2/4/2156    Metabolic encephalopathy 5/69/1165    Moderate malnutrition (HCC) 2/18/2019    Noncompliance     Obstructed fallopian tubes 10/31/2016    Oligoclonal bands in cerebrospinal fluid 7/1/2019    Seasonal allergic rhinitis 12/9/2021    Tinea pedis of both feet 12/9/2021         SURGICAL HISTORY       Past Surgical History:   Procedure Laterality Date    CERVICAL CERCLAGE  10 yrs ago    3 pregnancies & 3 cerclages    COLONOSCOPY N/A 2/4/2022    COLORECTAL CANCER SCREENING, NOT HIGH RISK performed by Cristina Navarro MD at [address] 11/14/2016    HYSTEROSCOPY OPERATIVE  LAPAROSCOPY, Sinhala SPEAKING  performed by Lisette Petersen DO at [address] [facility] Bilateral          CURRENT MEDICATIONS       Discharge Medication List as of 9/24/2022  9:14 AM        CONTINUE these medications which have NOT CHANGED    Details   citalopram (CELEXA) 20 MG tablet Take 1 tablet by mouth daily, Disp-30 tablet, R-5Normal      GENVOYA [id number] MG TABLET Take 1 tablet by mouth daily, Disp-30 tablet, R-5, DAWNormal      terbinafine (LAMISIL) 1 % cream Apply topically 2 times daily. , Disp-42 g, R-5, Normal      Cholecalciferol (VITAMIN D3) 50 MCG (2000 UT) CAPS 1 po daily with meal, Disp-30 capsule, R-12Normal             ALLERGIES     Aspirin and Sulfa antibiotics    FAMILY HISTORY       Family History   Problem Relation Age of Onset    High Blood Pressure Mother     Stroke Mother     Diabetes Mother     No Known Problems Father     Colon Cancer Neg Hx           SOCIAL HISTORY       Social History     Socioeconomic History    Marital status: Single     Spouse name: None    Number of children: None    Years of education: None    Highest education level: None   Tobacco Use    Smoking status: Never    Smokeless tobacco: Never   Vaping Use    Vaping Use: Never used   Substance and Sexual Activity    Alcohol use: No     Comment: occasional    Drug use: No     Social Determinants of Health     Financial Resource Strain: Low Risk     Difficulty of Paying Living Expenses: Not hard at all   Food Insecurity: No Food Insecurity    Worried About Running Out of Food in the Last Year: Never true    Ran Out of Food in the Last Year: Never true   Transportation Needs: No Transportation Needs    Lack of Transportation (Medical): No    Lack of Transportation (Non-Medical): No       SCREENINGS    Denio Coma Scale  Eye Opening: Spontaneous  Best Verbal Response: Oriented  Best Motor Response: Obeys commands  Erich Coma Scale Score: 15        PHYSICAL EXAM    (up to 7 for level 4, 8 or more for level 5)     ED Triage Vitals   BP Temp Temp Source Heart Rate Resp SpO2 Height Weight   09/24/22 0739 09/24/22 0738 09/24/22 0738 09/24/22 0738 09/24/22 0738 09/24/22 0738 09/24/22 0738 09/24/22 0738   107/68 98.1 °F (36.7 °C) Oral 71 16 96 % 5' 4\" (1.626 m) 135 lb (61.2 kg)       Physical Exam  Vitals and nursing note reviewed. Constitutional:       General: She is not in acute distress. Appearance: Normal appearance. She is well-developed. She is not diaphoretic. HENT:      Head: Normocephalic and atraumatic. Nose: Nose normal.      Mouth/Throat:      Mouth: Mucous membranes are moist.   Eyes:      General: Lids are normal.      Conjunctiva/sclera: Conjunctivae normal.   Cardiovascular:      Rate and Rhythm: Normal rate and regular rhythm. Pulses: Normal pulses. Heart sounds: Normal heart sounds. Pulmonary:      Effort: Pulmonary effort is normal.      Breath sounds: Normal breath sounds. Abdominal:      General: Bowel sounds are normal.      Palpations: Abdomen is soft. Tenderness: There is no abdominal tenderness. Musculoskeletal:         General: Normal range of motion. Cervical back: Normal range of motion and neck supple. Lumbar back: Tenderness present. Comments: Si joint ttp.  B/l equal strength. 2+ distal pulse. Trade. Ambulates independently    Lymphadenopathy:      Cervical: No cervical adenopathy. Skin:     General: Skin is warm and dry. Capillary Refill: Capillary refill takes less than 2 seconds. Findings: No rash. Neurological:      Mental Status: She is alert and oriented to person, place, and time. Psychiatric:         Thought Content: Thought content normal.         Judgment: Judgment normal.         All other labs were within normal range or not returned as of this dictation. EMERGENCY DEPARTMENT COURSE and DIFFERENTIALDIAGNOSIS/MDM:   Vitals:    Vitals:    09/24/22 0738 09/24/22 0739 09/24/22 0814   BP:  107/68    Pulse: 71     Resp: 16  16   Temp: 98.1 °F (36.7 °C)     TempSrc: Oral     SpO2: 96%     Weight: 135 lb (61.2 kg)     Height: 5' 4\" (1.626 m)              Medicated in ed for pain and feeling significantly better. Xray negative. Given hx and exam, suspect likely musculoskeletal etiology. Low suspicion for acute cord compression or cauda equina at this time, given presentation and symptoms, including epidural abscess or hematoma. Patient has no history of malignancy, active or distant history. Patient has no unexplained weight loss. No recent fevers, rigors, malaise or recent infection. No history of IV drug use. Patient does not have any history concerning for subtle anesthesia or sensory also complaining of decreased rectal tone. Patient does not have urinary retention or inability to control urine from overflow. Patient has no tenderness overlying spinous process. Patient has no focal weakness on examination. No peritoneal signs or abdominal tenderness on exam concerning for AAA. Given exam and history, low suspicion for cord compression, cauda equina, epidural abscess hematoma. Distally neurovascularly intact. Likely musculoskeletal.  Discussed pain control, physical therapy and follow up with primary care doctor.   Cautious return precautions discussed with full understanding. PROCEDURES:  Unless otherwise noted below, none     Procedures      FINAL IMPRESSION      1.  Acute right-sided low back pain with right-sided sciatica          DISPOSITION/PLAN   DISPOSITION Decision To Discharge 09/24/2022 08:58:05 AM          Chip Jaime PA-C (electronically signed)  Attending Emergency Physician       Chip Jaime PA-C  09/24/22 2089

## 2022-09-24 NOTE — ED NOTES
Patient reports improvement on pain. States that she needs a work excuse.       Rey Marrero RN  09/24/22 0015

## 2022-09-24 NOTE — Clinical Note
Kyle Bravojason was seen and treated in our emergency department on 9/24/2022. She may return to work on 09/26/2022. If you have any questions or concerns, please don't hesitate to call.       Todd Stoner

## 2022-09-24 NOTE — ED TRIAGE NOTES
Patient presents with complaints of low back pain that radiates down her right leg since yesterday, denies injury. Patient in no distress on arrival, ambulatory with steady gait.

## 2022-09-24 NOTE — ED NOTES
Patient denies any chance of pregnancy. States no sexual activity since last menstrual cycle. Patient medicated as ordered. Patient ambulatory to radiology for ordered x-ray.      Pat Oliveira RN  09/24/22 8660

## 2022-09-28 ENCOUNTER — TELEPHONE (OUTPATIENT)
Dept: INFECTIOUS DISEASES | Age: 47
End: 2022-09-28

## 2022-09-28 NOTE — TELEPHONE ENCOUNTER
Pt called, informed CM she received a letter that PCP is leaving practice, requesting referral for new PCP. CM provided referral to Ricarda Fry DO.   CM will provide further assistance as needed or per request.

## 2022-11-07 DIAGNOSIS — B20 HIV INFECTION, UNSPECIFIED SYMPTOM STATUS (HCC): ICD-10-CM

## 2022-11-07 RX ORDER — ELVITEGRAVIR, COBICISTAT, EMTRICITABINE, AND TENOFOVIR ALAFENAMIDE 150; 150; 200; 10 MG/1; MG/1; MG/1; MG/1
1 TABLET ORAL DAILY
Qty: 30 TABLET | Refills: 0 | Status: SHIPPED | OUTPATIENT
Start: 2022-11-07

## 2022-12-01 ENCOUNTER — TELEPHONE (OUTPATIENT)
Dept: INFECTIOUS DISEASES | Age: 47
End: 2022-12-01

## 2022-12-01 DIAGNOSIS — B20 HIV INFECTION, UNSPECIFIED SYMPTOM STATUS (HCC): Primary | ICD-10-CM

## 2022-12-01 NOTE — TELEPHONE ENCOUNTER
Per request, Cm scheduled transport for the following appointments:    12/15/22 @ 8:30AM- PCP    12/20/22 @ 12PM- ID    CM assessed need, arrangements as last resort.

## 2022-12-02 DIAGNOSIS — B20 HIV INFECTION, UNSPECIFIED SYMPTOM STATUS (HCC): ICD-10-CM

## 2022-12-02 RX ORDER — ELVITEGRAVIR, COBICISTAT, EMTRICITABINE, AND TENOFOVIR ALAFENAMIDE 150; 150; 200; 10 MG/1; MG/1; MG/1; MG/1
TABLET ORAL
Qty: 30 TABLET | Refills: 0 | Status: SHIPPED | OUTPATIENT
Start: 2022-12-02

## 2022-12-15 ENCOUNTER — OFFICE VISIT (OUTPATIENT)
Dept: FAMILY MEDICINE CLINIC | Age: 47
End: 2022-12-15
Payer: COMMERCIAL

## 2022-12-15 VITALS
DIASTOLIC BLOOD PRESSURE: 62 MMHG | SYSTOLIC BLOOD PRESSURE: 126 MMHG | HEART RATE: 89 BPM | HEIGHT: 64 IN | TEMPERATURE: 97.3 F | BODY MASS INDEX: 22.88 KG/M2 | OXYGEN SATURATION: 98 % | WEIGHT: 134 LBS

## 2022-12-15 DIAGNOSIS — J02.9 SORE THROAT: ICD-10-CM

## 2022-12-15 DIAGNOSIS — F41.9 ANXIETY: Primary | Chronic | ICD-10-CM

## 2022-12-15 DIAGNOSIS — B35.3 TINEA PEDIS OF LEFT FOOT: ICD-10-CM

## 2022-12-15 DIAGNOSIS — Z23 IMMUNIZATION DUE: ICD-10-CM

## 2022-12-15 DIAGNOSIS — F32.5 MAJOR DEPRESSIVE DISORDER WITH SINGLE EPISODE, IN FULL REMISSION (HCC): ICD-10-CM

## 2022-12-15 DIAGNOSIS — B20 HIV INFECTION, UNSPECIFIED SYMPTOM STATUS (HCC): ICD-10-CM

## 2022-12-15 DIAGNOSIS — J06.9 URI WITH COUGH AND CONGESTION: ICD-10-CM

## 2022-12-15 LAB
ALBUMIN SERPL-MCNC: 4.5 G/DL (ref 3.5–4.6)
ALP BLD-CCNC: 56 U/L (ref 40–130)
ALT SERPL-CCNC: 15 U/L (ref 0–33)
ANION GAP SERPL CALCULATED.3IONS-SCNC: 14 MEQ/L (ref 9–15)
AST SERPL-CCNC: 23 U/L (ref 0–35)
BILIRUB SERPL-MCNC: 0.6 MG/DL (ref 0.2–0.7)
BUN BLDV-MCNC: 8 MG/DL (ref 6–20)
CALCIUM SERPL-MCNC: 10 MG/DL (ref 8.5–9.9)
CHLORIDE BLD-SCNC: 102 MEQ/L (ref 95–107)
CO2: 24 MEQ/L (ref 20–31)
CREAT SERPL-MCNC: 0.64 MG/DL (ref 0.5–0.9)
GFR SERPL CREATININE-BSD FRML MDRD: >60 ML/MIN/{1.73_M2}
GLOBULIN: 3 G/DL (ref 2.3–3.5)
GLUCOSE BLD-MCNC: 82 MG/DL (ref 70–99)
HCT VFR BLD CALC: 41.3 % (ref 37–47)
HEMOGLOBIN: 13.6 G/DL (ref 12–16)
INFLUENZA A ANTIBODY: NORMAL
INFLUENZA B ANTIBODY: NORMAL
Lab: NORMAL
MCH RBC QN AUTO: 29.7 PG (ref 27–31.3)
MCHC RBC AUTO-ENTMCNC: 33 % (ref 33–37)
MCV RBC AUTO: 90.1 FL (ref 79.4–94.8)
PDW BLD-RTO: 13.2 % (ref 11.5–14.5)
PERFORMING INSTRUMENT: NORMAL
PLATELET # BLD: 211 K/UL (ref 130–400)
POTASSIUM SERPL-SCNC: 4.7 MEQ/L (ref 3.4–4.9)
QC PASS/FAIL: NORMAL
RBC # BLD: 4.58 M/UL (ref 4.2–5.4)
SARS-COV-2, POC: NORMAL
SODIUM BLD-SCNC: 140 MEQ/L (ref 135–144)
TOTAL PROTEIN: 7.5 G/DL (ref 6.3–8)
WBC # BLD: 4.2 K/UL (ref 4.8–10.8)

## 2022-12-15 PROCEDURE — G8482 FLU IMMUNIZE ORDER/ADMIN: HCPCS | Performed by: NURSE PRACTITIONER

## 2022-12-15 PROCEDURE — G8420 CALC BMI NORM PARAMETERS: HCPCS | Performed by: NURSE PRACTITIONER

## 2022-12-15 PROCEDURE — 99214 OFFICE O/P EST MOD 30 MIN: CPT | Performed by: NURSE PRACTITIONER

## 2022-12-15 PROCEDURE — 1036F TOBACCO NON-USER: CPT | Performed by: NURSE PRACTITIONER

## 2022-12-15 PROCEDURE — 90674 CCIIV4 VAC NO PRSV 0.5 ML IM: CPT | Performed by: NURSE PRACTITIONER

## 2022-12-15 PROCEDURE — 87426 SARSCOV CORONAVIRUS AG IA: CPT | Performed by: NURSE PRACTITIONER

## 2022-12-15 PROCEDURE — 87804 INFLUENZA ASSAY W/OPTIC: CPT | Performed by: NURSE PRACTITIONER

## 2022-12-15 PROCEDURE — G8427 DOCREV CUR MEDS BY ELIG CLIN: HCPCS | Performed by: NURSE PRACTITIONER

## 2022-12-15 PROCEDURE — 90471 IMMUNIZATION ADMIN: CPT | Performed by: NURSE PRACTITIONER

## 2022-12-15 RX ORDER — CITALOPRAM 20 MG/1
20 TABLET ORAL DAILY
Qty: 90 TABLET | Refills: 3 | Status: SHIPPED | OUTPATIENT
Start: 2022-12-15

## 2022-12-15 RX ORDER — PRENATAL VIT 91/IRON/FOLIC/DHA 28-975-200
COMBINATION PACKAGE (EA) ORAL
Qty: 42 G | Refills: 5 | Status: SHIPPED | OUTPATIENT
Start: 2022-12-15

## 2022-12-15 ASSESSMENT — ANXIETY QUESTIONNAIRES
IF YOU CHECKED OFF ANY PROBLEMS ON THIS QUESTIONNAIRE, HOW DIFFICULT HAVE THESE PROBLEMS MADE IT FOR YOU TO DO YOUR WORK, TAKE CARE OF THINGS AT HOME, OR GET ALONG WITH OTHER PEOPLE: NOT DIFFICULT AT ALL
2. NOT BEING ABLE TO STOP OR CONTROL WORRYING: 1
4. TROUBLE RELAXING: 1
1. FEELING NERVOUS, ANXIOUS, OR ON EDGE: 1
6. BECOMING EASILY ANNOYED OR IRRITABLE: 2
GAD7 TOTAL SCORE: 7
5. BEING SO RESTLESS THAT IT IS HARD TO SIT STILL: 1
3. WORRYING TOO MUCH ABOUT DIFFERENT THINGS: 1
7. FEELING AFRAID AS IF SOMETHING AWFUL MIGHT HAPPEN: 0

## 2022-12-15 ASSESSMENT — ENCOUNTER SYMPTOMS
COUGH: 0
SHORTNESS OF BREATH: 0
BLOOD IN STOOL: 0
SORE THROAT: 1
RESPIRATORY NEGATIVE: 1
WHEEZING: 0

## 2022-12-15 NOTE — PROGRESS NOTES
Vaccine Information Sheet, \"Influenza - Inactivated\"  given to [employer], or parent/legal guardian of  [employer] and verbalized understanding. Patient responses:    Have you ever had a reaction to a flu vaccine? No  Are you able to eat eggs without adverse effects? Yes  Do you have any current illness? No  Have you ever had Guillian [place] Syndrome? No    Flu vaccine given per order. Please see immunization tab.

## 2022-12-15 NOTE — PROGRESS NOTES
[facility]  [address] [address]  Dept: [phone]  Dept Fax: [phone]  Loc: Eddie Winchester (: 1975) is a 56 y.o. female, Established patient, here for evaluation of the following chief complaint(s):  New Patient, Muscle Pain (On chest area. Due to constant movement at work), Back Pain, and Other (Service dog signed letter)      PCP:  JESU Mims CNP      Pharyngitis  This is a recurrent problem. The current episode started in the past 7 days. The problem occurs constantly. The problem has been unchanged. Associated symptoms include a sore throat. Pertinent negatives include no chest pain, coughing, fatigue or fever. Nothing aggravates the symptoms. She has tried nothing for the symptoms. . Follow up for Mental Health:    Time of diagnosis: 2019  Psychology or FM managed: Family Medicine  Counseling: No  Depression: Yes  Anxiety: Yes  Sleeping pattern: normal  Mood swings: No    Generally feeling happy: Yes  Medication adherence: yes takes medication as prescribed  Side effects: None  Suicidal Thoughts: None  Most recent MARIAELENA score:   MARIAELENA-7 SCREENING 12/15/2022   Feeling nervous, anxious, or on edge Several days   Not being able to stop or control worrying Several days   Worrying too much about different things Several days   Trouble relaxing Several days   Being so restless that it is hard to sit still Several days   Becoming easily annoyed or irritable More than half the days   Feeling afraid as if something awful might happen Not at all   MARIAELENA-7 Total Score 7   How difficult have these problems made it for you to do your work, take care of things at home, or get along with other people?  Not difficult at all      Most recent PHQ9 score:   PHQ-9  2022   Little interest or pleasure in doing things 1   Feeling down, depressed, or hopeless 1   Trouble falling or staying asleep, or sleeping too much 0   Feeling tired or having little energy 0   Poor appetite or overeating 0   Feeling bad about yourself - or that you are a failure or have let yourself or your family down 0   Trouble concentrating on things, such as reading the newspaper or watching television 0   Moving or speaking so slowly that other people could have noticed. Or the opposite - being so fidgety or restless that you have been moving around a lot more than usual 0   Thoughts that you would be better off dead, or of hurting yourself in some way 0   PHQ-2 Score 2   PHQ-9 Total Score 2   If you checked off any problems, how difficult have these problems made it for you to do your work, take care of things at home, or get along with other people? 0      Chronic athletes foot of left extremely. States she would like Lamisil refilled. OARRS reviewed with no concerns  Urine drug screen complete:   Drug Screen Most Recent Result Date    No resulted procedures found. Medication agreement utd: No data recorded       Review of Systems   Constitutional: Negative. Negative for fatigue and fever. HENT:  Positive for sore throat. Respiratory: Negative. Negative for cough, shortness of breath and wheezing. Cardiovascular: Negative. Negative for chest pain, palpitations and leg swelling. Gastrointestinal:  Negative for blood in stool. Psychiatric/Behavioral: Negative. Negative for behavioral problems. The patient is not nervous/anxious.       Past Medical History:   Diagnosis Date    Abnormal Pap smear of cervix     Behavior disturbance     Breast disorder     Encephalopathy 2/10/2019    HIV (human immunodeficiency virus infection) ([facility] 2/25/2019    [facility]     Iron deficiency anemia secondary to inadequate dietary iron intake 0/5/9413    Metabolic encephalopathy 4/47/5580    Moderate malnutrition ([facility] [address] 2/18/2019 Noncompliance     Obstructed fallopian tubes 10/31/2016    Oligoclonal bands in cerebrospinal fluid 7/1/2019    Seasonal allergic rhinitis 12/9/2021    Tinea pedis of both feet 12/9/2021     Past Surgical History:   Procedure Laterality Date    CERVICAL CERCLAGE  10 yrs ago    3 pregnancies & 3 cerclages    COLONOSCOPY N/A 2/4/2022    COLORECTAL CANCER SCREENING, NOT HIGH RISK performed by Pasquale Singer MD at [address] N/A 11/14/2016    HYSTEROSCOPY OPERATIVE  LAPAROSCOPY, Hebrew SPEAKING  performed by Trey Mccullough DO at [address] [facility] Bilateral      Social History     Socioeconomic History    Marital status: Single     Spouse name: Not on file    Number of children: Not on file    Years of education: Not on file    Highest education level: Not on file   Occupational History    Not on file   Tobacco Use    Smoking status: Never    Smokeless tobacco: Never   Vaping Use    Vaping Use: Never used   Substance and Sexual Activity    Alcohol use: No     Comment: occasional    Drug use: No    Sexual activity: Not on file   Other Topics Concern    Not on file   Social History Narrative    Not on file     Social Determinants of Health     Financial Resource Strain: Low Risk     Difficulty of Paying Living Expenses: Not hard at all   Food Insecurity: No Food Insecurity    Worried About Running Out of Food in the Last Year: Never true    [address] in the Last Year: Never true   Transportation Needs: No Transportation Needs    Lack of Transportation (Medical): No    Lack of Transportation (Non-Medical):  No   Physical Activity: Not on file   Stress: Not on file   Social Connections: Not on file   Intimate Partner Violence: Not on file   Housing Stability: Not on file     Family History   Problem Relation Age of Onset    High Blood Pressure Mother     Stroke Mother     Diabetes Mother     No Known Problems Father     Colon Cancer Neg Hx       Allergies   Allergen Reactions    Aspirin Itching    Sulfa Antibiotics Swelling     Prior to Admission medications    Medication Sig Start Date End Date Taking? Authorizing Provider   citalopram (CELEXA) 20 MG tablet Take 1 tablet by mouth daily 12/15/22  Yes JESU Carcamo CNP   terbinafine (LAMISIL) 1 % cream Apply topically 2 times daily. 12/15/22  Yes JESU Carcamo CNP   GENVOYA [id number] MG TABLET TAKE 1 TABLET BY MOUTH EVERY DAY 12/2/22  Yes Melissa Glover DO   Cholecalciferol (VITAMIN D3) 50 MCG (2000 UT) CAPS 1 po daily with meal 12/9/21  Yes Dave Alvarado MD                I have reviewed the patient's medical history in detail and updated the computerized patient record. OBJECTIVE    Vitals:    12/15/22 0858   BP: 126/62   Pulse: 89   Temp: 97.3 °F (36.3 °C)   TempSrc: Temporal   SpO2: 98%   Weight: 134 lb (60.8 kg)   Height: 5' 4\" (1.626 m)       Physical Exam  Constitutional:       Appearance: Normal appearance. HENT:      Head: Normocephalic. Cardiovascular:      Rate and Rhythm: Regular rhythm. Heart sounds: No murmur heard. Pulmonary:      Effort: Pulmonary effort is normal. No respiratory distress. Breath sounds: Normal breath sounds. No wheezing. Skin:     General: Skin is warm and dry. Neurological:      General: No focal deficit present. Mental Status: She is alert and oriented to person, place, and time. ASSESSMENT/ PLAN    1. Anxiety  Stable. Medication refilled. MARIAELENA score:7    No suicidal thoughts. Patient is to not abruptly stop medication. - citalopram (CELEXA) 20 MG tablet; Take 1 tablet by mouth daily  Dispense: 90 tablet; Refill: 3    2. Need for influenza vaccination  Flu shot in office.   - Influenza, FLUCELVAX, (age 10 mo+), IM, Preservative Free, 0.5 mL    4. Major depressive disorder with single episode, in full remission ([facility] [address]  Stable on celexa. - citalopram (CELEXA) 20 MG tablet;  Take 1 tablet by mouth daily Dispense: 90 tablet; Refill: 3    5. Sore throat  Covid and flu negative    6. Tinea pedis of left foot  Lamisil refilled for patient. On this date 12/15/2022 I have spent 40 minutes reviewing previous notes, test results and face to face with the patient discussing the diagnosis and importance of compliance with the treatment plan as well as documenting on the day of the visit. No follow-ups on file.      Electronically signed by:  JESU Mims CNP   12/15/22

## 2022-12-16 LAB
ABSOLUTE CD 4 HELPER: 509 CELLS/UL (ref 430–1800)
CD4 % HELPER T CELL: 36 % (ref 32–64)
HIV QUANT LOG: 1.41 LOG CPY/ML
HIV-1 RNA BY PCR, QN: 25.6 CPY/ML
HIV-1 RNA BY PCR, QN: DETECTED
LYMPHOCYTE SUBSET PANEL 2 INFO: NORMAL
SOURCE: ABNORMAL

## 2022-12-20 ENCOUNTER — OFFICE VISIT (OUTPATIENT)
Dept: INFECTIOUS DISEASES | Age: 47
End: 2022-12-20

## 2022-12-20 VITALS
DIASTOLIC BLOOD PRESSURE: 67 MMHG | WEIGHT: 137 LBS | HEART RATE: 85 BPM | BODY MASS INDEX: 23.52 KG/M2 | SYSTOLIC BLOOD PRESSURE: 102 MMHG | TEMPERATURE: 97.5 F

## 2022-12-20 DIAGNOSIS — B20 HUMAN IMMUNODEFICIENCY VIRUS (HCC): Primary | ICD-10-CM

## 2022-12-20 ASSESSMENT — PATIENT HEALTH QUESTIONNAIRE - PHQ9
2. FEELING DOWN, DEPRESSED OR HOPELESS: 0
SUM OF ALL RESPONSES TO PHQ QUESTIONS 1-9: 0
SUM OF ALL RESPONSES TO PHQ QUESTIONS 1-9: 0
SUM OF ALL RESPONSES TO PHQ9 QUESTIONS 1 & 2: 0
SUM OF ALL RESPONSES TO PHQ QUESTIONS 1-9: 0
SUM OF ALL RESPONSES TO PHQ QUESTIONS 1-9: 0
1. LITTLE INTEREST OR PLEASURE IN DOING THINGS: 0

## 2022-12-20 NOTE — PROGRESS NOTES
Con Garíca  1975  female  Medical Record Number: [id number]      Infectious Disease Progress Note    Patient is a followup for HIV  Serofast syphilis titer hx in HIV. No new issues with this. Has a PCP and follows regularly. She is HIV controlled. No complaints    General: Patient appears in no acute distress, pleasant and cooperative  Skin: no new rashes or erythema or induration  HEENT: PERRL, EOMI, MMM, Dentition is fair, Neck is supple, No cervical adenopathy  Heart: S1 S2  Lungs: clear bilaterally to auscultation  Abdomen: soft, ND, NTTP, +BS  Extrem:+pulses, no calf pain, no asymmetry of the upper or lower extremities  Neuro exam: CN II-XII intact, facial expressions symmertrical bilaterally, no slurred speech, muscle strength equal bilaterally      Labs reviewed from 12/2022    Imaging and labs were reviewed per medical records and any ID pertinent labs were addressed with the patient. Lab Results   Component Value Date    WBC 4.2 (L) 12/15/2022    HGB 13.6 12/15/2022    HCT 41.3 12/15/2022    MCV 90.1 12/15/2022     12/15/2022       Assessment:  HIV/AIDS - Genvoya, 100% compliance. Controlled. Plan:  Compliance stressed - low level detectable viral load. CD4 ok. Had flu when she had viral load detected  Continue Genvoya  RPR is reactive but her T pallidum confirmation is NEGATIVE so this is a serofast in HIV. Time spent today in combination of reviewing patient's chart, medications, labs, pictures when pertinent, provider communication as necessary, counseling patient, care/coordination not otherwise reported here, and patient face to face virtual visit 30 min.   >50% of that time spent counseling and coordination of patient care  Patient was also appropriately counseled on preventive measures such as vaccinations, the importance of annual exam with their PCP, and the importance of health maintenance by dietary and exercise regimens.  All questions were answered from an ID perspective and to the best of my ability.           Melissa Glover D.O.

## 2022-12-20 NOTE — PROGRESS NOTES
Pt here for routine B20 appointment. Doing well. Compliant with medications? yes    Flu vaccine? Scheduled for next week    Pneumonia vacc? Does not want    Smoking? no    Etoh? no    Drugs? marij     PCP? yes    Working? yes    Housing? stable    Dental? encouraged    OBGYN: encouraged     Eligibility date:  [employer] policy date:       U=U and mental health were both discussed at today's appointment. Pt denies any issues with mental health, and verbalizes understanding of U=U. Pt to contact office with any questions or concerns. Phone number to office provided at checkout.

## 2023-01-01 DIAGNOSIS — B20 HIV INFECTION, UNSPECIFIED SYMPTOM STATUS (HCC): ICD-10-CM

## 2023-01-03 RX ORDER — ELVITEGRAVIR, COBICISTAT, EMTRICITABINE, AND TENOFOVIR ALAFENAMIDE 150; 150; 200; 10 MG/1; MG/1; MG/1; MG/1
TABLET ORAL
Qty: 30 TABLET | Refills: 0 | Status: SHIPPED | OUTPATIENT
Start: 2023-01-03

## 2023-01-22 ENCOUNTER — APPOINTMENT (OUTPATIENT)
Dept: CT IMAGING | Age: 48
End: 2023-01-22
Payer: COMMERCIAL

## 2023-01-22 ENCOUNTER — HOSPITAL ENCOUNTER (EMERGENCY)
Age: 48
Discharge: HOME | End: 2023-01-22
Attending: FAMILY MEDICINE
Payer: COMMERCIAL

## 2023-01-22 VITALS
OXYGEN SATURATION: 100 % | DIASTOLIC BLOOD PRESSURE: 89 MMHG | TEMPERATURE: 98.7 F | BODY MASS INDEX: 23.52 KG/M2 | RESPIRATION RATE: 12 BRPM | SYSTOLIC BLOOD PRESSURE: 99 MMHG | WEIGHT: 137 LBS | HEART RATE: 63 BPM

## 2023-01-22 DIAGNOSIS — N39.0 ACUTE UTI: ICD-10-CM

## 2023-01-22 DIAGNOSIS — R10.9 ABDOMINAL CRAMPS: Primary | ICD-10-CM

## 2023-01-22 LAB
ALBUMIN SERPL-MCNC: 4.6 G/DL (ref 3.5–4.6)
ALP BLD-CCNC: 50 U/L (ref 40–130)
ALT SERPL-CCNC: 14 U/L (ref 0–33)
ANION GAP SERPL CALCULATED.3IONS-SCNC: 9 MEQ/L (ref 9–15)
AST SERPL-CCNC: 19 U/L (ref 0–35)
BACTERIA: ABNORMAL /HPF
BASOPHILS ABSOLUTE: 0 K/UL (ref 0–0.2)
BASOPHILS RELATIVE PERCENT: 0.8 %
BILIRUB SERPL-MCNC: 0.6 MG/DL (ref 0.2–0.7)
BILIRUBIN URINE: NEGATIVE
BLOOD, URINE: NEGATIVE
BUN BLDV-MCNC: 11 MG/DL (ref 6–20)
CALCIUM SERPL-MCNC: 10 MG/DL (ref 8.5–9.9)
CHLORIDE BLD-SCNC: 103 MEQ/L (ref 95–107)
CLARITY: ABNORMAL
CO2: 26 MEQ/L (ref 20–31)
COLOR: YELLOW
CREAT SERPL-MCNC: 0.68 MG/DL (ref 0.5–0.9)
EOSINOPHILS ABSOLUTE: 0.1 K/UL (ref 0–0.7)
EOSINOPHILS RELATIVE PERCENT: 1.7 %
EPITHELIAL CELLS, UA: >100 /HPF (ref 0–5)
GFR SERPL CREATININE-BSD FRML MDRD: >60 ML/MIN/{1.73_M2}
GFR SERPL CREATININE-BSD FRML MDRD: >60 ML/MIN/{1.73_M2}
GLOBULIN: 3 G/DL (ref 2.3–3.5)
GLUCOSE BLD-MCNC: 89 MG/DL (ref 70–99)
GLUCOSE URINE: NEGATIVE MG/DL
HCG(URINE) PREGNANCY TEST: NEGATIVE
HCT VFR BLD CALC: 43.4 % (ref 37–47)
HEMOGLOBIN: 14.3 G/DL (ref 12–16)
HYALINE CASTS: ABNORMAL /HPF (ref 0–5)
KETONES, URINE: NEGATIVE MG/DL
LACTIC ACID: 1.9 MMOL/L (ref 0.5–2.2)
LEUKOCYTE ESTERASE, URINE: ABNORMAL
LIPASE: 35 U/L (ref 12–95)
LYMPHOCYTES ABSOLUTE: 1.2 K/UL (ref 1–4.8)
LYMPHOCYTES RELATIVE PERCENT: 20.6 %
MCH RBC QN AUTO: 29.3 PG (ref 27–31.3)
MCHC RBC AUTO-ENTMCNC: 32.9 % (ref 33–37)
MCV RBC AUTO: 89.2 FL (ref 79.4–94.8)
MONOCYTES ABSOLUTE: 0.5 K/UL (ref 0.2–0.8)
MONOCYTES RELATIVE PERCENT: 8.1 %
NEUTROPHILS ABSOLUTE: 3.9 K/UL (ref 1.4–6.5)
NEUTROPHILS RELATIVE PERCENT: 68.8 %
NITRITE, URINE: NEGATIVE
PDW BLD-RTO: 12.9 % (ref 11.5–14.5)
PERFORMED ON: NORMAL
PH UA: 6 (ref 5–9)
PLATELET # BLD: 206 K/UL (ref 130–400)
POC CREATININE: 0.7 MG/DL (ref 0.6–1.2)
POC SAMPLE TYPE: NORMAL
POTASSIUM REFLEX MAGNESIUM: 4.5 MEQ/L (ref 3.4–4.9)
PROTEIN UA: NEGATIVE MG/DL
RBC # BLD: 4.86 M/UL (ref 4.2–5.4)
RBC UA: ABNORMAL /HPF (ref 0–2)
SODIUM BLD-SCNC: 138 MEQ/L (ref 135–144)
SPECIFIC GRAVITY UA: 1.02 (ref 1–1.03)
TOTAL PROTEIN: 7.6 G/DL (ref 6.3–8)
URINE REFLEX TO CULTURE: ABNORMAL
UROBILINOGEN, URINE: 0.2 E.U./DL
WBC # BLD: 5.6 K/UL (ref 4.8–10.8)
WBC UA: ABNORMAL /HPF (ref 0–5)

## 2023-01-22 PROCEDURE — 83690 ASSAY OF LIPASE: CPT

## 2023-01-22 PROCEDURE — 84703 CHORIONIC GONADOTROPIN ASSAY: CPT

## 2023-01-22 PROCEDURE — 83605 ASSAY OF LACTIC ACID: CPT

## 2023-01-22 PROCEDURE — 85025 COMPLETE CBC W/AUTO DIFF WBC: CPT

## 2023-01-22 PROCEDURE — 80053 COMPREHEN METABOLIC PANEL: CPT

## 2023-01-22 PROCEDURE — 81001 URINALYSIS AUTO W/SCOPE: CPT

## 2023-01-22 PROCEDURE — 99285 EMERGENCY DEPT VISIT HI MDM: CPT

## 2023-01-22 PROCEDURE — 74177 CT ABD & PELVIS W/CONTRAST: CPT

## 2023-01-22 PROCEDURE — 6360000004 HC RX CONTRAST MEDICATION: Performed by: FAMILY MEDICINE

## 2023-01-22 PROCEDURE — 6370000000 HC RX 637 (ALT 250 FOR IP): Performed by: FAMILY MEDICINE

## 2023-01-22 PROCEDURE — 36415 COLL VENOUS BLD VENIPUNCTURE: CPT

## 2023-01-22 RX ORDER — DICYCLOMINE HYDROCHLORIDE 10 MG/1
10 CAPSULE ORAL ONCE
Status: COMPLETED | OUTPATIENT
Start: 2023-01-22 | End: 2023-01-22

## 2023-01-22 RX ORDER — CIPROFLOXACIN 500 MG/1
500 TABLET, FILM COATED ORAL 2 TIMES DAILY
Qty: 10 TABLET | Refills: 0 | Status: SHIPPED | OUTPATIENT
Start: 2023-01-22 | End: 2023-01-27

## 2023-01-22 RX ORDER — BENZONATATE 100 MG/1
100 CAPSULE ORAL 3 TIMES DAILY PRN
Qty: 30 CAPSULE | Refills: 0 | Status: SHIPPED | OUTPATIENT
Start: 2023-01-22 | End: 2023-01-29

## 2023-01-22 RX ADMIN — DICYCLOMINE HYDROCHLORIDE 10 MG: 10 CAPSULE ORAL at 13:00

## 2023-01-22 RX ADMIN — IOPAMIDOL 50 ML: 612 INJECTION, SOLUTION INTRAVENOUS at 11:06

## 2023-01-22 ASSESSMENT — ENCOUNTER SYMPTOMS
ABDOMINAL PAIN: 1
SHORTNESS OF BREATH: 0
HEMATOCHEZIA: 0
NAUSEA: 1
HEMATEMESIS: 0
DIARRHEA: 0
RESPIRATORY NEGATIVE: 1
FLATUS: 0
VOMITING: 0
SORE THROAT: 0
COUGH: 0
BELCHING: 0
CONSTIPATION: 0

## 2023-01-22 ASSESSMENT — PAIN DESCRIPTION - LOCATION
LOCATION: ABDOMEN
LOCATION: ABDOMEN

## 2023-01-22 ASSESSMENT — PAIN DESCRIPTION - DESCRIPTORS: DESCRIPTORS: ACHING

## 2023-01-22 ASSESSMENT — LIFESTYLE VARIABLES
HOW OFTEN DO YOU HAVE A DRINK CONTAINING ALCOHOL: NEVER
HOW MANY STANDARD DRINKS CONTAINING ALCOHOL DO YOU HAVE ON A TYPICAL DAY: PATIENT DOES NOT DRINK
HOW OFTEN DO YOU HAVE A DRINK CONTAINING ALCOHOL: NEVER

## 2023-01-22 ASSESSMENT — PAIN DESCRIPTION - ORIENTATION: ORIENTATION: LEFT

## 2023-01-22 ASSESSMENT — PAIN SCALES - GENERAL
PAINLEVEL_OUTOF10: 4
PAINLEVEL_OUTOF10: 5

## 2023-01-22 NOTE — ED TRIAGE NOTES
Pt arrives to the ED with complaints of intermittent LLQ pain that pt describes as a shoot pain. A&Ox4, afebrile. Abdomen soft and non-tender to touch. States last bowel movement was yesterday.  Interpretor used #[id number]

## 2023-01-22 NOTE — ED NOTES
Pt lying in bed at this time  No complaints voiced at this time       Holli Wallis, GINNY  01/22/23 7012

## 2023-01-22 NOTE — ED PROVIDER NOTES
[address] [facility] ED  eMERGENCY dEPARTMENT eNCOUnter      Pt Name: Lawrence Choi  MRN: [id number]  [place] 1975  Date of evaluation: 1/22/2023  Provider: Mary Jo Mantilla MD    CHIEF COMPLAINT       Chief Complaint   Patient presents with    Abdominal Pain     LLQ sharp shooting pain. HISTORY OF PRESENT ILLNESS   (Location/Symptom, Timing/Onset,Context/Setting, Quality, Duration, Modifying Factors, Severity)  Note limiting factors. Lawrence Choi is a 56 y.o. female who presents to the emergency department ab pain      The history is provided by the patient. Abdominal Pain  Pain location:  LLQ  Pain quality: sharp and shooting    Pain radiates to:  Does not radiate  Pain severity:  Moderate  Onset quality:  Gradual  Duration:  1 week  Timing:  Intermittent  Progression:  Waxing and waning  Chronicity:  New  Context: not alcohol use, not awakening from sleep, not diet changes, not eating, not laxative use, not medication withdrawal, not previous surgeries, not recent illness, not recent sexual activity, not recent travel, not retching, not sick contacts, not suspicious food intake and not trauma    Relieved by:  Nothing  Worsened by:  Nothing  Ineffective treatments:  None tried  Associated symptoms: nausea    Associated symptoms: no anorexia, no belching, no chest pain, no chills, no constipation, no cough, no diarrhea, no dysuria, no fatigue, no fever, no flatus, no hematemesis, no hematochezia, no hematuria, no melena, no shortness of breath, no sore throat, no vaginal bleeding, no vaginal discharge and no vomiting      NursingNotes were reviewed. REVIEW OF SYSTEMS    (2-9 systems for level 4, 10 or more for level 5)     Review of Systems   Constitutional: Negative. Negative for chills, fatigue and fever. HENT: Negative. Negative for sore throat. Respiratory: Negative. Negative for cough and shortness of breath. Cardiovascular: Negative.   Negative for chest pain.   Gastrointestinal:  Positive for abdominal pain and nausea. Negative for anorexia, constipation, diarrhea, flatus, hematemesis, hematochezia, melena and vomiting. Genitourinary:  Negative for dysuria, hematuria, vaginal bleeding and vaginal discharge. Skin: Negative. Psychiatric/Behavioral: Negative. Except as noted above the remainder of the review of systems was reviewed and negative. PAST MEDICAL HISTORY     Past Medical History:   Diagnosis Date    Abnormal Pap smear of cervix     Behavior disturbance     Breast disorder     Encephalopathy 2/10/2019    HIV (human immunodeficiency virus infection) ([facility] [address] 2/25/2019    Infection due to urethral catheter (HCC)     Iron deficiency anemia secondary to inadequate dietary iron intake 3/3/4650    Metabolic encephalopathy 8/86/7288    Moderate malnutrition ([facility] [address] 2/18/2019    Noncompliance     Obstructed fallopian tubes 10/31/2016    Oligoclonal bands in cerebrospinal fluid 7/1/2019    Seasonal allergic rhinitis 12/9/2021    Tinea pedis of both feet 12/9/2021         SURGICALHISTORY       Past Surgical History:   Procedure Laterality Date    CERVICAL CERCLAGE  10 yrs ago    3 pregnancies & 3 cerclages    COLONOSCOPY N/A 2/4/2022    COLORECTAL CANCER SCREENING, NOT HIGH RISK performed by Isabel Fonseca MD at [address] 11/14/2016    HYSTEROSCOPY OPERATIVE  LAPAROSCOPY, Tristanian SPEAKING  performed by Angie Aranda DO at [address] [facility] Bilateral          [address]       Discharge Medication List as of 1/22/2023 12:55 PM        CONTINUE these medications which have NOT CHANGED    Details   GENVOYA [phone] MG TABLET TAKE 1 TABLET BY MOUTH EVERY DAY, Disp-30 tablet, R-0, DAWNormal      citalopram (CELEXA) 20 MG tablet Take 1 tablet by mouth daily, Disp-90 tablet, R-3Normal      terbinafine (LAMISIL) 1 % cream Apply topically 2 times daily. , Disp-42 g, R-5, Normal      Cholecalciferol (VITAMIN D3) 50 MCG (2000 UT) CAPS 1 po daily with meal, Disp-30 capsule, R-12Normal             ALLERGIES     Aspirin and Sulfa antibiotics    FAMILY HISTORY       Family History   Problem Relation Age of Onset    High Blood Pressure Mother     Stroke Mother     Diabetes Mother     No Known Problems Father     Colon Cancer Neg Hx           SOCIAL HISTORY       Social History     Socioeconomic History    Marital status: Single     Spouse name: None    Number of children: None    Years of education: None    Highest education level: None   Tobacco Use    Smoking status: Never    Smokeless tobacco: Never   Vaping Use    Vaping Use: Never used   Substance and Sexual Activity    Alcohol use: No     Comment: occasional    Drug use: No     Social Determinants of Health     Financial Resource Strain: Low Risk     Difficulty of Paying Living Expenses: Not hard at all   Food Insecurity: No Food Insecurity    Worried About Running Out of Food in the Last Year: Never true    Ran Out of Food in the Last Year: Never true   Transportation Needs: No Transportation Needs    Lack of Transportation (Medical): No    Lack of Transportation (Non-Medical): No       SCREENINGS    [place] Coma Scale  Eye Opening: Spontaneous  Best Verbal Response: Oriented  Best Motor Response: Obeys commands  Erich Coma Scale Score: 15 @FLOW([id number])@      PHYSICAL EXAM    (up to 7 for level 4, 8 or more for level 5)     ED Triage Vitals [01/22/23 1037]   BP Temp Temp Source Heart Rate Resp SpO2 Height Weight   108/81 98.7 °F (37.1 °C) Oral 90 20 100 % -- 137 lb (62.1 kg)       Physical Exam  Vitals and nursing note reviewed. Constitutional:       Appearance: She is well-developed. HENT:      Head: Normocephalic and atraumatic. Right Ear: External ear normal.      Left Ear: External ear normal.      Nose: Nose normal.   Eyes:      Pupils: Pupils are equal, round, and reactive to light.    Cardiovascular:      Rate and Rhythm: Normal rate and regular rhythm. Heart sounds: Normal heart sounds. Pulmonary:      Effort: Pulmonary effort is normal. No respiratory distress. Breath sounds: Normal breath sounds. No wheezing or rales. Chest:      Chest wall: No tenderness. Abdominal:      General: Abdomen is flat. Bowel sounds are normal.      Palpations: Abdomen is soft. Tenderness: There is abdominal tenderness in the left lower quadrant. Musculoskeletal:         General: Normal range of motion. Cervical back: Normal range of motion and neck supple. Skin:     General: Skin is warm and dry. Neurological:      Mental Status: She is alert and oriented to person, place, and time. Cranial Nerves: No cranial nerve deficit. Sensory: No sensory deficit. Motor: No abnormal muscle tone. Coordination: Coordination normal.      Deep Tendon Reflexes: Reflexes normal.   Psychiatric:         Behavior: Behavior normal.         Thought Content: Thought content normal.         Judgment: Judgment normal.       DIAGNOSTIC RESULTS     EKG: All EKG's are interpreted by the Emergency Department Physician who either signs or Co-signsthis chart in the absence of a cardiologist.        RADIOLOGY:   [facility] such as CT, Ultrasound and MRI are read by the radiologist. Plain radiographic images are visualized and preliminarily interpreted by the emergency physician with the below findings:        Interpretation per the Radiologist below, if available at the time ofthis note:    CT ABDOMEN PELVIS W IV CONTRAST Additional Contrast? None   Final Result   1. No signs of an acute infectious or inflammatory process within the abdomen   or pelvis   2. Right urolithiasis without obstructive uropathy.                ED BEDSIDE ULTRASOUND:   Performed by ED Physician - none    LABS:  Labs Reviewed   CBC WITH AUTO DIFFERENTIAL - Abnormal; Notable for the following components:       Result Value    MCHC 32.9 (*)     All other components within normal limits   COMPREHENSIVE METABOLIC PANEL W/ REFLEX TO MG FOR LOW K - Abnormal; Notable for the following components:    Calcium 10.0 (*)     All other components within normal limits   URINALYSIS WITH REFLEX TO CULTURE - Abnormal; Notable for the following components:    Clarity, UA CLOUDY (*)     Leukocyte Esterase, Urine SMALL (*)     All other components within normal limits   MICROSCOPIC URINALYSIS - Abnormal; Notable for the following components:    Bacteria, UA MANY (*)     WBC, UA 6-9 (*)     Epithelial Cells, UA >100 (*)     All other components within normal limits   PREGNANCY, URINE   LIPASE   LACTIC ACID   POCT VENOUS       All other labs were within normal range or not returned as of this dictation. EMERGENCY DEPARTMENT COURSE and DIFFERENTIAL DIAGNOSIS/MDM:   Vitals:    Vitals:    01/22/23 1200 01/22/23 1215 01/22/23 1230 01/22/23 1300   BP: 93/69  89/76 99/89   Pulse: 66 63 63    Resp: 13 16 12    Temp:       TempSrc:       SpO2: 100% 99% 100%    Weight:                    MDM  Number of Diagnoses or Management Options  Abdominal cramps: minor  Acute UTI: minor  Diagnosis management comments: 56years old presented to the ED with lower abdominal pain that have been going on for a week prescribed as mild to moderate  CBC CMP all performed and negative CT abdomen pelvis no acute intra-abdominal process. Urine with possible UTI patient will be treated empirically for prescription for bentyl l       Amount and/or Complexity of Data Reviewed  Clinical lab tests: ordered and reviewed  Tests in the radiology section of CPT®: ordered and reviewed        CONSULTS:  None    PROCEDURES:  Unless otherwise noted below, none     Procedures    FINAL IMPRESSION      1. Abdominal cramps    2.  Acute UTI          DISPOSITION/PLAN   DISPOSITION Decision To Discharge 01/22/2023 12:52:20 PM      PATIENT REFERRED TO:  Chaitanya Beck, APRN - CNP  [address] [facility] [address] [facility]  [phone]    In 3 days      DISCHARGE MEDICATIONS:  Discharge Medication List as of 1/22/2023 12:55 PM        START taking these medications    Details   ciprofloxacin (CIPRO) 500 MG tablet Take 1 tablet by mouth 2 times daily for 5 days, Disp-10 tablet, R-0Normal                (Please note thatportions of this note were completed with a voice recognition program.  Efforts were made to edit the dictations but occasionally words are mis-transcribed.)    Kathya Nix MD (electronically signed)  Attending Emergency Physician          Reno Tinajero MD  01/22/23 [phone]

## 2023-01-22 NOTE — ED NOTES
Pt sitting in room at this time   Pt on cellphone at this time     King Licona, GINNY  01/22/23 8418

## 2023-01-22 NOTE — ED NOTES
Labs collected at this time sent to lab, pt up to bathroom with steady gait.      RITCHIE Reynoso RN  01/22/23 1301

## 2023-01-26 DIAGNOSIS — B20 HIV INFECTION, UNSPECIFIED SYMPTOM STATUS (HCC): ICD-10-CM

## 2023-01-26 RX ORDER — ELVITEGRAVIR, COBICISTAT, EMTRICITABINE, AND TENOFOVIR ALAFENAMIDE 150; 150; 200; 10 MG/1; MG/1; MG/1; MG/1
TABLET ORAL
Qty: 30 TABLET | Refills: 0 | Status: SHIPPED | OUTPATIENT
Start: 2023-01-26

## 2023-02-08 ENCOUNTER — TELEPHONE (OUTPATIENT)
Dept: INFECTIOUS DISEASES | Age: 48
End: 2023-02-08

## 2023-02-08 NOTE — TELEPHONE ENCOUNTER
Per request, CM scheduled transport for Ortho appointment 2/16/23 @ 8:45AM.  CM assessed need, arrangements as last resort.

## 2023-02-17 DIAGNOSIS — B20 HIV INFECTION, UNSPECIFIED SYMPTOM STATUS (HCC): ICD-10-CM

## 2023-02-17 RX ORDER — ELVITEGRAVIR, COBICISTAT, EMTRICITABINE, AND TENOFOVIR ALAFENAMIDE 150; 150; 200; 10 MG/1; MG/1; MG/1; MG/1
TABLET ORAL
Qty: 30 TABLET | Refills: 2 | Status: SHIPPED | OUTPATIENT
Start: 2023-02-17 | End: 2023-03-30 | Stop reason: SDUPTHER

## 2023-03-07 ENCOUNTER — TELEPHONE (OUTPATIENT)
Dept: INFECTIOUS DISEASES | Age: 48
End: 2023-03-07

## 2023-03-07 NOTE — TELEPHONE ENCOUNTER
CM called pt to schedule an appointment for RW updates. CM scheduled appointment for 3/30/23 @ 10AM.  Per request, CM scheduled transport for appointment. CM assessed need, arrangements as last resort.

## 2023-03-14 ENCOUNTER — TELEPHONE (OUTPATIENT)
Dept: INFECTIOUS DISEASES | Age: 48
End: 2023-03-14

## 2023-03-14 NOTE — TELEPHONE ENCOUNTER
Per request, CM scheduled transport to appointment 3/16/23 @ 9AM with Ortho. CM assessed need, arrangements as last resort.

## 2023-03-30 ENCOUNTER — NURSE ONLY (OUTPATIENT)
Dept: INFECTIOUS DISEASES | Age: 48
End: 2023-03-30

## 2023-03-30 DIAGNOSIS — B20 HIV INFECTION, UNSPECIFIED SYMPTOM STATUS (HCC): ICD-10-CM

## 2023-03-30 RX ORDER — ELVITEGRAVIR, COBICISTAT, EMTRICITABINE, AND TENOFOVIR ALAFENAMIDE 150; 150; 200; 10 MG/1; MG/1; MG/1; MG/1
1 TABLET ORAL DAILY
Qty: 30 TABLET | Refills: 2 | Status: SHIPPED | OUTPATIENT
Start: 2023-03-30

## 2023-03-30 NOTE — PROGRESS NOTES
OHDAP/HIPSCA:  NA   Renewal Date:  NA    Income:    Employment     Legal Issues:    NA    Emergency Contact:   Extended Emergency Contact Information  Primary Emergency Contact: Basilio Jaramillo  Address: [address], [address] [facility] [address] Phone: [phone]  Mobile Phone: [phone]  Relation: Other  Secondary Emergency Contact: Rhina [facility] [address] Phone: [phone]  Relation: Child   needed? No  Pt presented for scheduled appointment with CM. CM completed Annual Review. CM updated RW Part A Eligibility for services. Pt has [facility], works part-time. CM reviewed Grievance Policy, Sliding Scale Fee and Transportation Policy with pt. CM completed PSA and SA/MH Assessments. Pt has hx of MH, smokes marijuana occasionally. CM updated ISP, pt agreed with POC. Pt remains compliant with HIV Care, VL is undetectable. Pt will need referral to dentist.  [address] will refer to [address] [place] [address].  Pt has not been sexually active, is aware of U=U, undetectable equals un-transmittable. CM provided socialization. Pt discussed partner moving to [place] for work. Pt is considering move. Partner has been away for 3 months. Pt also discussed need for son to have knee surgery. CM provided active listening and support. Per request, Cm will ask RN to call refills for needed meds to pharmacy. CM provided food voucher, need assessed. CM will follow up with pt as needed.

## 2023-04-03 ENCOUNTER — TELEPHONE (OUTPATIENT)
Dept: INFECTIOUS DISEASES | Age: 48
End: 2023-04-03

## 2023-04-20 ENCOUNTER — TELEPHONE (OUTPATIENT)
Dept: INFECTIOUS DISEASES | Age: 48
End: 2023-04-20

## 2023-04-20 DIAGNOSIS — B20 HIV INFECTION, UNSPECIFIED SYMPTOM STATUS (HCC): ICD-10-CM

## 2023-04-20 RX ORDER — ELVITEGRAVIR, COBICISTAT, EMTRICITABINE, AND TENOFOVIR ALAFENAMIDE 150; 150; 200; 10 MG/1; MG/1; MG/1; MG/1
1 TABLET ORAL DAILY
Qty: 30 TABLET | Refills: 2 | Status: SHIPPED | OUTPATIENT
Start: 2023-04-20

## 2023-05-24 DIAGNOSIS — B20 HIV INFECTION, UNSPECIFIED SYMPTOM STATUS (HCC): Primary | ICD-10-CM

## 2023-06-05 ENCOUNTER — TELEPHONE (OUTPATIENT)
Dept: INFECTIOUS DISEASES | Age: 48
End: 2023-06-05

## 2023-06-05 DIAGNOSIS — B20 HIV INFECTION, UNSPECIFIED SYMPTOM STATUS (HCC): ICD-10-CM

## 2023-06-05 LAB
ALBUMIN SERPL-MCNC: 4.6 G/DL (ref 3.5–4.6)
ALP SERPL-CCNC: 56 U/L (ref 40–130)
ALT SERPL-CCNC: 13 U/L (ref 0–33)
ANION GAP SERPL CALCULATED.3IONS-SCNC: 14 MEQ/L (ref 9–15)
AST SERPL-CCNC: 17 U/L (ref 0–35)
BILIRUB SERPL-MCNC: 0.4 MG/DL (ref 0.2–0.7)
BUN SERPL-MCNC: 10 MG/DL (ref 6–20)
CALCIUM SERPL-MCNC: 9.9 MG/DL (ref 8.5–9.9)
CHLORIDE SERPL-SCNC: 103 MEQ/L (ref 95–107)
CHOLEST SERPL-MCNC: 225 MG/DL (ref 0–199)
CO2 SERPL-SCNC: 24 MEQ/L (ref 20–31)
CREAT SERPL-MCNC: 0.54 MG/DL (ref 0.5–0.9)
ERYTHROCYTE [DISTWIDTH] IN BLOOD BY AUTOMATED COUNT: 13.1 % (ref 11.5–14.5)
GLOBULIN SER CALC-MCNC: 3.1 G/DL (ref 2.3–3.5)
GLUCOSE SERPL-MCNC: 73 MG/DL (ref 70–99)
HCT VFR BLD AUTO: 40.8 % (ref 37–47)
HDLC SERPL-MCNC: 46 MG/DL (ref 40–59)
HGB BLD-MCNC: 13.9 G/DL (ref 12–16)
LDLC SERPL CALC-MCNC: 153 MG/DL (ref 0–129)
MCH RBC QN AUTO: 30.3 PG (ref 27–31.3)
MCHC RBC AUTO-ENTMCNC: 34 % (ref 33–37)
MCV RBC AUTO: 89.1 FL (ref 79.4–94.8)
PLATELET # BLD AUTO: 192 K/UL (ref 130–400)
POTASSIUM SERPL-SCNC: 3.7 MEQ/L (ref 3.4–4.9)
PROT SERPL-MCNC: 7.7 G/DL (ref 6.3–8)
RBC # BLD AUTO: 4.58 M/UL (ref 4.2–5.4)
SODIUM SERPL-SCNC: 141 MEQ/L (ref 135–144)
TRIGL SERPL-MCNC: 132 MG/DL (ref 0–150)
WBC # BLD AUTO: 6.4 K/UL (ref 4.8–10.8)

## 2023-06-05 NOTE — TELEPHONE ENCOUNTER
Per request, Cm scheduled transport for the Followin/5/23 @ 2PM- lab    23 @ 9:30AM- [facility]    CM assessed need, arrangements as last resort.

## 2023-06-06 LAB
CD3+CD4+ CELLS # BLD: 681 CELLS/UL (ref 430–1800)
CD4 % HELPER T CELL: 37 % (ref 32–64)
IMMUNODEFICIENCY MARKERS SPEC-IMP: NORMAL
RPR SER QL: REACTIVE
RPR TITER: NORMAL
VITAMIN D 25-HYDROXY: 40 NG/ML

## 2023-06-07 ENCOUNTER — TELEPHONE (OUTPATIENT)
Dept: INFECTIOUS DISEASES | Age: 48
End: 2023-06-07

## 2023-06-07 DIAGNOSIS — Z13.220 ENCOUNTER FOR LIPID SCREENING FOR CARDIOVASCULAR DISEASE: Primary | ICD-10-CM

## 2023-06-07 DIAGNOSIS — E78.89 LIPIDS ABNORMAL: ICD-10-CM

## 2023-06-07 DIAGNOSIS — Z13.6 ENCOUNTER FOR LIPID SCREENING FOR CARDIOVASCULAR DISEASE: Primary | ICD-10-CM

## 2023-06-07 NOTE — TELEPHONE ENCOUNTER
TPPA discontinued  Spoke with Laci Valladares in micro. Test added on as pt has been + prior but TPPA has always been negative. Per Dr Tomeka Cruz previous was false +     Did review lab with Dr Tomeka Cruz today. Needs TPPA confirmation  Possible false + still as this is not 4 folds.

## 2023-06-08 ENCOUNTER — NURSE ONLY (OUTPATIENT)
Dept: INFECTIOUS DISEASES | Age: 48
End: 2023-06-08

## 2023-06-08 ENCOUNTER — OFFICE VISIT (OUTPATIENT)
Dept: INFECTIOUS DISEASES | Age: 48
End: 2023-06-08

## 2023-06-08 VITALS
BODY MASS INDEX: 22.31 KG/M2 | DIASTOLIC BLOOD PRESSURE: 66 MMHG | HEART RATE: 70 BPM | WEIGHT: 130 LBS | SYSTOLIC BLOOD PRESSURE: 111 MMHG | TEMPERATURE: 97.5 F

## 2023-06-08 DIAGNOSIS — E78.00 ELEVATED LDL CHOLESTEROL LEVEL: ICD-10-CM

## 2023-06-08 DIAGNOSIS — Z13.220 ENCOUNTER FOR LIPID SCREENING FOR CARDIOVASCULAR DISEASE: ICD-10-CM

## 2023-06-08 DIAGNOSIS — Z13.6 ENCOUNTER FOR LIPID SCREENING FOR CARDIOVASCULAR DISEASE: ICD-10-CM

## 2023-06-08 DIAGNOSIS — E78.89 LIPIDS ABNORMAL: ICD-10-CM

## 2023-06-08 DIAGNOSIS — E78.00 ELEVATED CHOLESTEROL: ICD-10-CM

## 2023-06-08 DIAGNOSIS — B20 CURRENTLY ASYMPTOMATIC HIV INFECTION, WITH HISTORY OF HIV-RELATED ILLNESS (HCC): Primary | ICD-10-CM

## 2023-06-08 LAB
CHOLEST SERPL-MCNC: 225 MG/DL (ref 0–199)
HDLC SERPL-MCNC: 46 MG/DL (ref 40–59)
LDLC SERPL CALC-MCNC: 157 MG/DL (ref 0–129)
TRIGL SERPL-MCNC: 109 MG/DL (ref 0–150)

## 2023-06-08 NOTE — PROGRESS NOTES
CM met with pt, provided socialization. CM inquired about her trip to [facility] to visit her father. Pt teared and stated it was not a good visit. Pt's father is using drugs, crack, is living in [facility], garbage all over house. CM provided active listening and support. Per request, CM scheduled transport to appointment with PCP on 6/15/23 @ 9AM.  CM assessed need, arrangements as last resort. CM provided food voucher, need assessed. CM will follow up with pt as needed.

## 2023-06-08 NOTE — PROGRESS NOTES
Rush Goddard  1975  female  Medical Record Number: [id number]      Infectious Disease Progress Note    Patient is a followup for HIV  Serofast syphilis titer hx in HIV. No new issues with this. Has a PCP and follows regularly. She is HIV controlled. No complaints today. Eating well. Feeling well. Working. General: Patient appears in no acute distress, pleasant and cooperative  Skin: no new rashes or erythema or induration  HEENT: PERRL, EOMI, MMM, Dentition is fair, Neck is supple, No cervical adenopathy  Heart: S1 S2  Lungs: clear bilaterally to auscultation  Abdomen: soft, ND, NTTP, +BS  Extrem:+pulses, no calf pain, no asymmetry of the upper or lower extremities  Neuro exam: CN II-XII intact, facial expressions symmertrical bilaterally, no slurred speech, muscle strength equal bilaterally    Imaging and labs were reviewed per medical records and any ID pertinent labs were addressed with the patient. Lab Results   Component Value Date    WBC 6.4 06/05/2023    HGB 13.9 06/05/2023    HCT 40.8 06/05/2023    MCV 89.1 06/05/2023     06/05/2023     Discussed her cholesterol panel - encouraged diet and exercise to help with her total cholesterol and LDL    Assessment:  HIV/AIDS - Genvoya, 100% compliance. Controlled. Plan:  Continue Genvoya  RPR is reactive but her prior T pallidum confirmation is NEGATIVE so this is a serofast in HIV. Needs eye appointment for completion. Needs dentist appointment for completion. She is preferring a Mohawk speaking provider if possible as a primary care.      Time spent today in combination of reviewing patient's chart, medications, labs, pictures when pertinent, provider communication as necessary, counseling patient, care/coordination not otherwise reported here, and patient face to face virtual visit 30 min.   >50% of that time spent counseling and coordination of patient care  Patient was also appropriately counseled on preventive

## 2023-06-10 LAB — T PALLIDUM AB SER QL AGGL: NON REACTIVE

## 2023-06-17 LAB
HIV-1 RNA BY PCR, QN: NOT DETECTED
SOURCE: NORMAL

## 2023-06-26 ENCOUNTER — TELEPHONE (OUTPATIENT)
Dept: INFECTIOUS DISEASES | Age: 48
End: 2023-06-26

## 2023-07-06 ENCOUNTER — OFFICE VISIT (OUTPATIENT)
Dept: FAMILY MEDICINE CLINIC | Age: 48
End: 2023-07-06
Payer: COMMERCIAL

## 2023-07-06 VITALS
DIASTOLIC BLOOD PRESSURE: 64 MMHG | OXYGEN SATURATION: 98 % | TEMPERATURE: 97.3 F | SYSTOLIC BLOOD PRESSURE: 118 MMHG | WEIGHT: 130 LBS | HEIGHT: 64 IN | HEART RATE: 85 BPM | BODY MASS INDEX: 22.2 KG/M2

## 2023-07-06 DIAGNOSIS — E55.9 VITAMIN D DEFICIENCY: ICD-10-CM

## 2023-07-06 DIAGNOSIS — F32.5 MAJOR DEPRESSIVE DISORDER WITH SINGLE EPISODE, IN FULL REMISSION (HCC): ICD-10-CM

## 2023-07-06 DIAGNOSIS — M25.512 ACUTE PAIN OF BOTH SHOULDERS: ICD-10-CM

## 2023-07-06 DIAGNOSIS — M25.511 ACUTE PAIN OF BOTH SHOULDERS: ICD-10-CM

## 2023-07-06 DIAGNOSIS — B20 HIV INFECTION, UNSPECIFIED SYMPTOM STATUS (HCC): ICD-10-CM

## 2023-07-06 DIAGNOSIS — B35.3 TINEA PEDIS OF LEFT FOOT: ICD-10-CM

## 2023-07-06 DIAGNOSIS — Z12.31 ENCOUNTER FOR SCREENING MAMMOGRAM FOR MALIGNANT NEOPLASM OF BREAST: Primary | ICD-10-CM

## 2023-07-06 DIAGNOSIS — E78.2 MIXED HYPERLIPIDEMIA: ICD-10-CM

## 2023-07-06 DIAGNOSIS — F41.9 ANXIETY: Chronic | ICD-10-CM

## 2023-07-06 PROCEDURE — 1036F TOBACCO NON-USER: CPT | Performed by: NURSE PRACTITIONER

## 2023-07-06 PROCEDURE — G8427 DOCREV CUR MEDS BY ELIG CLIN: HCPCS | Performed by: NURSE PRACTITIONER

## 2023-07-06 PROCEDURE — G8420 CALC BMI NORM PARAMETERS: HCPCS | Performed by: NURSE PRACTITIONER

## 2023-07-06 PROCEDURE — 99214 OFFICE O/P EST MOD 30 MIN: CPT | Performed by: NURSE PRACTITIONER

## 2023-07-06 RX ORDER — ELVITEGRAVIR, COBICISTAT, EMTRICITABINE, AND TENOFOVIR ALAFENAMIDE 150; 150; 200; 10 MG/1; MG/1; MG/1; MG/1
1 TABLET ORAL DAILY
Qty: 30 TABLET | Refills: 2 | Status: CANCELLED | OUTPATIENT
Start: 2023-07-06

## 2023-07-06 RX ORDER — ACETAMINOPHEN 160 MG
TABLET,DISINTEGRATING ORAL
Qty: 90 CAPSULE | Refills: 3 | Status: SHIPPED | OUTPATIENT
Start: 2023-07-06

## 2023-07-06 RX ORDER — PRENATAL VIT 91/IRON/FOLIC/DHA 28-975-200
COMBINATION PACKAGE (EA) ORAL
Qty: 42 G | Refills: 5 | Status: SHIPPED | OUTPATIENT
Start: 2023-07-06

## 2023-07-06 RX ORDER — CITALOPRAM 20 MG/1
20 TABLET ORAL DAILY
Qty: 90 TABLET | Refills: 3 | Status: SHIPPED | OUTPATIENT
Start: 2023-07-06

## 2023-07-06 SDOH — ECONOMIC STABILITY: HOUSING INSECURITY
IN THE LAST 12 MONTHS, WAS THERE A TIME WHEN YOU DID NOT HAVE A STEADY PLACE TO SLEEP OR SLEPT IN A SHELTER (INCLUDING NOW)?: NO

## 2023-07-06 SDOH — ECONOMIC STABILITY: INCOME INSECURITY: HOW HARD IS IT FOR YOU TO PAY FOR THE VERY BASICS LIKE FOOD, HOUSING, MEDICAL CARE, AND HEATING?: NOT HARD AT ALL

## 2023-07-06 SDOH — ECONOMIC STABILITY: FOOD INSECURITY: WITHIN THE PAST 12 MONTHS, YOU WORRIED THAT YOUR FOOD WOULD RUN OUT BEFORE YOU GOT MONEY TO BUY MORE.: SOMETIMES TRUE

## 2023-07-06 SDOH — ECONOMIC STABILITY: FOOD INSECURITY: WITHIN THE PAST 12 MONTHS, THE FOOD YOU BOUGHT JUST DIDN'T LAST AND YOU DIDN'T HAVE MONEY TO GET MORE.: SOMETIMES TRUE

## 2023-07-06 ASSESSMENT — PATIENT HEALTH QUESTIONNAIRE - PHQ9
1. LITTLE INTEREST OR PLEASURE IN DOING THINGS: 0
8. MOVING OR SPEAKING SO SLOWLY THAT OTHER PEOPLE COULD HAVE NOTICED. OR THE OPPOSITE, BEING SO FIGETY OR RESTLESS THAT YOU HAVE BEEN MOVING AROUND A LOT MORE THAN USUAL: 0
9. THOUGHTS THAT YOU WOULD BE BETTER OFF DEAD, OR OF HURTING YOURSELF: 0
2. FEELING DOWN, DEPRESSED OR HOPELESS: 0
7. TROUBLE CONCENTRATING ON THINGS, SUCH AS READING THE NEWSPAPER OR WATCHING TELEVISION: 0
10. IF YOU CHECKED OFF ANY PROBLEMS, HOW DIFFICULT HAVE THESE PROBLEMS MADE IT FOR YOU TO DO YOUR WORK, TAKE CARE OF THINGS AT HOME, OR GET ALONG WITH OTHER PEOPLE: 0
5. POOR APPETITE OR OVEREATING: 0
6. FEELING BAD ABOUT YOURSELF - OR THAT YOU ARE A FAILURE OR HAVE LET YOURSELF OR YOUR FAMILY DOWN: 0
SUM OF ALL RESPONSES TO PHQ QUESTIONS 1-9: 1
SUM OF ALL RESPONSES TO PHQ QUESTIONS 1-9: 1
4. FEELING TIRED OR HAVING LITTLE ENERGY: 1
SUM OF ALL RESPONSES TO PHQ QUESTIONS 1-9: 1
SUM OF ALL RESPONSES TO PHQ9 QUESTIONS 1 & 2: 0
SUM OF ALL RESPONSES TO PHQ QUESTIONS 1-9: 1
3. TROUBLE FALLING OR STAYING ASLEEP: 0

## 2023-07-06 ASSESSMENT — ENCOUNTER SYMPTOMS
RESPIRATORY NEGATIVE: 1
SHORTNESS OF BREATH: 0
BLOOD IN STOOL: 0
WHEEZING: 0
COUGH: 0

## 2023-07-21 ENCOUNTER — TELEPHONE (OUTPATIENT)
Dept: INFECTIOUS DISEASES | Age: 48
End: 2023-07-21

## 2023-07-21 NOTE — TELEPHONE ENCOUNTER
Per request, CM scheduled transport for the ff:    8/7/23 @ 2PM- Mammogram    8/10/23 @ 9:15A- OB/Gyn    CM assessed need, arrangements as last resort.

## 2023-08-07 ENCOUNTER — HOSPITAL ENCOUNTER (OUTPATIENT)
Dept: WOMENS IMAGING | Age: 48
Discharge: HOME | End: 2023-08-09
Payer: COMMERCIAL

## 2023-08-07 DIAGNOSIS — Z12.31 ENCOUNTER FOR SCREENING MAMMOGRAM FOR MALIGNANT NEOPLASM OF BREAST: ICD-10-CM

## 2023-08-07 PROCEDURE — 77063 BREAST TOMOSYNTHESIS BI: CPT

## 2023-08-09 ENCOUNTER — TELEPHONE (OUTPATIENT)
Dept: INFECTIOUS DISEASES | Age: 48
End: 2023-08-09

## 2023-08-10 ENCOUNTER — TELEPHONE (OUTPATIENT)
Dept: INFECTIOUS DISEASES | Age: 48
End: 2023-08-10

## 2023-08-10 ENCOUNTER — OFFICE VISIT (OUTPATIENT)
Dept: OBGYN CLINIC | Age: 48
End: 2023-08-10

## 2023-08-10 VITALS
HEIGHT: 64 IN | DIASTOLIC BLOOD PRESSURE: 60 MMHG | HEART RATE: 65 BPM | BODY MASS INDEX: 21.68 KG/M2 | SYSTOLIC BLOOD PRESSURE: 102 MMHG | WEIGHT: 127 LBS

## 2023-08-10 DIAGNOSIS — R10.2 PELVIC PAIN: ICD-10-CM

## 2023-08-10 DIAGNOSIS — Z01.419 WOMEN'S ANNUAL ROUTINE GYNECOLOGICAL EXAMINATION: Primary | ICD-10-CM

## 2023-08-10 ASSESSMENT — ENCOUNTER SYMPTOMS
BACK PAIN: 0
NAUSEA: 0
TROUBLE SWALLOWING: 0
SORE THROAT: 0
ABDOMINAL DISTENTION: 0
ABDOMINAL PAIN: 0
CHEST TIGHTNESS: 0
WHEEZING: 0
VOMITING: 0
BLOOD IN STOOL: 0
COLOR CHANGE: 0
COUGH: 0
VOICE CHANGE: 0
CONSTIPATION: 0
SHORTNESS OF BREATH: 0

## 2023-08-10 NOTE — PROGRESS NOTES
CHIEF COMPLAINT: Is here for her annual exam.  She recently had a mammogram that was normal.  Complaining of left lower quadrant pelvic pain. Denies constipation. No dyspareunia. No menorrhagia. Chief Complaint   Patient presents with    Annual Exam     Here for annual. Normal Pap in 21         HISTORY OF PRESENT ILLNESS:  56 y.o. female presents for her annual exam. She had no concernsor complaints today. Her menses is regular. She denies breakthrough bleeding, pelvic pain, or abnormal discharge. Bowel and bladder function is normal. Her health overallhas been good.      Past Medical History:   Diagnosis Date    Abnormal Pap smear of cervix     Behavior disturbance     Breast disorder     Encephalopathy 2/10/2019    HIV (human immunodeficiency virus infection) ([address]) 2/25/2019    Infection due to urethral catheter (HCC)     Iron deficiency anemia secondary to inadequate dietary iron intake 9/4/0289    Metabolic encephalopathy 8/06/7464    Moderate malnutrition (HCC) 2/18/2019    Noncompliance     Obstructed fallopian tubes 10/31/2016    Oligoclonal bands in cerebrospinal fluid 7/1/2019    Seasonal allergic rhinitis 12/9/2021    Tinea pedis of both feet 12/9/2021     Past Surgical History:   Procedure Laterality Date    CERVICAL CERCLAGE  10 yrs ago    3 pregnancies & 3 cerclages    COLONOSCOPY N/A 2/4/2022    COLORECTAL CANCER SCREENING, NOT HIGH RISK performed by Irvin Jean MD at [address] N/A 11/14/2016    HYSTEROSCOPY OPERATIVE  LAPAROSCOPY, Tu[place]uan SPEAKING  performed by Heena Sweeney DO at [address] Bilateral      Family History   Problem Relation Age of Onset    High Blood Pressure Mother     Stroke Mother     Diabetes Mother     No Known Problems Father     Colon Cancer Neg Hx      Social History     Socioeconomic History    Marital status: Single     Spouse name: Not on file    Number of children: Not on file    Years of education: Not on file

## 2023-08-10 NOTE — TELEPHONE ENCOUNTER
Per request, CM scheduled transport for sonogram on 8/17/23 @ 9:30AM.  CM assessed need, arrangements as last resort.

## 2023-08-17 ENCOUNTER — HOSPITAL ENCOUNTER (OUTPATIENT)
Dept: ULTRASOUND IMAGING | Age: 48
Discharge: HOME | End: 2023-08-19
Attending: OBSTETRICS & GYNECOLOGY
Payer: COMMERCIAL

## 2023-08-17 DIAGNOSIS — R10.2 PELVIC PAIN: ICD-10-CM

## 2023-08-17 PROCEDURE — 76830 TRANSVAGINAL US NON-OB: CPT

## 2023-08-17 PROCEDURE — 93975 VASCULAR STUDY: CPT

## 2023-08-17 PROCEDURE — 76856 US EXAM PELVIC COMPLETE: CPT

## 2023-08-18 ENCOUNTER — TELEPHONE (OUTPATIENT)
Dept: OBGYN CLINIC | Age: 48
End: 2023-08-18

## 2023-09-15 ENCOUNTER — TELEPHONE (OUTPATIENT)
Dept: INFECTIOUS DISEASES | Age: 48
End: 2023-09-15

## 2023-09-15 DIAGNOSIS — B20 CURRENTLY ASYMPTOMATIC HIV INFECTION, WITH HISTORY OF HIV-RELATED ILLNESS (HCC): Primary | ICD-10-CM

## 2023-09-15 NOTE — TELEPHONE ENCOUNTER
Call placed to pt to discuss need for 6 month ISP update. Reviewed with pt.    Apt scheduled 9.27.23  Phone   Denies other concerns and will call office if needed

## 2023-09-28 ENCOUNTER — NURSE ONLY (OUTPATIENT)
Dept: INFECTIOUS DISEASES | Age: 48
End: 2023-09-28

## 2023-09-28 NOTE — PROGRESS NOTES
Appointment completed via phone. Cm called pt for scheduled appointment. CM completed Semi-Annual Review. No changes noted to RW Part A Eligibility for services. CM updated ISP, pt agreed with POC. Plan of Care includes: Medical, Dental, Transportation, and [employer]. CM provided socialization, no specific issues discussed. CM will follow up with pt as needed.

## 2023-10-01 DIAGNOSIS — B20 HIV INFECTION, UNSPECIFIED SYMPTOM STATUS (HCC): ICD-10-CM

## 2023-10-02 RX ORDER — ELVITEGRAVIR, COBICISTAT, EMTRICITABINE, AND TENOFOVIR ALAFENAMIDE 150; 150; 200; 10 MG/1; MG/1; MG/1; MG/1
1 TABLET ORAL DAILY
Qty: 90 TABLET | Refills: 1 | Status: SHIPPED | OUTPATIENT
Start: 2023-10-02

## 2023-10-05 ENCOUNTER — TELEPHONE (OUTPATIENT)
Dept: INFECTIOUS DISEASES | Age: 48
End: 2023-10-05

## 2023-10-05 NOTE — TELEPHONE ENCOUNTER
Per request, CM scheduled transport to appointment with PCP on 10/12/23 @ 9:30AM.  CM assessed need, arrangements as last resort. Pt is aware of Transportation Policy.  10/12/23 @ 9AM from pt's residence:  [address] [place] [address] [facility]  [address] [place] [id number]    Request is for a roundtrip.

## 2023-10-11 ENCOUNTER — TELEPHONE (OUTPATIENT)
Dept: INFECTIOUS DISEASES | Age: 48
End: 2023-10-11

## 2023-10-12 ENCOUNTER — OFFICE VISIT (OUTPATIENT)
Dept: FAMILY MEDICINE CLINIC | Age: 48
End: 2023-10-12
Payer: COMMERCIAL

## 2023-10-12 VITALS
WEIGHT: 125 LBS | HEART RATE: 90 BPM | SYSTOLIC BLOOD PRESSURE: 124 MMHG | DIASTOLIC BLOOD PRESSURE: 64 MMHG | TEMPERATURE: 97.4 F | HEIGHT: 64 IN | OXYGEN SATURATION: 96 % | BODY MASS INDEX: 21.34 KG/M2

## 2023-10-12 DIAGNOSIS — F32.5 MAJOR DEPRESSIVE DISORDER WITH SINGLE EPISODE, IN FULL REMISSION (HCC): Primary | ICD-10-CM

## 2023-10-12 DIAGNOSIS — Z23 IMMUNIZATION DUE: ICD-10-CM

## 2023-10-12 DIAGNOSIS — E55.9 VITAMIN D DEFICIENCY: ICD-10-CM

## 2023-10-12 DIAGNOSIS — R05.1 ACUTE COUGH: ICD-10-CM

## 2023-10-12 DIAGNOSIS — E78.2 MIXED HYPERLIPIDEMIA: ICD-10-CM

## 2023-10-12 DIAGNOSIS — Z21 ASYMPTOMATIC HIV INFECTION, WITH NO HISTORY OF HIV-RELATED ILLNESS (HCC): ICD-10-CM

## 2023-10-12 PROCEDURE — 99214 OFFICE O/P EST MOD 30 MIN: CPT | Performed by: NURSE PRACTITIONER

## 2023-10-12 PROCEDURE — 1036F TOBACCO NON-USER: CPT | Performed by: NURSE PRACTITIONER

## 2023-10-12 PROCEDURE — G8484 FLU IMMUNIZE NO ADMIN: HCPCS | Performed by: NURSE PRACTITIONER

## 2023-10-12 PROCEDURE — G8427 DOCREV CUR MEDS BY ELIG CLIN: HCPCS | Performed by: NURSE PRACTITIONER

## 2023-10-12 PROCEDURE — 87426 SARSCOV CORONAVIRUS AG IA: CPT | Performed by: NURSE PRACTITIONER

## 2023-10-12 PROCEDURE — G8420 CALC BMI NORM PARAMETERS: HCPCS | Performed by: NURSE PRACTITIONER

## 2023-10-12 RX ORDER — ACETAMINOPHEN 160 MG
TABLET,DISINTEGRATING ORAL
Qty: 90 CAPSULE | Refills: 3 | Status: SHIPPED | OUTPATIENT
Start: 2023-10-12

## 2023-10-12 RX ORDER — BROMPHENIRAMINE MALEATE, PSEUDOEPHEDRINE HYDROCHLORIDE, AND DEXTROMETHORPHAN HYDROBROMIDE 2; 30; 10 MG/5ML; MG/5ML; MG/5ML
10 SYRUP ORAL 4 TIMES DAILY PRN
Qty: 240 ML | Refills: 0 | Status: SHIPPED | OUTPATIENT
Start: 2023-10-12

## 2023-10-12 ASSESSMENT — ENCOUNTER SYMPTOMS: BLOOD IN STOOL: 0

## 2023-10-12 NOTE — PROGRESS NOTES
SpO2: 96%   Weight: 125 lb (56.7 kg)   Height: 5' 4\" (1.626 m)       Physical Exam  Constitutional:       Appearance: Normal appearance. HENT:      Head: Normocephalic. Cardiovascular:      Rate and Rhythm: Normal rate and regular rhythm. Heart sounds: No murmur heard. Pulmonary:      Effort: Pulmonary effort is normal. No respiratory distress. Breath sounds: Normal breath sounds. No wheezing. Skin:     General: Skin is warm and dry. Neurological:      General: No focal deficit present. Mental Status: She is alert and oriented to person, place, and time. ASSESSMENT/ PLAN    1. Major depressive disorder with single episode, in full remission ([address])  Stable on celexa. 2. Mixed hyperlipidemia  Recheck ordered, not currently on medication.   - Lipid, Fasting; Future    3. Vitamin D deficiency  Vitamin d refilled. - Cholecalciferol (VITAMIN D3) 50 MCG (2000 UT) CAPS; 1 po daily with meal  Dispense: 90 capsule; Refill: 3    4. Asymptomatic HIV infection, with no history of HIV-related illness (HCC)  Sees ID, chronic. 5. Immunization due  Flu and covid shot due, due to insurance is to go to [facility]. 6. Acute cough  Bromfed sent to pharmacy. Covid negative. - brompheniramine-pseudoephedrine-DM 2-30-10 MG/5ML syrup; Take 10 mLs by mouth 4 times daily as needed for Congestion or Cough  Dispense: 240 mL; Refill: 0  - POCT COVID-19, Antigen              On this date 10/12/2023 I have spent 30 minutes reviewing previous notes, test results and face to face with the patient discussing the diagnosis and importance of compliance with the treatment plan as well as documenting on the day of the visit. Return in about 6 months (around 4/12/2024).      Electronically signed by:  JESU Beltran CNP   10/12/23

## 2024-01-03 ENCOUNTER — TELEPHONE (OUTPATIENT)
Dept: INFECTIOUS DISEASES | Age: 49
End: 2024-01-03

## 2024-01-03 NOTE — TELEPHONE ENCOUNTER
Call placed to pt as appt reminder. she  also needs lab work completed.   Reviewed appt date and time. Denies issues with appt day.       Rn and pt reviewed additional transport options and assistance. It is determined that pt will need transport assistance as last resort.     Pt states she is Aware of transport policy and  time. [facility] notified.   All appts are related to medical treatment and are necessary for compliance    This transportation assistance will help patient remain in medical care by enabling them to access medical and support services    RN scheduled transportation per pt request,  1st APT.    location and  time: 930  Residency:   [address] [place] [address]  [phone] (home)       Appt Location, date and apt time:   1.11.24 at 10 [facility] lab  [address] [place]     Round trip    2nd apt:   location and  time: 9  Residency:   [address] [place], [place] [id number]  [phone] (home)       Appt Location, date and apt time:   1.25.24 at 930 Dr Glover  [address]    Round trip        Denies any needs at this time. Denies any issues that need Doctor attention. Will call with concern       Exploratory laparotomy, excision of infected mesh and repair of ventral heria (N/A) on 07/21/2020  Wound care and dressing changes per surgery    Pain management  See above

## 2024-01-08 ENCOUNTER — TELEPHONE (OUTPATIENT)
Dept: INFECTIOUS DISEASES | Age: 49
End: 2024-01-08

## 2024-01-08 NOTE — TELEPHONE ENCOUNTER
Per request, CM added transport to 1/11/24.  Pt to have lab work and then to be taken to appointment at [facility].  CM assessed need, arrangements as last resort.  Pt is aware of Transportation Policy.    [facility]  [address] [place] [phone]

## 2024-01-11 DIAGNOSIS — B20 CURRENTLY ASYMPTOMATIC HIV INFECTION, WITH HISTORY OF HIV-RELATED ILLNESS (HCC): ICD-10-CM

## 2024-01-11 DIAGNOSIS — E78.2 MIXED HYPERLIPIDEMIA: ICD-10-CM

## 2024-01-11 LAB
ALBUMIN SERPL-MCNC: 4.1 G/DL (ref 3.5–4.6)
ALP SERPL-CCNC: 49 U/L (ref 40–130)
ALT SERPL-CCNC: 17 U/L (ref 0–33)
ANION GAP SERPL CALCULATED.3IONS-SCNC: 11 MEQ/L (ref 9–15)
AST SERPL-CCNC: 19 U/L (ref 0–35)
BILIRUB SERPL-MCNC: 0.3 MG/DL (ref 0.2–0.7)
BUN SERPL-MCNC: 12 MG/DL (ref 6–20)
CALCIUM SERPL-MCNC: 9.4 MG/DL (ref 8.5–9.9)
CHLORIDE SERPL-SCNC: 105 MEQ/L (ref 95–107)
CHOLEST SERPL-MCNC: 218 MG/DL (ref 0–199)
CO2 SERPL-SCNC: 24 MEQ/L (ref 20–31)
CREAT SERPL-MCNC: 0.72 MG/DL (ref 0.5–0.9)
ERYTHROCYTE [DISTWIDTH] IN BLOOD BY AUTOMATED COUNT: 13.4 % (ref 11.5–14.5)
GLOBULIN SER CALC-MCNC: 3.7 G/DL (ref 2.3–3.5)
GLUCOSE SERPL-MCNC: 74 MG/DL (ref 70–99)
HCT VFR BLD AUTO: 40.6 % (ref 37–47)
HDLC SERPL-MCNC: 39 MG/DL (ref 40–59)
HGB BLD-MCNC: 13.3 G/DL (ref 12–16)
LDL CHOLESTEROL CALCULATED: 157 MG/DL (ref 0–129)
MCH RBC QN AUTO: 28.5 PG (ref 27–31.3)
MCHC RBC AUTO-ENTMCNC: 32.8 % (ref 33–37)
MCV RBC AUTO: 86.9 FL (ref 79.4–94.8)
PLATELET # BLD AUTO: 218 K/UL (ref 130–400)
POTASSIUM SERPL-SCNC: 4.4 MEQ/L (ref 3.4–4.9)
PROT SERPL-MCNC: 7.8 G/DL (ref 6.3–8)
RBC # BLD AUTO: 4.67 M/UL (ref 4.2–5.4)
SODIUM SERPL-SCNC: 140 MEQ/L (ref 135–144)
TRIGLYCERIDE, FASTING: 109 MG/DL (ref 0–150)
WBC # BLD AUTO: 4.3 K/UL (ref 4.8–10.8)

## 2024-01-13 LAB
CD3+CD4+ CELLS # BLD: 377 CELLS/UL (ref 430–1800)
CD4 % HELPER T CELL: 18 % (ref 32–64)
IMMUNODEFICIENCY MARKERS SPEC-IMP: ABNORMAL

## 2024-01-15 LAB
HIV QUANT LOG: 2.53 LOG CPY/ML
HIV-1 RNA BY PCR, QN: 340 CPY/ML
HIV-1 RNA BY PCR, QN: DETECTED
SOURCE: ABNORMAL

## 2024-01-24 ENCOUNTER — TELEPHONE (OUTPATIENT)
Dept: INFECTIOUS DISEASES | Age: 49
End: 2024-01-24

## 2024-01-24 NOTE — TELEPHONE ENCOUNTER
CM called pt to  remind her of  transportation scheduled for appointment with Dr. Glover on 1/25/24.

## 2024-01-25 ENCOUNTER — NURSE ONLY (OUTPATIENT)
Dept: INFECTIOUS DISEASES | Age: 49
End: 2024-01-25

## 2024-01-25 ENCOUNTER — OFFICE VISIT (OUTPATIENT)
Dept: INFECTIOUS DISEASES | Age: 49
End: 2024-01-25

## 2024-01-25 VITALS
TEMPERATURE: 97.9 F | HEART RATE: 83 BPM | BODY MASS INDEX: 21.34 KG/M2 | HEIGHT: 64 IN | WEIGHT: 125 LBS | DIASTOLIC BLOOD PRESSURE: 61 MMHG | OXYGEN SATURATION: 99 % | SYSTOLIC BLOOD PRESSURE: 90 MMHG

## 2024-01-25 DIAGNOSIS — B20 HIV INFECTION, UNSPECIFIED SYMPTOM STATUS (HCC): Primary | ICD-10-CM

## 2024-01-25 DIAGNOSIS — E78.00 HYPERCHOLESTEROLEMIA: ICD-10-CM

## 2024-01-25 DIAGNOSIS — Z12.11 SCREENING FOR MALIGNANT NEOPLASM OF COLON: ICD-10-CM

## 2024-01-25 ASSESSMENT — PATIENT HEALTH QUESTIONNAIRE - PHQ9
SUM OF ALL RESPONSES TO PHQ QUESTIONS 1-9: 2
1. LITTLE INTEREST OR PLEASURE IN DOING THINGS: 1
2. FEELING DOWN, DEPRESSED OR HOPELESS: 1
SUM OF ALL RESPONSES TO PHQ QUESTIONS 1-9: 2
7. TROUBLE CONCENTRATING ON THINGS, SUCH AS READING THE NEWSPAPER OR WATCHING TELEVISION: 0
9. THOUGHTS THAT YOU WOULD BE BETTER OFF DEAD, OR OF HURTING YOURSELF: 0
3. TROUBLE FALLING OR STAYING ASLEEP: 0
8. MOVING OR SPEAKING SO SLOWLY THAT OTHER PEOPLE COULD HAVE NOTICED. OR THE OPPOSITE, BEING SO FIGETY OR RESTLESS THAT YOU HAVE BEEN MOVING AROUND A LOT MORE THAN USUAL: 0
SUM OF ALL RESPONSES TO PHQ QUESTIONS 1-9: 2
10. IF YOU CHECKED OFF ANY PROBLEMS, HOW DIFFICULT HAVE THESE PROBLEMS MADE IT FOR YOU TO DO YOUR WORK, TAKE CARE OF THINGS AT HOME, OR GET ALONG WITH OTHER PEOPLE: 1
5. POOR APPETITE OR OVEREATING: 0
SUM OF ALL RESPONSES TO PHQ QUESTIONS 1-9: 2
SUM OF ALL RESPONSES TO PHQ9 QUESTIONS 1 & 2: 2
4. FEELING TIRED OR HAVING LITTLE ENERGY: 0
6. FEELING BAD ABOUT YOURSELF - OR THAT YOU ARE A FAILURE OR HAVE LET YOURSELF OR YOUR FAMILY DOWN: 0

## 2024-01-25 NOTE — PROGRESS NOTES
Blanche AMBRIZ Tapan  1975  female  Medical Record Number: [id number]      Infectious Disease Progress Note    Patient is a followup for HIV  Serofast syphilis titer hx in HIV. No new issues with this.   Has a PCP and follows regularly.     She is HIV controlled.   No complaints today. Eating well. Feeling well.     General: Patient appears in no acute distress, pleasant and cooperative  Skin: no new rashes or erythema or induration  HEENT: PERRL, EOMI, MMM, Dentition is fair, Neck is supple, No cervical adenopathy  Heart: S1 S2  Lungs: clear bilaterally to auscultation  Abdomen: soft, ND, NTTP, +BS  Extrem:+pulses, no calf pain, no asymmetry of the upper or lower extremities  Neuro exam: CN II-XII intact, facial expressions symmertrical bilaterally, no slurred speech, muscle strength equal bilaterally    Imaging and labs were reviewed per medical records and any ID pertinent labs were addressed with the patient.     Lab Results   Component Value Date    WBC 4.3 (L) 01/11/2024    HGB 13.3 01/11/2024    HCT 40.6 01/11/2024    MCV 86.9 01/11/2024     01/11/2024     Discussed her cholesterol panel - encouraged diet and exercise to help with her total cholesterol and LDL    Assessment:  HIV/AIDS - Genvoya, 100% compliance. Controlled.   Hyperlipidemia, diet exercise both discussed with the patient  Screening colonoscopy needed    Plan:  Continue Genvoya  Missed 11 days of medications - restarted and repeat labs pending.   Doing well  Discussed LDL and total Cholesterol - diet and exercise recommended.  Start fish oil OTC  Needs referral for colonoscopy  RPR is reactive but her prior T pallidum confirmation is NEGATIVE so this is a serofast in HIV.     Next eye appointment March 25  Last dental appointment was one year ago.   Has obtained a primary care provider    Time spent today in combination of reviewing patient's chart, medications, labs, pictures when pertinent, provider communication as necessary,

## 2024-01-25 NOTE — PROGRESS NOTES
CM met with pt, discussed VL.  Pt was previously undetectable, no has 340 copies.  Pt stated she missed a week of medications, however, \"it will never happen again\".  CM provided support and encouragement.  Pt discussed concerns about father in [place].  Father has been on drugs for many years, however, his mental state has worsened recently.  Pt would like to visit him, but does not want to see him in his current status. CM provided active listening and support.  CM will follow up with pt as needed.

## 2024-01-25 NOTE — PROGRESS NOTES
Routine apt today.     Compliant with medications --- missed 11 days this month. Ran out. Discussed importance. Back on track. Per Dr Adalberto gonzalez in 1 month  Has full bottle of Genvoya now. No misses since.   Discussed cholesterol-- fish oil per Dr Schmidt OTC.      Current partner -- yes   HIV + -- unknown med status     Flu vaccine discussed. - today      Pneumonia vacc? -- completed  Needs updated-- 2025       Hep B vaccine/screening -- completed     Smoking?  no   Etoh? no  Drugs? no      Transportation: uses public transport.      PCP-- established     Working-- [employer]     Housing-- stable     Dental-- Encouraged --  1 year ago. Needed extractions per pt. She does not want to schedule follow up at this time. Discussed [facility]- does not want help with scheduling at this time     Mental Health -- \"I am good, down a little bc dad is doing drugs\"      OBGYN: reviewed importance and encouraged follow up. She goes routinely     Colonoscopy: -- referral for screening today     HIV primary care-- hospital out pt  Housing-- stable  HIV risk counseling--- yes  Mental health-- yes  Substance abuse--- yes    U=U was discussed at today's appointment. Pt  verbalizes understanding of U=U.

## 2024-02-08 ENCOUNTER — TELEPHONE (OUTPATIENT)
Dept: INFECTIOUS DISEASES | Age: 49
End: 2024-02-08

## 2024-02-08 NOTE — TELEPHONE ENCOUNTER
Pt informed that she was referred for colonoscopy by Dr. Glover 1/25/24.  However, she was told that she had a coloscopy in 2022, and is unable to have another.  CM will inform RN of same.

## 2024-02-23 ENCOUNTER — TELEPHONE (OUTPATIENT)
Dept: INFECTIOUS DISEASES | Age: 49
End: 2024-02-23

## 2024-02-23 NOTE — TELEPHONE ENCOUNTER
Call placed to pt regarding need to update RW eligibility and paperwork.     Reviewed all needed documentation for update. This includes proof of income ( 1 month total, most recent) proof of residency and/or ID as well as any insurance updates or changes.     pt is aware of the program, verbalized understanding and denies questions.     YASIR apt made 2.28.24 via phone.  She will provide updated income.   T

## 2024-02-23 NOTE — TELEPHONE ENCOUNTER
Pt has scheduled CM apt. Needs VL recheck. Scheduled originally via phone. Pt will now come to office to meet with CM for renewal and gets labs done same day.     Rn and pt reviewed additional transport options and assistance. It is determined that pt will need transport assistance as last resort.     Pt states she is Aware of transport policy and  time. Luc[place] notified.   All appts are related to medical treatment and are necessary for compliance    This transportation assistance will help patient remain in medical care by enabling them to access medical and support services    RN scheduled transportation per pt request,[phone]     location and  time:   Residency:   [address] [id number] [phone] (home)       Appt Location, date and apt time:   2.28.24 at 10 -- CM and labs  [address] [facility]     Round trip    Denies any needs at this time. Denies any issues that need Doctor attention. Will call with concern

## 2024-02-28 ENCOUNTER — NURSE ONLY (OUTPATIENT)
Dept: INFECTIOUS DISEASES | Age: 49
End: 2024-02-28

## 2024-02-28 DIAGNOSIS — B20 HIV INFECTION, UNSPECIFIED SYMPTOM STATUS (HCC): ICD-10-CM

## 2024-02-28 LAB
ALBUMIN SERPL-MCNC: 4.4 G/DL (ref 3.5–4.6)
ALP SERPL-CCNC: 48 U/L (ref 40–130)
ALT SERPL-CCNC: 16 U/L (ref 0–33)
ANION GAP SERPL CALCULATED.3IONS-SCNC: 9 MEQ/L (ref 9–15)
AST SERPL-CCNC: 16 U/L (ref 0–35)
BILIRUB SERPL-MCNC: 0.4 MG/DL (ref 0.2–0.7)
BUN SERPL-MCNC: 12 MG/DL (ref 6–20)
CALCIUM SERPL-MCNC: 9.6 MG/DL (ref 8.5–9.9)
CHLORIDE SERPL-SCNC: 104 MEQ/L (ref 95–107)
CO2 SERPL-SCNC: 25 MEQ/L (ref 20–31)
CREAT SERPL-MCNC: 0.61 MG/DL (ref 0.5–0.9)
ERYTHROCYTE [DISTWIDTH] IN BLOOD BY AUTOMATED COUNT: 14 % (ref 11.5–14.5)
GLOBULIN SER CALC-MCNC: 3.3 G/DL (ref 2.3–3.5)
GLUCOSE SERPL-MCNC: 64 MG/DL (ref 70–99)
HCT VFR BLD AUTO: 40.7 % (ref 37–47)
HGB BLD-MCNC: 13.1 G/DL (ref 12–16)
MCH RBC QN AUTO: 28.7 PG (ref 27–31.3)
MCHC RBC AUTO-ENTMCNC: 32.2 % (ref 33–37)
MCV RBC AUTO: 89.3 FL (ref 79.4–94.8)
PLATELET # BLD AUTO: 219 K/UL (ref 130–400)
POTASSIUM SERPL-SCNC: 4.7 MEQ/L (ref 3.4–4.9)
PROT SERPL-MCNC: 7.7 G/DL (ref 6.3–8)
RBC # BLD AUTO: 4.56 M/UL (ref 4.2–5.4)
SODIUM SERPL-SCNC: 138 MEQ/L (ref 135–144)
WBC # BLD AUTO: 6 K/UL (ref 4.8–10.8)

## 2024-02-28 NOTE — PROGRESS NOTES
additional supports    []    [] Indicates inadequate support system    []    [] Indicates no identified support system      [x]    [] Has disclosed HIV status to all sexual and drug injection partners and household members (refer to substance abuse and legal issues sections)    []     [] Has disclosed HIV status to most members of the household and sexual or drug injection partners, but requests disclosure support(refer to substance abuse and legal issues sections)    []    [] Reports feeling isolated or unsupported in relationships    []    [] Has not disclosed HIV status to any members of the household including sexual and drug injection partners (potential barrier to medication adherence,risk for transmission) (refer to substance abuse and legal issues sections)      [x]    [] Does not identify disclosure of HIV status as a barrier to medication adherence       []    [] Has not disclosed HIV status to all members of the household, including some sexual or drug some sexual or drug injection partners(potential barrier to medication adherence, risk for transmission)  (Refer to substance abuse and lefal issues sections)    []    [] Death/loss of primary support person         15. SEXUAL HEALTH/RISK REDUCTION                               Annual Score=     0                      Semi-Annual Score =           Evaluate the client's sexual health risks as it relate to their overall health and well-being    **inform the client that there are some basic things about sexual health that the medical case manager discussed with all clients when   completing an assessment. While this topic may be uncomfortable, it is important to acknowledge sexuality and sexual relationships as important  elements of an individual's overall health and well-being. Be sure to provide education surrounding risk reduction and methods to obtain safe sex protection (e.g., barrier protection, PrEP, etc.).**    78. How many sex partners have you had

## 2024-02-29 LAB
CD3+CD4+ CELLS # BLD: 472 CELLS/UL (ref 430–1800)
CD4 % HELPER T CELL: 25 % (ref 32–64)
IMMUNODEFICIENCY MARKERS SPEC-IMP: ABNORMAL

## 2024-03-04 LAB
HIV QUANT LOG: 1.41 LOG CPY/ML
HIV-1 RNA BY PCR, QN: 25.6 CPY/ML
HIV-1 RNA BY PCR, QN: DETECTED
SOURCE: ABNORMAL

## 2024-03-11 DIAGNOSIS — B20 HIV INFECTION, UNSPECIFIED SYMPTOM STATUS (HCC): ICD-10-CM

## 2024-03-12 RX ORDER — ELVITEGRAVIR, COBICISTAT, EMTRICITABINE, AND TENOFOVIR ALAFENAMIDE 150; 150; 200; 10 MG/1; MG/1; MG/1; MG/1
1 TABLET ORAL DAILY
Qty: 90 TABLET | Refills: 1 | Status: SHIPPED | OUTPATIENT
Start: 2024-03-12 | End: 2024-03-14

## 2024-03-13 DIAGNOSIS — B20 HIV INFECTION, UNSPECIFIED SYMPTOM STATUS (HCC): ICD-10-CM

## 2024-03-14 RX ORDER — ELVITEGRAVIR, COBICISTAT, EMTRICITABINE, AND TENOFOVIR ALAFENAMIDE 150; 150; 200; 10 MG/1; MG/1; MG/1; MG/1
1 TABLET ORAL DAILY
Qty: 90 TABLET | Refills: 1 | Status: SHIPPED | OUTPATIENT
Start: 2024-03-14 | End: 2024-03-15 | Stop reason: SDUPTHER

## 2024-03-15 DIAGNOSIS — B20 HIV INFECTION, UNSPECIFIED SYMPTOM STATUS (HCC): ICD-10-CM

## 2024-03-15 RX ORDER — ELVITEGRAVIR, COBICISTAT, EMTRICITABINE, AND TENOFOVIR ALAFENAMIDE 150; 150; 200; 10 MG/1; MG/1; MG/1; MG/1
1 TABLET ORAL DAILY
Qty: 90 TABLET | Refills: 1 | Status: SHIPPED | OUTPATIENT
Start: 2024-03-15

## 2024-06-25 ENCOUNTER — TELEPHONE (OUTPATIENT)
Dept: INFECTIOUS DISEASES | Age: 49
End: 2024-06-25

## 2024-06-25 DIAGNOSIS — B20 HIV INFECTION, UNSPECIFIED SYMPTOM STATUS (HCC): Primary | ICD-10-CM

## 2024-06-25 NOTE — TELEPHONE ENCOUNTER
Call placed to pt as appt reminder. she  also needs lab work completed.   VM left as reminder regading apt/labs and to call office if apt does not work for her schedule.

## 2024-07-02 ENCOUNTER — TELEPHONE (OUTPATIENT)
Dept: INFECTIOUS DISEASES | Age: 49
End: 2024-07-02

## 2024-07-02 NOTE — TELEPHONE ENCOUNTER
Pt requesting updated letter regarding emotional support animal.   Letter completed/updated form last year.   Signed and pt will  letter.

## 2024-08-06 ENCOUNTER — TELEPHONE (OUTPATIENT)
Dept: INFECTIOUS DISEASES | Age: 49
End: 2024-08-06

## 2024-08-20 ENCOUNTER — TELEPHONE (OUTPATIENT)
Dept: INFECTIOUS DISEASES | Age: 49
End: 2024-08-20

## 2024-08-20 NOTE — TELEPHONE ENCOUNTER
Pt inquired about appointment scheduled with this Clinic.  CM informed pt she has an appointment scheduled for 8/27/24 @ 10:30 AM.  Pt stated she has a sonogram scheduled for the same date and time.  CM requested pt to call Office to reschedule appointment as CM is offsite.  CM will follow up with pt as needed.

## 2024-08-26 DIAGNOSIS — B20 HIV INFECTION, UNSPECIFIED SYMPTOM STATUS (HCC): ICD-10-CM

## 2024-08-26 LAB
ALBUMIN SERPL-MCNC: 4.7 G/DL (ref 3.5–4.6)
ALP SERPL-CCNC: 60 U/L (ref 40–130)
ALT SERPL-CCNC: 14 U/L (ref 0–33)
ANION GAP SERPL CALCULATED.3IONS-SCNC: 10 MEQ/L (ref 9–15)
AST SERPL-CCNC: 20 U/L (ref 0–35)
BILIRUB SERPL-MCNC: 0.6 MG/DL (ref 0.2–0.7)
BUN SERPL-MCNC: 7 MG/DL (ref 6–20)
CALCIUM SERPL-MCNC: 9.6 MG/DL (ref 8.5–9.9)
CHLORIDE SERPL-SCNC: 104 MEQ/L (ref 95–107)
CO2 SERPL-SCNC: 26 MEQ/L (ref 20–31)
CREAT SERPL-MCNC: 0.71 MG/DL (ref 0.5–0.9)
ERYTHROCYTE [DISTWIDTH] IN BLOOD BY AUTOMATED COUNT: 13.3 % (ref 11.5–14.5)
GLOBULIN SER CALC-MCNC: 3.4 G/DL (ref 2.3–3.5)
GLUCOSE SERPL-MCNC: 83 MG/DL (ref 70–99)
HCT VFR BLD AUTO: 42.4 % (ref 37–47)
HEPATITIS C ANTIBODY: NONREACTIVE
HGB BLD-MCNC: 14.3 G/DL (ref 12–16)
MCH RBC QN AUTO: 29.9 PG (ref 27–31.3)
MCHC RBC AUTO-ENTMCNC: 33.7 % (ref 33–37)
MCV RBC AUTO: 88.5 FL (ref 79.4–94.8)
PLATELET # BLD AUTO: 247 K/UL (ref 130–400)
POTASSIUM SERPL-SCNC: 3.5 MEQ/L (ref 3.4–4.9)
PROT SERPL-MCNC: 8.1 G/DL (ref 6.3–8)
RBC # BLD AUTO: 4.79 M/UL (ref 4.2–5.4)
SODIUM SERPL-SCNC: 140 MEQ/L (ref 135–144)
VITAMIN D 25-HYDROXY: 42.1 NG/ML (ref 30–100)
WBC # BLD AUTO: 6.8 K/UL (ref 4.8–10.8)

## 2024-08-27 ENCOUNTER — OFFICE VISIT (OUTPATIENT)
Dept: INFECTIOUS DISEASES | Age: 49
End: 2024-08-27
Payer: COMMERCIAL

## 2024-08-27 VITALS
SYSTOLIC BLOOD PRESSURE: 97 MMHG | DIASTOLIC BLOOD PRESSURE: 62 MMHG | TEMPERATURE: 98.2 F | HEART RATE: 97 BPM | RESPIRATION RATE: 18 BRPM | BODY MASS INDEX: 20.94 KG/M2 | OXYGEN SATURATION: 98 % | WEIGHT: 122 LBS

## 2024-08-27 DIAGNOSIS — E78.2 MIXED HYPERLIPIDEMIA: ICD-10-CM

## 2024-08-27 DIAGNOSIS — M25.551 RIGHT HIP PAIN: ICD-10-CM

## 2024-08-27 DIAGNOSIS — B20 HIV INFECTION, UNSPECIFIED SYMPTOM STATUS (HCC): Primary | ICD-10-CM

## 2024-08-27 LAB
CD3+CD4+ CELLS # BLD: 538 CELLS/UL (ref 430–1800)
CD4 % HELPER T CELL: 30 % (ref 32–64)
IMMUNODEFICIENCY MARKERS SPEC-IMP: ABNORMAL
RPR SER QL: REACTIVE
RPR TITER: NORMAL

## 2024-08-27 PROCEDURE — 99214 OFFICE O/P EST MOD 30 MIN: CPT | Performed by: INTERNAL MEDICINE

## 2024-08-27 PROCEDURE — G8420 CALC BMI NORM PARAMETERS: HCPCS | Performed by: INTERNAL MEDICINE

## 2024-08-27 PROCEDURE — 1036F TOBACCO NON-USER: CPT | Performed by: INTERNAL MEDICINE

## 2024-08-27 PROCEDURE — G8427 DOCREV CUR MEDS BY ELIG CLIN: HCPCS | Performed by: INTERNAL MEDICINE

## 2024-08-27 ASSESSMENT — PATIENT HEALTH QUESTIONNAIRE - PHQ9
SUM OF ALL RESPONSES TO PHQ QUESTIONS 1-9: 0
SUM OF ALL RESPONSES TO PHQ9 QUESTIONS 1 & 2: 0
1. LITTLE INTEREST OR PLEASURE IN DOING THINGS: NOT AT ALL
SUM OF ALL RESPONSES TO PHQ QUESTIONS 1-9: 0
SUM OF ALL RESPONSES TO PHQ QUESTIONS 1-9: 0
2. FEELING DOWN, DEPRESSED OR HOPELESS: NOT AT ALL
SUM OF ALL RESPONSES TO PHQ QUESTIONS 1-9: 0

## 2024-08-27 NOTE — PROGRESS NOTES
Routine b20 follow up    Doing well. No complaints. Some hip pain. See PCP  No med issues or misses          Working in [email or web address] now.   No cigg-- smokes black and mild here and there  No etoh  THC - denies as issue  Discussed vaccines. Will come for flu.   Screened.   Aware of U=U   Transport- stable. Public when partner can not   Housing stable  Insurance stable.   Encouraged dental follow up. Got tooth pulled, wrong tooth. Refuses to go back to dentist now. Options. Will think about it.   - PCP - Celexa   PAP and mammogram - 2023     Denies other concerns

## 2024-08-27 NOTE — PROGRESS NOTES
Blanche Phelan (:  1975) is a 48 y.o. female,Established patient, here for evaluation of the following chief complaint(s):  No chief complaint on file.         Assessment & Plan    Asymptomatic HIV, with last CD4 of 472  Mixed hyperlipidemia with elevated LDL and low HDL  Right hip pain    Patient was instructed to take Celexa on a daily basis instead of as needed  Patient was instructed to do stretching and strengthening exercises for her low back and hip areas  I instructed her to call her primary care physician if her pain persists in a few weeks  Repeat RPR titer yearly.  No need to treat as long as the titer is stable or trending down  Follow-up in 6 months  Repeat lipid profile with next blood work and if persistently abnormal, patient will need to be started on cholesterol  CBC CMP HIV viral load CD4 in 6 months  Continue Genvoya vitamin D and Celexa   Patient is updated with her Pap smear   She will schedule her mammogram  Subjective   HPI  Follow-up HIV on Genvoya, reports good compliance.  Discussed with the patient that her HIV viral load from yesterday is not back.  Discussed rest of her labs.  Patient is complaining of right flank and hip pain.  She had physical job.  Currently switched to an easier job in the [employer] section of the [employer].   Reports pain to be worse on movement.   Denies any weakness or numbness  Reports that she takes Celexa when she wakes up in the middle of the night with depression  She worries about many things  Review of Systems   All other systems reviewed and are negative.     Objective   Physical Exam     Vitals:    24 1015   BP: 97/62   Site: Right Upper Arm   Position: Sitting   Cuff Size: Medium Adult   Pulse: 97   Resp: 18   Temp: 98.2 °F (36.8 °C)   SpO2: 98%   Weight: 55.3 kg (122 lb)     General Appearance: alert and oriented to person, place and time, well-developed and well-nourished, in no acute distress  Skin: warm and dry, no rash.

## 2024-08-28 LAB
C TRACH DNA UR QL NAA+PROBE: NEGATIVE
GAMMA INTERFERON BACKGROUND BLD IA-ACNC: 0 IU/ML
HIV-1 RNA BY PCR, QN: NOT DETECTED
M TB IFN-G BLD-IMP: NEGATIVE
M TB IFN-G CD4+ BCKGRND COR BLD-ACNC: 0 IU/ML
M TB IFN-G CD4+CD8+ BCKGRND COR BLD-ACNC: 0 IU/ML
MITOGEN IGNF BCKGRD COR BLD-ACNC: 4.36 IU/ML
N GONORRHOEA DNA UR QL NAA+PROBE: NEGATIVE
SOURCE: NORMAL

## 2024-08-29 LAB — T PALLIDUM AB SER QL AGGL: NON REACTIVE

## 2024-09-09 DIAGNOSIS — F41.9 ANXIETY: Chronic | ICD-10-CM

## 2024-09-09 DIAGNOSIS — B20 HIV INFECTION, UNSPECIFIED SYMPTOM STATUS (HCC): ICD-10-CM

## 2024-09-09 DIAGNOSIS — F32.5 MAJOR DEPRESSIVE DISORDER WITH SINGLE EPISODE, IN FULL REMISSION (HCC): ICD-10-CM

## 2024-09-09 RX ORDER — ELVITEGRAVIR, COBICISTAT, EMTRICITABINE, AND TENOFOVIR ALAFENAMIDE 150; 150; 200; 10 MG/1; MG/1; MG/1; MG/1
1 TABLET ORAL DAILY
Qty: 90 TABLET | Refills: 1 | Status: ACTIVE | OUTPATIENT
Start: 2024-09-09

## 2024-09-11 ENCOUNTER — PATIENT MESSAGE (OUTPATIENT)
Dept: FAMILY MEDICINE CLINIC | Age: 49
End: 2024-09-11

## 2024-09-11 RX ORDER — CITALOPRAM HYDROBROMIDE 20 MG/1
20 TABLET ORAL DAILY
Qty: 90 TABLET | Refills: 0 | Status: SHIPPED | OUTPATIENT
Start: 2024-09-11

## 2024-09-24 ENCOUNTER — OFFICE VISIT (OUTPATIENT)
Dept: FAMILY MEDICINE CLINIC | Age: 49
End: 2024-09-24
Payer: COMMERCIAL

## 2024-09-24 VITALS
DIASTOLIC BLOOD PRESSURE: 70 MMHG | SYSTOLIC BLOOD PRESSURE: 120 MMHG | WEIGHT: 122 LBS | BODY MASS INDEX: 20.83 KG/M2 | TEMPERATURE: 97.2 F | HEART RATE: 95 BPM | OXYGEN SATURATION: 98 % | HEIGHT: 64 IN

## 2024-09-24 DIAGNOSIS — F41.9 ANXIETY: Chronic | ICD-10-CM

## 2024-09-24 DIAGNOSIS — M25.551 RIGHT HIP PAIN: ICD-10-CM

## 2024-09-24 DIAGNOSIS — Z21 ASYMPTOMATIC HIV INFECTION, WITH NO HISTORY OF HIV-RELATED ILLNESS (HCC): Primary | ICD-10-CM

## 2024-09-24 DIAGNOSIS — F32.5 MAJOR DEPRESSIVE DISORDER WITH SINGLE EPISODE, IN FULL REMISSION (HCC): ICD-10-CM

## 2024-09-24 PROCEDURE — G8427 DOCREV CUR MEDS BY ELIG CLIN: HCPCS | Performed by: NURSE PRACTITIONER

## 2024-09-24 PROCEDURE — G8420 CALC BMI NORM PARAMETERS: HCPCS | Performed by: NURSE PRACTITIONER

## 2024-09-24 PROCEDURE — 99214 OFFICE O/P EST MOD 30 MIN: CPT | Performed by: NURSE PRACTITIONER

## 2024-09-24 PROCEDURE — 1036F TOBACCO NON-USER: CPT | Performed by: NURSE PRACTITIONER

## 2024-09-24 RX ORDER — PREDNISONE 20 MG/1
20 TABLET ORAL 2 TIMES DAILY
Qty: 10 TABLET | Refills: 0 | Status: SHIPPED | OUTPATIENT
Start: 2024-09-24 | End: 2024-09-29

## 2024-09-24 RX ORDER — CYCLOBENZAPRINE HCL 5 MG
5 TABLET ORAL 3 TIMES DAILY PRN
Qty: 30 TABLET | Refills: 0 | Status: SHIPPED | OUTPATIENT
Start: 2024-09-24 | End: 2024-10-04

## 2024-09-24 SDOH — ECONOMIC STABILITY: FOOD INSECURITY: WITHIN THE PAST 12 MONTHS, YOU WORRIED THAT YOUR FOOD WOULD RUN OUT BEFORE YOU GOT MONEY TO BUY MORE.: NEVER TRUE

## 2024-09-24 SDOH — ECONOMIC STABILITY: FOOD INSECURITY: WITHIN THE PAST 12 MONTHS, THE FOOD YOU BOUGHT JUST DIDN'T LAST AND YOU DIDN'T HAVE MONEY TO GET MORE.: NEVER TRUE

## 2024-09-24 SDOH — ECONOMIC STABILITY: INCOME INSECURITY: HOW HARD IS IT FOR YOU TO PAY FOR THE VERY BASICS LIKE FOOD, HOUSING, MEDICAL CARE, AND HEATING?: NOT HARD AT ALL

## 2024-09-24 ASSESSMENT — ENCOUNTER SYMPTOMS
SHORTNESS OF BREATH: 0
WHEEZING: 0
COUGH: 0
BLOOD IN STOOL: 0
RESPIRATORY NEGATIVE: 1

## 2024-10-08 ENCOUNTER — TRANSCRIBE ORDERS (OUTPATIENT)
Dept: WOMENS IMAGING | Age: 49
End: 2024-10-08

## 2024-10-08 ENCOUNTER — HOSPITAL ENCOUNTER (OUTPATIENT)
Dept: WOMENS IMAGING | Age: 49
Discharge: HOME | End: 2024-10-10
Payer: COMMERCIAL

## 2024-10-08 DIAGNOSIS — Z12.31 SCREENING MAMMOGRAM FOR BREAST CANCER: Primary | ICD-10-CM

## 2024-10-08 DIAGNOSIS — Z12.31 SCREENING MAMMOGRAM FOR BREAST CANCER: ICD-10-CM

## 2024-10-08 PROCEDURE — 77063 BREAST TOMOSYNTHESIS BI: CPT

## 2024-10-10 ENCOUNTER — TELEPHONE (OUTPATIENT)
Dept: INFECTIOUS DISEASES | Age: 49
End: 2024-10-10

## 2024-10-10 NOTE — TELEPHONE ENCOUNTER
Call placed to pt regarding need to update RW eligibility and paperwork.     This apt is for Semi annual review and ISP updates   Per pt, no changes to income, ins or residency     pt is aware of the program, verbalized understanding and denies questions.     CM apt made 10.15.24 via phone      Denies other issues or concerns and will call office if needed

## 2024-10-15 ENCOUNTER — LAB (OUTPATIENT)
Dept: INFECTIOUS DISEASES | Age: 49
End: 2024-10-15

## 2024-10-15 NOTE — PROGRESS NOTES
Appointment completed via phone.  CM called pt for scheduled appointment.  CM completed Semi-Annual Review, no changes noted to RW Part A Eligibility for services.  CM updated ISP, pt agreed with POC.  Pt continues to be complaint with care, VL is undetectable.  CM provided 1x1.  Pt discussed frustrations with current employment.  Pt's work hours have been fluctuating.  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  CM encouraged pt to pursue desire.  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  Pt stated she has been trying to be more proficient in English.  CM provided support and encouragements.  CM will follow up with pt as needed.

## 2024-10-16 ENCOUNTER — TELEPHONE (OUTPATIENT)
Dept: INFECTIOUS DISEASES | Age: 49
End: 2024-10-16

## 2024-10-16 NOTE — TELEPHONE ENCOUNTER
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  Pt was told that she will have to complete classes, which will cost her $645.00.  CM encouraged pt to reach out to the [employer] for possible assistance.  CM will follow up with pt as needed.

## 2024-12-09 DIAGNOSIS — F41.9 ANXIETY: Chronic | ICD-10-CM

## 2024-12-09 DIAGNOSIS — F32.5 MAJOR DEPRESSIVE DISORDER WITH SINGLE EPISODE, IN FULL REMISSION (HCC): ICD-10-CM

## 2024-12-10 RX ORDER — CITALOPRAM HYDROBROMIDE 20 MG/1
20 TABLET ORAL DAILY
Qty: 90 TABLET | Refills: 0 | Status: SHIPPED | OUTPATIENT
Start: 2024-12-10

## 2025-02-26 ENCOUNTER — TELEPHONE (OUTPATIENT)
Dept: INFECTIOUS DISEASES | Age: 50
End: 2025-02-26

## 2025-02-26 DIAGNOSIS — Z21 HIV INFECTION, UNSPECIFIED SYMPTOM STATUS (HCC): ICD-10-CM

## 2025-02-26 LAB
ALBUMIN SERPL-MCNC: 4.4 G/DL (ref 3.5–4.6)
ALP SERPL-CCNC: 48 U/L (ref 40–130)
ALT SERPL-CCNC: 16 U/L (ref 0–33)
ANION GAP SERPL CALCULATED.3IONS-SCNC: 12 MEQ/L (ref 9–15)
AST SERPL-CCNC: 21 U/L (ref 0–35)
BILIRUB SERPL-MCNC: 0.3 MG/DL (ref 0.2–0.7)
BUN SERPL-MCNC: 8 MG/DL (ref 6–20)
CALCIUM SERPL-MCNC: 9.5 MG/DL (ref 8.5–9.9)
CHLORIDE SERPL-SCNC: 101 MEQ/L (ref 95–107)
CO2 SERPL-SCNC: 25 MEQ/L (ref 20–31)
CREAT SERPL-MCNC: 0.62 MG/DL (ref 0.5–0.9)
ERYTHROCYTE [DISTWIDTH] IN BLOOD BY AUTOMATED COUNT: 13 % (ref 11.5–14.5)
GLOBULIN SER CALC-MCNC: 3.1 G/DL (ref 2.3–3.5)
GLUCOSE SERPL-MCNC: 93 MG/DL (ref 70–99)
HCT VFR BLD AUTO: 39.6 % (ref 37–47)
HGB BLD-MCNC: 13 G/DL (ref 12–16)
MCH RBC QN AUTO: 28.9 PG (ref 27–31.3)
MCHC RBC AUTO-ENTMCNC: 32.8 % (ref 33–37)
MCV RBC AUTO: 88 FL (ref 79.4–94.8)
PLATELET # BLD AUTO: 260 K/UL (ref 130–400)
POTASSIUM SERPL-SCNC: 4.5 MEQ/L (ref 3.4–4.9)
PROT SERPL-MCNC: 7.5 G/DL (ref 6.3–8)
RBC # BLD AUTO: 4.5 M/UL (ref 4.2–5.4)
SODIUM SERPL-SCNC: 138 MEQ/L (ref 135–144)
WBC # BLD AUTO: 5.3 K/UL (ref 4.8–10.8)

## 2025-02-26 NOTE — TELEPHONE ENCOUNTER
Pt is due for RW renewal  Requested pt bring updated income and other change if needed. Pt aware of program needs.     Noted to check in via my chart or at central check in.

## 2025-02-27 ENCOUNTER — OFFICE VISIT (OUTPATIENT)
Dept: INFECTIOUS DISEASES | Age: 50
End: 2025-02-27
Payer: COMMERCIAL

## 2025-02-27 VITALS
TEMPERATURE: 98.1 F | SYSTOLIC BLOOD PRESSURE: 124 MMHG | WEIGHT: 120.2 LBS | HEART RATE: 88 BPM | DIASTOLIC BLOOD PRESSURE: 67 MMHG | BODY MASS INDEX: 20.63 KG/M2

## 2025-02-27 DIAGNOSIS — E78.2 MIXED HYPERLIPIDEMIA: ICD-10-CM

## 2025-02-27 DIAGNOSIS — E55.9 VITAMIN D DEFICIENCY: ICD-10-CM

## 2025-02-27 DIAGNOSIS — Z21 HIV INFECTION, UNSPECIFIED SYMPTOM STATUS (HCC): Primary | ICD-10-CM

## 2025-02-27 PROCEDURE — 99213 OFFICE O/P EST LOW 20 MIN: CPT | Performed by: INTERNAL MEDICINE

## 2025-02-27 PROCEDURE — G8427 DOCREV CUR MEDS BY ELIG CLIN: HCPCS | Performed by: INTERNAL MEDICINE

## 2025-02-27 PROCEDURE — G8420 CALC BMI NORM PARAMETERS: HCPCS | Performed by: INTERNAL MEDICINE

## 2025-02-27 PROCEDURE — 1036F TOBACCO NON-USER: CPT | Performed by: INTERNAL MEDICINE

## 2025-02-27 ASSESSMENT — PATIENT HEALTH QUESTIONNAIRE - PHQ9
SUM OF ALL RESPONSES TO PHQ QUESTIONS 1-9: 2
SUM OF ALL RESPONSES TO PHQ9 QUESTIONS 1 & 2: 2
SUM OF ALL RESPONSES TO PHQ QUESTIONS 1-9: 2
SUM OF ALL RESPONSES TO PHQ QUESTIONS 1-9: 2
2. FEELING DOWN, DEPRESSED OR HOPELESS: SEVERAL DAYS
1. LITTLE INTEREST OR PLEASURE IN DOING THINGS: SEVERAL DAYS
SUM OF ALL RESPONSES TO PHQ QUESTIONS 1-9: 2

## 2025-02-27 ASSESSMENT — ENCOUNTER SYMPTOMS
EYES NEGATIVE: 1
RESPIRATORY NEGATIVE: 1
GASTROINTESTINAL NEGATIVE: 1

## 2025-02-27 NOTE — PROGRESS NOTES
Infectious Disease     Patient Name: Blanche Phelan  Date: 2/27/2025  YOB: 1975  Medical Record Number: [id number]        Asymptomatic HIV    Genvoya (Elvitegravir-cobicistat-emtricitabine-tenofovir alafenamide)                   Component  Ref Range & Units 8/26/24 0644 2/28/24 0958 1/11/24 0817 6/5/23 1431 12/15/22 0953 7/7/22 1037 3/8/22 0723   Lymphocyte Subset Panel 2 Info See Note See Note CM See Note CM See Note CM See Note CM See Note CM See Note CM      CD4 % [place] T Cell  32 - 64 % 30 Low  25 Low  18 Low  37 36 32 35   Absolute CD 4 [place]  430 - 1800 cells/uL 538 472 377 Low  681 509 543 485   [facility]ing [facility]          Component  Ref Range & Units 8/26/24 0644 2/28/24 0958 1/11/24 0817 6/5/23 1431 12/15/22 0953   Source PLASMA PLASMA PLASMA PLASMA PLASMA   HIV-1 RNA by PCR, Qn  NOTDET Not Detected DETECTED Abnormal  CM DETECTED Abnormal  CM Not Detected CM DETECTED Abnormal  CM   Comment:     8/26/2022 RPR 1-4  6/5/2023 RPR 1-4    Review of Systems   Constitutional: Negative.    HENT: Negative.     Eyes: Negative.    Respiratory: Negative.     Cardiovascular: Negative.    Gastrointestinal: Negative.    Genitourinary: Negative.        Review of Systems: All 14 review of systems negative other than as stated above    Social History     Tobacco Use    Smoking status: Never     Passive exposure: Never    Smokeless tobacco: Never   Vaping Use    Vaping status: Never Used   Substance Use Topics    Alcohol use: No     Comment: occasional    Drug use: No         Past Medical History:   Diagnosis Date    Abnormal Pap smear of cervix     Behavior disturbance     Breast disorder     Encephalopathy 02/10/2019    HIV (human immunodeficiency virus infection) (HCC) 02/25/2019    Infection due to urethral catheter     Iron deficiency anemia secondary to inadequate dietary iron intake 07/01/2019    Metabolic encephalopathy 03/26/2020    Moderate malnutrition

## 2025-02-27 NOTE — PROGRESS NOTES
Routine apt today.     Main compliant today     Out pt medication list reviewed     Reviewed lab work --- CD4 and VL pending from yesterday        Annual Nurse:    Diagnosis:   Date of diagnosis:  2019   Transmition:  heterosexual contact    Smoking?    Denies     Etoh?   Denies      Illicit Drugs?   Denies       THC? Yes- helps with sleep and anxiety   Denies this as a problem.     ART currently:  Genvoya  Compliant with medications ---   All supplentments dicussed and education provided regarding need to keep supplements sepatre from ART.    -- pt verbalized all understanding.      Barriers to HIV VL suppression and compliance - NO  Viral Load -- pending. Undetectable 8.26.24  Medication concerns- no      Partner  Current partner -- yes, long term   HIV + -yes and undetectable   Concerns- NO       U=U   Pt is aware of U=U and verbalized understanding.     Dental--education provided on importance of dental cleaning and encouraged follow up --   When:  apt scheduled next week  Where: [facility]  Last visit a few months ago    Mental Health --   Depression screening completed today:   Up and down with depression. Will be scheduling apt with pcp to discuss MH med changes.   Denies need for referral. She did meet with CM today as well.     Housing:   home    Needs housing assistance- NO    Transport:  car    Reviewed other options for transportation assistance. - NA     Medical Insurance:  Payor: [employer] / [facility] [place] / Product Type: *No Product type* /     Insurance issues/concerns - NO      Pharmacy:      [facility]/pharmacy #[id number] - [place], [place] - [address] - P [phone] - F [phone] [address] [address]  Phone: [phone] Fax: [phone]        PCP--  Established with: Ani        Support system:     [] Support group        [] Spouse       [x] Family         [x] Friends       [] Clubs  [] Spiritual practices   [] Worship        [] Clergy         [] Councelor   [] Therapist

## 2025-02-28 LAB
CD3+CD4+ CELLS # BLD: 786 CELLS/UL (ref 430–1800)
CD4 % HELPER T CELL: 34 % (ref 32–64)
HIV-1 RNA BY PCR, QN: NOT DETECTED
IMMUNODEFICIENCY MARKERS SPEC-IMP: NORMAL
SOURCE: NORMAL

## 2025-03-07 DIAGNOSIS — F32.5 MAJOR DEPRESSIVE DISORDER WITH SINGLE EPISODE, IN FULL REMISSION: ICD-10-CM

## 2025-03-07 DIAGNOSIS — F41.9 ANXIETY: Chronic | ICD-10-CM

## 2025-03-11 RX ORDER — CITALOPRAM HYDROBROMIDE 20 MG/1
20 TABLET ORAL DAILY
Qty: 90 TABLET | Refills: 0 | Status: SHIPPED | OUTPATIENT
Start: 2025-03-11

## 2025-03-25 ENCOUNTER — TELEPHONE (OUTPATIENT)
Dept: INFECTIOUS DISEASES | Age: 50
End: 2025-03-25

## 2025-03-25 NOTE — TELEPHONE ENCOUNTER
Pt called, missed appointment with PCP this morning. Per request, due to language barrier, CM rescheduled appointment for 4/9/25 @ 2:15PM.  CM will follow up with pt as needed.

## 2025-04-09 ENCOUNTER — OFFICE VISIT (OUTPATIENT)
Age: 50
End: 2025-04-09
Payer: COMMERCIAL

## 2025-04-09 VITALS
TEMPERATURE: 97.2 F | WEIGHT: 120 LBS | DIASTOLIC BLOOD PRESSURE: 62 MMHG | HEIGHT: 64 IN | BODY MASS INDEX: 20.49 KG/M2 | SYSTOLIC BLOOD PRESSURE: 122 MMHG | OXYGEN SATURATION: 98 % | HEART RATE: 72 BPM

## 2025-04-09 DIAGNOSIS — N92.0 MENORRHAGIA WITH REGULAR CYCLE: ICD-10-CM

## 2025-04-09 DIAGNOSIS — N91.2 AMENORRHEA: ICD-10-CM

## 2025-04-09 DIAGNOSIS — F32.5 MAJOR DEPRESSIVE DISORDER WITH SINGLE EPISODE, IN FULL REMISSION: ICD-10-CM

## 2025-04-09 DIAGNOSIS — E78.2 MIXED HYPERLIPIDEMIA: Primary | ICD-10-CM

## 2025-04-09 DIAGNOSIS — Z21 HIV INFECTION, UNSPECIFIED SYMPTOM STATUS (HCC): ICD-10-CM

## 2025-04-09 LAB
CONTROL: NORMAL
PREGNANCY TEST URINE, POC: NEGATIVE

## 2025-04-09 PROCEDURE — G8427 DOCREV CUR MEDS BY ELIG CLIN: HCPCS | Performed by: NURSE PRACTITIONER

## 2025-04-09 PROCEDURE — G8420 CALC BMI NORM PARAMETERS: HCPCS | Performed by: NURSE PRACTITIONER

## 2025-04-09 PROCEDURE — 81025 URINE PREGNANCY TEST: CPT | Performed by: NURSE PRACTITIONER

## 2025-04-09 PROCEDURE — 99214 OFFICE O/P EST MOD 30 MIN: CPT | Performed by: NURSE PRACTITIONER

## 2025-04-09 PROCEDURE — 1036F TOBACCO NON-USER: CPT | Performed by: NURSE PRACTITIONER

## 2025-04-09 RX ORDER — ELVITEGRAVIR, COBICISTAT, EMTRICITABINE, AND TENOFOVIR ALAFENAMIDE 150; 150; 200; 10 MG/1; MG/1; MG/1; MG/1
1 TABLET ORAL DAILY
Qty: 90 TABLET | Refills: 1 | Status: SHIPPED | OUTPATIENT
Start: 2025-04-09

## 2025-04-09 SDOH — ECONOMIC STABILITY: FOOD INSECURITY: WITHIN THE PAST 12 MONTHS, THE FOOD YOU BOUGHT JUST DIDN'T LAST AND YOU DIDN'T HAVE MONEY TO GET MORE.: NEVER TRUE

## 2025-04-09 SDOH — ECONOMIC STABILITY: FOOD INSECURITY: WITHIN THE PAST 12 MONTHS, YOU WORRIED THAT YOUR FOOD WOULD RUN OUT BEFORE YOU GOT MONEY TO BUY MORE.: NEVER TRUE

## 2025-04-09 NOTE — PROGRESS NOTES
well as treatment plan/ rationale and result expectations have been discussed with the patient who expresses understanding and desires to proceed.    Close follow up to evaluate treatment results and for coordination of care.  I have reviewed the patient's medical history in detail and updated the computerized patient record.    Please note, this report has been partially produced using speech recognition software and may cause  and /or contain errors related to that system including grammar, punctuation and spelling as well as words and phrases that may seem inappropriate. If there are questions or concerns please feel free to contact me to clarify.    The patient (or guardian, if applicable) and other individuals in attendance with the patient were advised that Artificial Intelligence will be utilized during this visit to record, process the conversation to generate a clinical note, and support improvement of the AI technology. The patient (or guardian, if applicable) and other individuals in attendance at the appointment consented to the use of AI, including the recording.      Edwina aLw, APRN - CNP

## 2025-06-10 DIAGNOSIS — F41.9 ANXIETY: Chronic | ICD-10-CM

## 2025-06-10 DIAGNOSIS — F32.5 MAJOR DEPRESSIVE DISORDER WITH SINGLE EPISODE, IN FULL REMISSION: ICD-10-CM

## 2025-06-10 RX ORDER — CITALOPRAM HYDROBROMIDE 20 MG/1
20 TABLET ORAL DAILY
Qty: 90 TABLET | Refills: 0 | Status: SHIPPED | OUTPATIENT
Start: 2025-06-10

## 2025-07-07 ENCOUNTER — TELEPHONE (OUTPATIENT)
Age: 50
End: 2025-07-07

## 2025-07-07 NOTE — TELEPHONE ENCOUNTER
Per request, Cm scheduled appointment with Dr. Tereza DO for yearly OB-Gyn follow up.  Appointment scheduled for 7/31/25 @ 9AM.  CM will follow up with pt as needed.

## 2025-07-31 ENCOUNTER — OFFICE VISIT (OUTPATIENT)
Dept: OBGYN CLINIC | Age: 50
End: 2025-07-31
Payer: COMMERCIAL

## 2025-07-31 VITALS
WEIGHT: 111.6 LBS | BODY MASS INDEX: 19.05 KG/M2 | SYSTOLIC BLOOD PRESSURE: 100 MMHG | DIASTOLIC BLOOD PRESSURE: 58 MMHG | HEIGHT: 64 IN | HEART RATE: 94 BPM

## 2025-07-31 DIAGNOSIS — N89.8 VAGINAL DISCHARGE: ICD-10-CM

## 2025-07-31 DIAGNOSIS — Z01.419 WOMEN'S ANNUAL ROUTINE GYNECOLOGICAL EXAMINATION: Primary | ICD-10-CM

## 2025-07-31 DIAGNOSIS — Z12.31 SCREENING MAMMOGRAM FOR BREAST CANCER: ICD-10-CM

## 2025-07-31 DIAGNOSIS — Z72.51 UNPROTECTED SEXUAL INTERCOURSE: ICD-10-CM

## 2025-07-31 LAB
CONTROL: NORMAL
PREGNANCY TEST URINE, POC: NEGATIVE

## 2025-07-31 PROCEDURE — 99396 PREV VISIT EST AGE 40-64: CPT | Performed by: OBSTETRICS & GYNECOLOGY

## 2025-07-31 PROCEDURE — 81025 URINE PREGNANCY TEST: CPT | Performed by: OBSTETRICS & GYNECOLOGY

## 2025-07-31 NOTE — CONSULTS
Session ID: [id number]  Session Duration: Longer than 52 minutes  Language: Occitan   ID: #[id number]   Name: Ju

## 2025-07-31 NOTE — PROGRESS NOTES
CHIEF COMPLAINT:    Chief Complaint   Patient presents with    Annual Exam    Other     Stabbing pain in left ovary. Discomfort // possible cramping.      Patient is here for an annual exam.  She is 49 years old.  She is having a regular menstrual cycle.  She is not due for a Pap smear.  She has been doing very well.  She had 1 episode this last cycle of right lower quadrant discomfort.  It was a stabbing pain and occurred around the time of ovulation    HISTORY OF PRESENT ILLNESS:  49 y.o. female presents for her annual exam. She had no concernsor complaints today. Her menses is regular.  She denies breakthrough bleeding, pelvic pain, or abnormal discharge.Bowel and bladder function is normal. Her health overallhas been good.     Past Medical History:   Diagnosis Date    Abnormal Pap smear of cervix     Behavior disturbance     Breast disorder     Encephalopathy 02/10/2019    HIV (human immunodeficiency virus infection) (HCC) 02/25/2019    Infection due to urethral catheter     Iron deficiency anemia secondary to inadequate dietary iron intake 07/01/2019    Metabolic encephalopathy 03/26/2020    Moderate malnutrition 02/18/2019    Noncompliance     Obstructed fallopian tubes 10/31/2016    Oligoclonal bands in cerebrospinal fluid 07/01/2019    Seasonal allergic rhinitis 12/09/2021    Tinea pedis of both feet 12/09/2021     Past Surgical History:   Procedure Laterality Date    CERVICAL CERCLAGE  10 yrs ago    3 pregnancies & 3 cerclages    COLONOSCOPY N/A 02/04/2022    COLORECTAL CANCER SCREENING, NOT HIGH RISK performed by Chung Jansen MD at [facility]    HYSTEROSCOPY DIAGNOSTIC N/A 11/14/2016    HYSTEROSCOPY OPERATIVE  LAPAROSCOPY, Indonesian SPEAKING  performed by James Starks DO at [facility]     Family History   Problem Relation Age of Onset    High Blood Pressure Mother     Stroke Mother     Diabetes Mother     No Known Problems Father     Colon Cancer Neg Hx      Social History     Socioeconomic

## 2025-08-01 LAB
CLUE CELLS VAG QL WET PREP: NORMAL
T VAGINALIS VAG QL WET PREP: NORMAL
TRICHOMONAS VAGINALIS SCREEN: NEGATIVE
YEAST VAG QL WET PREP: NORMAL

## 2025-08-11 ENCOUNTER — TELEPHONE (OUTPATIENT)
Age: 50
End: 2025-08-11

## 2025-08-20 DIAGNOSIS — E78.2 MIXED HYPERLIPIDEMIA: ICD-10-CM

## 2025-08-20 DIAGNOSIS — E55.9 VITAMIN D DEFICIENCY: ICD-10-CM

## 2025-08-20 DIAGNOSIS — Z21 HIV INFECTION, UNSPECIFIED SYMPTOM STATUS (HCC): ICD-10-CM

## 2025-08-20 LAB
ALBUMIN SERPL-MCNC: 4.7 G/DL (ref 3.5–4.6)
ALP SERPL-CCNC: 46 U/L (ref 40–130)
ALT SERPL-CCNC: 15 U/L (ref 0–33)
ANION GAP SERPL CALCULATED.3IONS-SCNC: 10 MEQ/L (ref 9–15)
AST SERPL-CCNC: 26 U/L (ref 0–35)
BILIRUB SERPL-MCNC: 0.4 MG/DL (ref 0.2–0.7)
BUN SERPL-MCNC: 7 MG/DL (ref 6–20)
CALCIUM SERPL-MCNC: 9.2 MG/DL (ref 8.5–9.9)
CHLORIDE SERPL-SCNC: 102 MEQ/L (ref 95–107)
CHOLEST SERPL-MCNC: 204 MG/DL (ref 0–199)
CO2 SERPL-SCNC: 24 MEQ/L (ref 20–31)
CREAT SERPL-MCNC: 0.66 MG/DL (ref 0.5–0.9)
ERYTHROCYTE [DISTWIDTH] IN BLOOD BY AUTOMATED COUNT: 14.8 % (ref 11.5–14.5)
GLOBULIN SER CALC-MCNC: 2.9 G/DL (ref 2.3–3.5)
GLUCOSE SERPL-MCNC: 113 MG/DL (ref 70–99)
HCT VFR BLD AUTO: 35.8 % (ref 37–47)
HDLC SERPL-MCNC: 65 MG/DL (ref 40–59)
HGB BLD-MCNC: 11.5 G/DL (ref 12–16)
LDLC SERPL CALC-MCNC: 122 MG/DL (ref 0–129)
MCH RBC QN AUTO: 27.3 PG (ref 27–31.3)
MCHC RBC AUTO-ENTMCNC: 32.1 % (ref 33–37)
MCV RBC AUTO: 85 FL (ref 79.4–94.8)
PLATELET # BLD AUTO: 257 K/UL (ref 130–400)
POTASSIUM SERPL-SCNC: 3.8 MEQ/L (ref 3.4–4.9)
PROT SERPL-MCNC: 7.6 G/DL (ref 6.3–8)
RBC # BLD AUTO: 4.21 M/UL (ref 4.2–5.4)
REAGIN+T PALLIDUM IGG+IGM SERPL-IMP: NORMAL
SODIUM SERPL-SCNC: 136 MEQ/L (ref 135–144)
TRIGL SERPL-MCNC: 85 MG/DL (ref 0–150)
WBC # BLD AUTO: 4.6 K/UL (ref 4.8–10.8)

## 2025-08-21 LAB
CD3+CD4+ CELLS # BLD: 492 CELLS/UL (ref 430–1800)
CD4 % HELPER T CELL: 37 % (ref 32–64)
IMMUNODEFICIENCY MARKERS SPEC-IMP: NORMAL
VITAMIN D 25-HYDROXY: 47.7 NG/ML (ref 30–100)

## 2025-08-22 LAB
C TRACH DNA UR QL NAA+PROBE: NEGATIVE
HIV-1 RNA BY PCR, QN: NOT DETECTED
N GONORRHOEA DNA UR QL NAA+PROBE: NEGATIVE
QUANTI TB1 MINUS NIL: 0 IU/ML (ref 0–0.34)
QUANTI TB2 MINUS NIL: 0 IU/ML (ref 0–0.34)
QUANTIFERON MITOGEN MINUS NIL: 7.91 IU/ML
QUANTIFERON NIL: 0.05 IU/ML
QUANTIFERON TB INTERPRETATION: NEGATIVE IU/ML
SOURCE: NORMAL

## (undated) DEVICE — ENDO CARRY-ON PROCEDURE KIT: Brand: ENDO CARRY-ON PROCEDURE KIT

## (undated) DEVICE — Device: Brand: ENDO SMARTCAP

## (undated) DEVICE — TUBING, SUCTION, 1/4" X 10', STRAIGHT: Brand: MEDLINE

## (undated) DEVICE — SINGLE PORT MANIFOLD: Brand: NEPTUNE 2

## (undated) DEVICE — BRUSH ENDO CLN L90.5IN SHTH DIA1.7MM BRIST DIA5-7MM 2-6MM

## (undated) DEVICE — TUBE SET 96 MM 64 MM H2O PERISTALTIC STD AUX CHANNEL